# Patient Record
Sex: FEMALE | Race: WHITE | NOT HISPANIC OR LATINO | Employment: UNEMPLOYED | URBAN - METROPOLITAN AREA
[De-identification: names, ages, dates, MRNs, and addresses within clinical notes are randomized per-mention and may not be internally consistent; named-entity substitution may affect disease eponyms.]

---

## 2018-02-05 ENCOUNTER — CLINICAL SUPPORT (OUTPATIENT)
Dept: URGENT CARE | Facility: CLINIC | Age: 48
End: 2018-02-05
Payer: MEDICAID

## 2018-02-05 DIAGNOSIS — Z02.1 PRE-EMPLOYMENT DRUG SCREENING: Primary | ICD-10-CM

## 2018-02-05 LAB
CTP QC/QA: YES
POC 5 PANEL DRUG SCREEN: ABNORMAL

## 2018-02-05 PROCEDURE — 80305 DRUG TEST PRSMV DIR OPT OBS: CPT | Mod: QW,S$GLB,, | Performed by: PHYSICIAN ASSISTANT

## 2018-02-18 ENCOUNTER — HOSPITAL ENCOUNTER (EMERGENCY)
Facility: HOSPITAL | Age: 48
Discharge: HOME | End: 2018-02-18
Attending: EMERGENCY MEDICINE
Payer: MEDICAID

## 2018-02-18 VITALS
DIASTOLIC BLOOD PRESSURE: 68 MMHG | WEIGHT: 140 LBS | SYSTOLIC BLOOD PRESSURE: 101 MMHG | OXYGEN SATURATION: 99 % | TEMPERATURE: 98 F | RESPIRATION RATE: 18 BRPM | BODY MASS INDEX: 24.8 KG/M2 | HEART RATE: 103 BPM | HEIGHT: 63 IN

## 2018-02-18 DIAGNOSIS — L73.9 FOLLICULITIS: ICD-10-CM

## 2018-02-18 DIAGNOSIS — K04.7 DENTAL INFECTION: Primary | ICD-10-CM

## 2018-02-18 PROCEDURE — 99283 EMERGENCY DEPT VISIT LOW MDM: CPT

## 2018-02-18 PROCEDURE — 25000003 PHARM REV CODE 250: Performed by: PHYSICIAN ASSISTANT

## 2018-02-18 PROCEDURE — 99283 EMERGENCY DEPT VISIT LOW MDM: CPT | Mod: ,,, | Performed by: PHYSICIAN ASSISTANT

## 2018-02-18 RX ORDER — MUPIROCIN 20 MG/G
OINTMENT TOPICAL 3 TIMES DAILY
Qty: 30 G | Refills: 0 | Status: SHIPPED | OUTPATIENT
Start: 2018-02-18 | End: 2018-02-28

## 2018-02-18 RX ORDER — GLIMEPIRIDE 4 MG/1
4 TABLET ORAL
Qty: 30 TABLET | Refills: 0 | Status: ON HOLD | OUTPATIENT
Start: 2018-02-18 | End: 2018-09-03

## 2018-02-18 RX ORDER — MUPIROCIN 20 MG/G
1 OINTMENT TOPICAL
Status: COMPLETED | OUTPATIENT
Start: 2018-02-18 | End: 2018-02-18

## 2018-02-18 RX ORDER — AMOXICILLIN AND CLAVULANATE POTASSIUM 875; 125 MG/1; MG/1
1 TABLET, FILM COATED ORAL
Status: COMPLETED | OUTPATIENT
Start: 2018-02-18 | End: 2018-02-18

## 2018-02-18 RX ORDER — AMOXICILLIN AND CLAVULANATE POTASSIUM 875; 125 MG/1; MG/1
1 TABLET, FILM COATED ORAL 2 TIMES DAILY
Qty: 20 TABLET | Refills: 0 | Status: SHIPPED | OUTPATIENT
Start: 2018-02-18 | End: 2018-02-28

## 2018-02-18 RX ADMIN — AMOXICILLIN AND CLAVULANATE POTASSIUM 1 TABLET: 875; 125 TABLET, FILM COATED ORAL at 02:02

## 2018-02-18 RX ADMIN — MUPIROCIN 22 G: 20 OINTMENT TOPICAL at 02:02

## 2018-02-18 NOTE — ED NOTES
Patient identifiers for Mariah Foley 47 y.o. female checked and correct.  Chief Complaint   Patient presents with    Facial Swelling     left facial swelling and redness. dental work in progress     Past Medical History:   Diagnosis Date    Diabetes mellitus      Allergies reported: Review of patient's allergies indicates:  No Known Allergies      LOC: Patient is awake, alert, and aware of environment with an appropriate affect. Patient is oriented x 3 and speaking appropriately.  APPEARANCE: Patient resting comfortably and in no acute distress. Patient is clean and well groomed, patient's clothing is properly fastened.  HEENT: multiple lesions to chin area, redness, area firm, swelling noted, multiple broken teeth and cavities noted.  SKIN: The skin is warm and dry. Patient has normal skin turgor and moist mucus membranes. Skin is intact; no bruising or breakdown noted.  MUSKULOSKELETAL: Patient is moving all extremities well.  RESPIRATORY: Airway is open and patent. Respirations are spontaneous and non-labored with normal effort and rate.

## 2018-02-18 NOTE — ED TRIAGE NOTES
Lower facial swelling and redness noted, multiple healing lesions. Pt reports having sores develop after shaving on Wed.

## 2018-02-19 NOTE — ED PROVIDER NOTES
Encounter Date: 2018       History     Chief Complaint   Patient presents with    Facial Swelling     left facial swelling and redness. dental work in progress     Patient is a 47-year-old female with history of diabetes who presents to the emergency department with left-sided facial swelling.  She states earlier in the week, she attempted to shave her chin hairs.  She states 2 days later she had multiple scabs with pus to her chin.  She states she tried to pop them.  She states yesterday she woke up with significant swelling and pain in her mouth.  She states she is unsure if this is from the infected hair follicles on her chin or from her teeth.  She states she's been having dental work over the last several months.  She states she cannot afford to have all of her teeth pulled, but she was told that she needs to have her teeth pulled.  She denies fevers or chills.  She denies inability to control secretions.  She states she can still open and close her mouth.  She states that she recently moved here from [place].  She states she is not except care with a primary care doctor.  She states she has not had a prescription for her glimepiride in several weeks.      The history is provided by the patient.     Review of patient's allergies indicates:   Allergen Reactions    Bactrim [sulfamethoxazole-trimethoprim]      Past Medical History:   Diagnosis Date    Diabetes mellitus      Past Surgical History:   Procedure Laterality Date     SECTION, CLASSIC      TUBAL LIGATION       History reviewed. No pertinent family history.  Social History   Substance Use Topics    Smoking status: Current Every Day Smoker     Packs/day: 0.50     Years: 7.00     Types: Cigarettes    Smokeless tobacco: Not on file    Alcohol use No     Review of Systems   Constitutional: Negative for activity change, appetite change, chills, fatigue and fever.   HENT: Positive for dental problem and facial swelling. Negative for  congestion, ear discharge, ear pain, postnasal drip, rhinorrhea, sore throat and trouble swallowing.    Respiratory: Negative for cough and shortness of breath.    Cardiovascular: Negative for chest pain.   Gastrointestinal: Negative for abdominal pain, blood in stool, constipation, diarrhea, nausea and vomiting.   Genitourinary: Negative for dysuria, flank pain and hematuria.   Musculoskeletal: Negative for back pain, neck pain and neck stiffness.   Skin: Positive for rash and wound.   Neurological: Negative for dizziness, speech difficulty, weakness, light-headedness, numbness and headaches.       Physical Exam     Initial Vitals [02/18/18 1325]   BP Pulse Resp Temp SpO2   101/68 103 18 98 °F (36.7 °C) 99 %      MAP       79         Physical Exam    Nursing note and vitals reviewed.  Constitutional: She appears well-developed and well-nourished. She is not diaphoretic.  Non-toxic appearance. No distress.   HENT:   Head: Normocephalic.   Right Ear: Hearing and external ear normal.   Left Ear: Hearing and external ear normal.   Nose: Nose normal.   Mouth/Throat: Uvula is midline, oropharynx is clear and moist and mucous membranes are normal. No trismus in the jaw. Abnormal dentition (extensive dental decay with multiple cracked and broken teeth; all teeth are black; gingival erythema and edema most prominent at the mandibular central incisor.). Dental caries present. No uvula swelling. No oropharyngeal exudate.   Eyes: Conjunctivae are normal. Pupils are equal, round, and reactive to light.   Neck: Normal range of motion and full passive range of motion without pain. Neck supple. Normal range of motion present. No neck rigidity.   Cardiovascular: Normal rate and regular rhythm.   Pulmonary/Chest: Breath sounds normal.   Abdominal: Soft. Bowel sounds are normal. There is no tenderness.   Lymphadenopathy:     She has cervical adenopathy.   Neurological: She is alert and oriented to person, place, and time.   Skin:  Skin is warm and dry. Capillary refill takes less than 2 seconds.   Multiple scabs to chin with surrounding erythema   Psychiatric: She has a normal mood and affect.         ED Course   Procedures  Labs Reviewed - No data to display          Medical Decision Making:   Initial Assessment:   Urgent evaluation of a 47-year-old female with history of diabetes who presents to the emergency department with facial swelling.  Patient is afebrile, nontoxic appearing, and hemodynamically stable.  On exam, patient has folliculitis to chin.  She is extensive dental decay with obvious dental infection at the central incisor.  No drainable abscess.  I suspect that abscesses periapical.  No evidence of Ricardo angina.  Patient will be given Bactroban for the folliculitis.  She is given Augmentin for the dental infection.  She will be given a prescription for glimepiride.  She is advised to establish care with dentist and PCP.  She is advised to return to the emergency department with any worsening symptoms or concerns.  Case is discussed with supervising physician who agrees with plan.  Other:   I have discussed this case with another health care provider.       <> Summary of the Discussion: Dr. Valerio                      Clinical Impression:   The primary encounter diagnosis was Dental infection. Diagnoses of Folliculitis and Uncontrolled other specified diabetes mellitus without complication, unspecified long term insulin use status were also pertinent to this visit.                           Sariah Strong-MARYANN Purvis  02/18/18 5875

## 2018-03-16 ENCOUNTER — HOSPITAL ENCOUNTER (EMERGENCY)
Facility: HOSPITAL | Age: 48
Discharge: HOME | End: 2018-03-16
Attending: FAMILY MEDICINE
Payer: MEDICAID

## 2018-03-16 VITALS
WEIGHT: 130 LBS | TEMPERATURE: 98 F | SYSTOLIC BLOOD PRESSURE: 145 MMHG | HEIGHT: 63 IN | DIASTOLIC BLOOD PRESSURE: 77 MMHG | HEART RATE: 108 BPM | RESPIRATION RATE: 18 BRPM | OXYGEN SATURATION: 100 % | BODY MASS INDEX: 23.04 KG/M2

## 2018-03-16 DIAGNOSIS — J11.1 INFLUENZA: Primary | ICD-10-CM

## 2018-03-16 DIAGNOSIS — K04.7 DENTAL INFECTION: ICD-10-CM

## 2018-03-16 PROCEDURE — 63600175 PHARM REV CODE 636 W HCPCS: Performed by: FAMILY MEDICINE

## 2018-03-16 PROCEDURE — 99283 EMERGENCY DEPT VISIT LOW MDM: CPT | Mod: 25

## 2018-03-16 PROCEDURE — 99284 EMERGENCY DEPT VISIT MOD MDM: CPT | Mod: ,,, | Performed by: FAMILY MEDICINE

## 2018-03-16 PROCEDURE — 96372 THER/PROPH/DIAG INJ SC/IM: CPT

## 2018-03-16 RX ORDER — DEXAMETHASONE SODIUM PHOSPHATE 4 MG/ML
8 INJECTION, SOLUTION INTRA-ARTICULAR; INTRALESIONAL; INTRAMUSCULAR; INTRAVENOUS; SOFT TISSUE
Status: COMPLETED | OUTPATIENT
Start: 2018-03-16 | End: 2018-03-16

## 2018-03-16 RX ORDER — OSELTAMIVIR PHOSPHATE 75 MG/1
75 CAPSULE ORAL 2 TIMES DAILY
Qty: 10 CAPSULE | Refills: 0 | Status: SHIPPED | OUTPATIENT
Start: 2018-03-16 | End: 2018-03-21

## 2018-03-16 RX ORDER — PENICILLIN V POTASSIUM 500 MG/1
500 TABLET, FILM COATED ORAL 4 TIMES DAILY
Qty: 40 TABLET | Refills: 0 | Status: SHIPPED | OUTPATIENT
Start: 2018-03-16 | End: 2018-03-26

## 2018-03-16 RX ADMIN — DEXAMETHASONE SODIUM PHOSPHATE 8 MG: 4 INJECTION, SOLUTION INTRAMUSCULAR; INTRAVENOUS at 03:03

## 2018-03-16 NOTE — ED PROVIDER NOTES
Encounter Date: 3/16/2018    SCRIBE #1 NOTE: I, Heri Suarez, am scribing for, and in the presence of,  Dr. Liriano . I have scribed the following portions of the note - Other sections scribed: HPI, ROS,& PE.       History     Chief Complaint   Patient presents with    URI     hot cold, head pressure,      Time patient was seen by the provider: 3:29 PM    The patient is a 47 y.o. female with co-morbidities including: diabetes mellitus who presents to the ED with a complaint of fluid in her right ear. Pt also c/o chills, nausea, postnasal drip, and being around sick contacts. She denies a productive cough, vomiting, diarrhea, and fever. She also endorses dental pain. Of note: Pt did not get her flu shot this year.       The history is provided by the patient.     Review of patient's allergies indicates:   Allergen Reactions    Bactrim [sulfamethoxazole-trimethoprim]      Past Medical History:   Diagnosis Date    Diabetes mellitus      Past Surgical History:   Procedure Laterality Date     SECTION, CLASSIC      TUBAL LIGATION       No family history on file.  Social History   Substance Use Topics    Smoking status: Current Every Day Smoker     Packs/day: 0.50     Years: 7.00     Types: Cigarettes    Smokeless tobacco: Never Used    Alcohol use No     Review of Systems   Constitutional: Positive for chills. Negative for fever.   HENT: Positive for dental problem and postnasal drip. Negative for sore throat.         Right ear fluid.    Respiratory: Negative for cough and shortness of breath.    Cardiovascular: Negative for chest pain.   Gastrointestinal: Positive for nausea. Negative for diarrhea and vomiting.   Genitourinary: Negative for dysuria.   Musculoskeletal: Negative for back pain.   Skin: Negative for rash.   Neurological: Negative for weakness.   Hematological: Does not bruise/bleed easily.       Physical Exam     Initial Vitals [18 1426]   BP Pulse Resp Temp SpO2   (!) 145/77 108 18  97.9 °F (36.6 °C) 100 %      MAP       99.67         Physical Exam    Nursing note and vitals reviewed.  Constitutional: She appears well-developed and well-nourished. No distress.   HENT:   Head: Normocephalic and atraumatic.   Right TM bulging with clear fluid, but no infection. Throat-postnasal drip with no exudate. Pt's lower incisors are mildly erythematous. Below her teeth they are tender consistent with evolving dental infection.    Neck: Neck supple.   Swollen right anterior cervical nodes.    Musculoskeletal: Normal range of motion. She exhibits no edema.   Neurological: She is alert and oriented to person, place, and time.   Skin: Skin is warm and dry.         ED Course   Procedures  Labs Reviewed - No data to display          Medical Decision Making:   History:   Old Medical Records: I decided to obtain old medical records.  ED Management:  Patient's respiratory an infection symptoms are consistent with influenza.  We will treat with the flu ×5 days.  She also has poor dentition and symptoms consistent with a dental abscess.  We will also start Pen-Vee K 4 times a day ×10 days.  Patient is stable for discharge recommendations for her PCP follow-up, given            Scribe Attestation:   Scribe #1: I performed the above scribed service and the documentation accurately describes the services I performed. I attest to the accuracy of the note.               Clinical Impression:   The primary encounter diagnosis was Influenza. A diagnosis of Dental infection was also pertinent to this visit.                           Fausto Liriano MD  03/16/18 9560

## 2018-03-16 NOTE — ED NOTES
"Comes to the ED with c/o "my hair hurts"  And flu symptoms.  Also dental abscess.  LOC: The patient is awake, alert, and oriented to place, time, situation. Affect is appropriate.  Speech is appropriate and clear.     APPEARANCE: Patient resting comfortably in no acute distress.  Patient is clean and well groomed.    SKIN: The skin is warm and dry; color consistent with ethnicity.  Patient has normal skin turgor and moist mucus membranes.  Skin intact; no breakdown or bruising noted.     MUSCULOSKELETAL: Patient moving upper and lower extremities without difficulty.  Denies weakness.     RESPIRATORY: Airway is open and patent. Respirations spontaneous, even, easy, and non-labored.  Patient has a normal effort and rate.  No accessory muscle use noted. Denies cough. Sinus congestions and tinges in right ear.  CARDIAC:   No peripheral edema noted. No complaints of chest pain.      ABDOMEN: Soft and non tender to palpation.  No distention noted.     NEUROLOGIC: Eyes open spontaneously.  Behavior appropriate to situation.  Follows commands; facial expression symmetrical.  Purposeful motor response noted; normal sensation in all extremities.        "

## 2018-04-26 ENCOUNTER — HOSPITAL ENCOUNTER (EMERGENCY)
Facility: HOSPITAL | Age: 48
Discharge: HOME | End: 2018-04-26
Attending: FAMILY MEDICINE
Payer: MEDICAID

## 2018-04-26 VITALS
RESPIRATION RATE: 18 BRPM | OXYGEN SATURATION: 99 % | TEMPERATURE: 98 F | DIASTOLIC BLOOD PRESSURE: 63 MMHG | BODY MASS INDEX: 23.03 KG/M2 | WEIGHT: 130 LBS | SYSTOLIC BLOOD PRESSURE: 99 MMHG | HEART RATE: 102 BPM

## 2018-04-26 DIAGNOSIS — L02.91 ABSCESS: Primary | ICD-10-CM

## 2018-04-26 DIAGNOSIS — L03.90 CELLULITIS, UNSPECIFIED CELLULITIS SITE: ICD-10-CM

## 2018-04-26 LAB
B-HCG UR QL: NEGATIVE
BACTERIA #/AREA URNS AUTO: ABNORMAL /HPF
BILIRUB UR QL STRIP: NEGATIVE
CLARITY UR REFRACT.AUTO: ABNORMAL
COLOR UR AUTO: YELLOW
CTP QC/QA: YES
GLUCOSE UR QL STRIP: ABNORMAL
HGB UR QL STRIP: ABNORMAL
KETONES UR QL STRIP: NEGATIVE
LEUKOCYTE ESTERASE UR QL STRIP: ABNORMAL
MICROSCOPIC COMMENT: ABNORMAL
NITRITE UR QL STRIP: POSITIVE
PH UR STRIP: 5 [PH] (ref 5–8)
PROT UR QL STRIP: NEGATIVE
RBC #/AREA URNS AUTO: 6 /HPF (ref 0–4)
SP GR UR STRIP: 1.02 (ref 1–1.03)
SQUAMOUS #/AREA URNS AUTO: 27 /HPF
URN SPEC COLLECT METH UR: ABNORMAL
UROBILINOGEN UR STRIP-ACNC: NEGATIVE EU/DL
WBC #/AREA URNS AUTO: 44 /HPF (ref 0–5)
WBC CLUMPS UR QL AUTO: ABNORMAL
YEAST UR QL AUTO: ABNORMAL

## 2018-04-26 PROCEDURE — 87086 URINE CULTURE/COLONY COUNT: CPT

## 2018-04-26 PROCEDURE — 87077 CULTURE AEROBIC IDENTIFY: CPT

## 2018-04-26 PROCEDURE — 99284 EMERGENCY DEPT VISIT MOD MDM: CPT | Mod: 25

## 2018-04-26 PROCEDURE — 25000003 PHARM REV CODE 250: Performed by: FAMILY MEDICINE

## 2018-04-26 PROCEDURE — 81025 URINE PREGNANCY TEST: CPT | Performed by: FAMILY MEDICINE

## 2018-04-26 PROCEDURE — 87088 URINE BACTERIA CULTURE: CPT

## 2018-04-26 PROCEDURE — 10060 I&D ABSCESS SIMPLE/SINGLE: CPT | Mod: 51,,, | Performed by: NURSE PRACTITIONER

## 2018-04-26 PROCEDURE — 10061 I&D ABSCESS COMP/MULTIPLE: CPT

## 2018-04-26 PROCEDURE — 56405 I&D VULVA/PERINEAL ABSCESS: CPT

## 2018-04-26 PROCEDURE — 81001 URINALYSIS AUTO W/SCOPE: CPT

## 2018-04-26 PROCEDURE — 46050 I&D PERIANAL ABSCESS SUPFC: CPT | Mod: 59,,, | Performed by: NURSE PRACTITIONER

## 2018-04-26 PROCEDURE — 87186 SC STD MICRODIL/AGAR DIL: CPT

## 2018-04-26 PROCEDURE — 10060 I&D ABSCESS SIMPLE/SINGLE: CPT

## 2018-04-26 PROCEDURE — 99284 EMERGENCY DEPT VISIT MOD MDM: CPT | Mod: 25,,, | Performed by: NURSE PRACTITIONER

## 2018-04-26 RX ORDER — CEPHALEXIN 500 MG/1
500 CAPSULE ORAL 4 TIMES DAILY
Qty: 20 CAPSULE | Refills: 0 | Status: SHIPPED | OUTPATIENT
Start: 2018-04-26 | End: 2018-05-01

## 2018-04-26 RX ORDER — DOXYCYCLINE 100 MG/1
100 CAPSULE ORAL 2 TIMES DAILY
Qty: 20 CAPSULE | Refills: 0 | Status: SHIPPED | OUTPATIENT
Start: 2018-04-26 | End: 2018-05-06

## 2018-04-26 RX ORDER — OXYCODONE AND ACETAMINOPHEN 5; 325 MG/1; MG/1
2 TABLET ORAL
Status: COMPLETED | OUTPATIENT
Start: 2018-04-26 | End: 2018-04-26

## 2018-04-26 RX ORDER — LIDOCAINE HYDROCHLORIDE 10 MG/ML
20 INJECTION INFILTRATION; PERINEURAL ONCE
Status: COMPLETED | OUTPATIENT
Start: 2018-04-26 | End: 2018-04-26

## 2018-04-26 RX ORDER — OXYCODONE AND ACETAMINOPHEN 5; 325 MG/1; MG/1
1 TABLET ORAL EVERY 4 HOURS PRN
Qty: 18 TABLET | Refills: 0 | Status: SHIPPED | OUTPATIENT
Start: 2018-04-26 | End: 2018-09-02

## 2018-04-26 RX ADMIN — LIDOCAINE HYDROCHLORIDE 20 ML: 10 INJECTION, SOLUTION INFILTRATION; PERINEURAL at 01:04

## 2018-04-26 RX ADMIN — OXYCODONE HYDROCHLORIDE AND ACETAMINOPHEN 2 TABLET: 5; 325 TABLET ORAL at 02:04

## 2018-04-26 NOTE — ED PROVIDER NOTES
"Encounter Date: 2018       History     Chief Complaint   Patient presents with    Abscess     under left arm pit and to vagina.      Patient is a 47-year-old female with medical history of diabetes mellitus and chronic skin infections presenting to the ED for abscess to the left armpit and labia.  Patient states that she used a new disposable razor to shave with 4 days ago and the next day started with these abscesses. Pt states that "boil" in right armpit and in groin area is painful.  Pt states she is currently on Clindamycin for dental abscesses.  Patient denies fever, chills, nausea, vomiting, diarrhea, abdominal pain or shortness of breath.            Review of patient's allergies indicates:   Allergen Reactions    Bactrim [sulfamethoxazole-trimethoprim]      Past Medical History:   Diagnosis Date    Diabetes mellitus      Past Surgical History:   Procedure Laterality Date     SECTION, CLASSIC      TUBAL LIGATION       History reviewed. No pertinent family history.  Social History   Substance Use Topics    Smoking status: Current Every Day Smoker     Packs/day: 0.50     Years: 7.00     Types: Cigarettes    Smokeless tobacco: Never Used    Alcohol use No     Review of Systems   Constitutional: Negative for activity change, appetite change, chills, fatigue and fever.   HENT: Negative for congestion, sinus pain, sinus pressure, sore throat and trouble swallowing.    Eyes: Negative for photophobia, pain and discharge.   Respiratory: Negative for apnea, cough, choking, chest tightness, shortness of breath, wheezing and stridor.    Cardiovascular: Negative for chest pain, palpitations and leg swelling.   Gastrointestinal: Negative for abdominal distention, abdominal pain, constipation, diarrhea, nausea and vomiting.   Endocrine: Negative.    Genitourinary: Negative for difficulty urinating, dysuria, frequency and urgency.   Musculoskeletal: Negative for arthralgias, back pain, gait problem, joint " swelling, myalgias, neck pain and neck stiffness.   Skin: Positive for wound. Negative for pallor and rash.   Allergic/Immunologic: Negative.    Neurological: Negative for dizziness, tremors, syncope, weakness, numbness and headaches.   Hematological: Negative for adenopathy.   Psychiatric/Behavioral: Negative.        Physical Exam     Initial Vitals [04/26/18 1238]   BP Pulse Resp Temp SpO2   99/63 100 18 98.2 °F (36.8 °C) 100 %      MAP       75         Physical Exam    Nursing note and vitals reviewed.  Constitutional: Vital signs are normal. She appears well-developed and well-nourished. She is cooperative. She does not have a sickly appearance. She does not appear ill. No distress.   HENT:   Head: Normocephalic and atraumatic.   Mouth/Throat: Uvula is midline. Mucous membranes are pale. Abnormal dentition. Dental caries present. Oropharyngeal exudate present.   Eyes: Conjunctivae, EOM and lids are normal. Pupils are equal, round, and reactive to light.   Neck: Trachea normal, normal range of motion, full passive range of motion without pain and phonation normal. Neck supple. No JVD present.   Cardiovascular: Normal rate, regular rhythm, normal heart sounds and intact distal pulses.   Pulses:       Radial pulses are 2+ on the right side, and 2+ on the left side.        Dorsalis pedis pulses are 2+ on the right side, and 2+ on the left side.   Pulmonary/Chest: Effort normal and breath sounds normal.   Abdominal: Soft. Normal appearance and bowel sounds are normal. She exhibits no distension. There is no tenderness. There is no rigidity, no rebound and no guarding.   Genitourinary: Rectum normal and vagina normal.       Pelvic exam was performed with patient supine. There is tenderness on the right labia. There is no rash, lesion or injury on the right labia.   Musculoskeletal: Normal range of motion.   Neurological: She is alert and oriented to person, place, and time. She has normal strength.   Skin: Skin is  warm, dry and intact. Capillary refill takes less than 2 seconds. Abscess noted. No abrasion and no rash noted. No cyanosis. Nails show no clubbing.   2 abscesses noted to right armpit.    Upper abscess approximatly 0.5cm.   Lower abscess approximatly 2.4cm   Pubic area abscess approximatly 2.5cm      Psychiatric: Her behavior is normal. Judgment and thought content normal. Her mood appears anxious. Cognition and memory are normal. She exhibits a depressed mood.         ED Course   I & D - Incision and Drainage  Date/Time: 4/26/2018 3:44 PM  Location procedure was performed: [facility] EMERGENCY DEPARTMENT  Performed by: NASRA POOLE  Authorized by: KRIS SOLIS   Pre-operative diagnosis: abscess  Post-operative diagnosis: abscess  Consent Done: Emergent Situation  Type: abscess  Body area: anogenital  Location details: vulva  Anesthesia: local infiltration    Anesthesia:  Local Anesthetic: lidocaine 1% without epinephrine  Anesthetic total: 5 mL  Patient sedated: no  Scalpel size: 11  Incision type: single straight  Complexity: simple  Drainage: pus,  serosanguinous,  bloody and  purulent  Drainage amount: moderate  Wound treatment: incision,  wound left open,  drainage and  deloculation  Complications: No  Specimens: No  Implants: No  Patient tolerance: Patient tolerated the procedure well with no immediate complications    I & D - Incision and Drainage  Date/Time: 4/26/2018 3:46 PM  Location procedure was performed: [facility] EMERGENCY DEPARTMENT  Performed by: NASRA POOLE  Authorized by: KRIS SOLIS   Pre-operative diagnosis: abscess  Post-operative diagnosis: abscess  Consent Done: Emergent Situation  Type: abscess  Body area: upper extremity  Location details: left arm  Anesthesia: local infiltration    Anesthesia:  Local Anesthetic: lidocaine 1% without epinephrine  Patient sedated: no  Scalpel size: 11  Incision type: single straight  Complexity: simple  Drainage: pus,  serosanguinous,  bloody and   purulent  Drainage amount: scant  Wound treatment: incision,  wound left open,  drainage and  deloculation  Complications: No  Specimens: No  Implants: No  Patient tolerance: Patient tolerated the procedure well with no immediate complications    I & D - Incision and Drainage  Date/Time: 4/26/2018 3:47 PM  Location procedure was performed: [facility] EMERGENCY DEPARTMENT  Performed by: NASRA POOLE  Authorized by: KRIS SOLIS   Pre-operative diagnosis: abscess  Post-operative diagnosis: abscess  Consent Done: Emergent Situation  Type: abscess  Body area: anogenital (pelvic area)  Anesthesia: local infiltration    Anesthesia:  Local Anesthetic: lidocaine 1% without epinephrine  Patient sedated: no  Scalpel size: 11  Incision type: single straight  Complexity: simple  Drainage: pus,  serosanguinous,  bloody and  purulent  Drainage amount: scant  Wound treatment: incision,  drainage,  deloculation and  wound left open  Complications: No  Specimens: No  Implants: No  Patient tolerance: Patient tolerated the procedure well with no immediate complications        Labs Reviewed   URINALYSIS, REFLEX TO URINE CULTURE - Abnormal; Notable for the following:        Result Value    Appearance, UA Cloudy (*)     Glucose, UA 3+ (*)     Occult Blood UA 1+ (*)     Nitrite, UA Positive (*)     Leukocytes, UA 2+ (*)     All other components within normal limits    Narrative:     Preferred Collection Type->Urine, Clean Catch  YELLOW ONLY RECEIVED    URINALYSIS MICROSCOPIC - Abnormal; Notable for the following:     RBC, UA 6 (*)     WBC, UA 44 (*)     WBC Clumps, UA Few (*)     Bacteria, UA Many (*)     All other components within normal limits    Narrative:     Preferred Collection Type->Urine, Clean Catch  YELLOW ONLY RECEIVED    CULTURE, URINE   COMPREHENSIVE METABOLIC PANEL   CBC W/ AUTO DIFFERENTIAL   POCT URINE PREGNANCY                   APC / Resident Notes:   Emergent evaluation of a 46 yo female patient presenting to the ER  with chief complaint of scattered abscesses.  Pt states that 2 abscesses under her left armpit, 1 abscess on her right pelvic area, and 1 abscess on her labia.  Pt states that she used a disposable razor that she has never used before 2 days before the abscesses started.  On exam, pt multiple abscesses noted.  Breath sounds clear. Bowel sounds WNL.  I will medicate, I&D and reassess.  Differential diagnoses include but are not limited to abscess, sebaceous cyst, dermoid cyst, furuncle or others. I discussed the care of this patient with my Supervising Physician.      It is my impression based on the historical events and the physical exam that this is an abscess.  It was drained in the ER.  There is surrounding cellulitis.  I do not believe the patient has underlying necrotizing fasciitis.  Patient has risk factors for MRSA. Based on these factors we will treat with antibiotics. Pt is stable for discharge. Discharge instructions given. Prescription for Percocet, Keflex and Doxycycline given and explained. Return to ED precautions discussed. Follow up as directed. Pt verbalized understanding of this plan.                    Clinical Impression:   The primary encounter diagnosis was Abscess. A diagnosis of Cellulitis, unspecified cellulitis site was also pertinent to this visit.    Disposition:   Disposition: Discharged  Condition: Stable                        Cheri Polk NP  04/26/18 0861

## 2018-04-26 NOTE — ED TRIAGE NOTES
Pt c/o having a boil on her vaginal area. States feels it is on her right labia. Denies any drianage but c/o increasing pain. Pt  Also states has a boil under left armpit. Denies any drainage but c/o pain, redness, and states boil has come to a head.  Pt c/o first digit on left foot becoming infected about 4-5 days ago. States toe nail fell off about 4 days ago. Pt states was on antibiotics for another type of infection.

## 2018-04-26 NOTE — ED NOTES
LOC: The patient is awake, alert, and oriented to place, time, situation. Affect is appropriate.  Speech is appropriate and clear.     APPEARANCE: Patient resting comfortably in no acute distress.  Patient is clean and well groomed.    SKIN: Pt c/o having a boil on her vaginal area. States feels it is on her right labia. Denies any drianage but c/o increasing pain. Pt  Also states has a boil under left armpit. Denies any drainage; Area painful, red, and boil has come to a head. Pt c/o first digit on left foot becoming infected about 4-5 days ago. States toe nail fell off about 4 days ago. Pt states was on antibiotics for another type of infection. Pt toe nail area moist and yellow. No drainage or bleeding at this time.     MUSCULOSKELETAL: Patient moving upper and lower extremities without difficulty.  Denies weakness.     RESPIRATORY: Airway is open and patent. Respirations spontaneous, even, easy, and non-labored.  Patient has a normal effort and rate.  No accessory muscle use noted. Denies cough.     CARDIAC:  Normal rhythm and rate noted.  No peripheral edema noted. No complaints of chest pain.      ABDOMEN: Soft and non tender to palpation.  No distention noted.     NEUROLOGIC: Eyes open spontaneously.  Behavior appropriate to situation.  Follows commands; facial expression symmetrical.  Purposeful motor response noted; normal sensation in all extremities.

## 2018-04-28 LAB — BACTERIA UR CULT: NORMAL

## 2018-09-02 ENCOUNTER — HOSPITAL ENCOUNTER (INPATIENT)
Facility: HOSPITAL | Age: 48
LOS: 11 days | Discharge: HOME HEALTH | DRG: 853 | End: 2018-09-13
Attending: EMERGENCY MEDICINE | Admitting: INTERNAL MEDICINE
Payer: MEDICAID

## 2018-09-02 DIAGNOSIS — D72.829 LEUKOCYTOSIS, UNSPECIFIED TYPE: ICD-10-CM

## 2018-09-02 DIAGNOSIS — E83.42 HYPOMAGNESEMIA: ICD-10-CM

## 2018-09-02 DIAGNOSIS — E11.621 DIABETIC ULCER OF TOE OF RIGHT FOOT ASSOCIATED WITH TYPE 2 DIABETES MELLITUS, WITH FAT LAYER EXPOSED: ICD-10-CM

## 2018-09-02 DIAGNOSIS — R05.9 COUGH: ICD-10-CM

## 2018-09-02 DIAGNOSIS — Z79.4 TYPE 2 DIABETES MELLITUS WITH DIABETIC POLYNEUROPATHY, WITH LONG-TERM CURRENT USE OF INSULIN: ICD-10-CM

## 2018-09-02 DIAGNOSIS — R78.81 BACTEREMIA: ICD-10-CM

## 2018-09-02 DIAGNOSIS — N12 PYELONEPHRITIS: Primary | ICD-10-CM

## 2018-09-02 DIAGNOSIS — A41.9 SEPSIS: ICD-10-CM

## 2018-09-02 DIAGNOSIS — E13.621 FOOT ULCER DUE TO SECONDARY DM: ICD-10-CM

## 2018-09-02 DIAGNOSIS — L97.509 FOOT ULCER DUE TO SECONDARY DM: ICD-10-CM

## 2018-09-02 DIAGNOSIS — R73.9 HYPERGLYCEMIA: ICD-10-CM

## 2018-09-02 DIAGNOSIS — E11.42 TYPE 2 DIABETES MELLITUS WITH DIABETIC POLYNEUROPATHY, WITH LONG-TERM CURRENT USE OF INSULIN: ICD-10-CM

## 2018-09-02 DIAGNOSIS — L97.512 DIABETIC ULCER OF TOE OF RIGHT FOOT ASSOCIATED WITH TYPE 2 DIABETES MELLITUS, WITH FAT LAYER EXPOSED: ICD-10-CM

## 2018-09-02 DIAGNOSIS — M86.471 CHRONIC OSTEOMYELITIS OF RIGHT FOOT WITH DRAINING SINUS: ICD-10-CM

## 2018-09-02 PROBLEM — L97.519 DIABETIC ULCER OF TOE OF RIGHT FOOT: Status: ACTIVE | Noted: 2018-09-02

## 2018-09-02 PROBLEM — N39.0 UTI (URINARY TRACT INFECTION): Status: ACTIVE | Noted: 2018-09-02

## 2018-09-02 PROBLEM — Z72.0 TOBACCO ABUSE: Status: ACTIVE | Noted: 2018-09-02

## 2018-09-02 PROBLEM — E11.9 DM2 (DIABETES MELLITUS, TYPE 2): Status: ACTIVE | Noted: 2018-09-02

## 2018-09-02 PROBLEM — M79.7 FIBROMYALGIA: Status: ACTIVE | Noted: 2018-09-02

## 2018-09-02 LAB
ALBUMIN SERPL BCP-MCNC: 3.4 G/DL
ALP SERPL-CCNC: 145 U/L
ALT SERPL W/O P-5'-P-CCNC: 14 U/L
ANION GAP SERPL CALC-SCNC: 14 MMOL/L
AST SERPL-CCNC: 14 U/L
BACTERIA #/AREA URNS AUTO: ABNORMAL /HPF
BASOPHILS # BLD AUTO: 0.05 K/UL
BASOPHILS NFR BLD: 0.3 %
BILIRUB SERPL-MCNC: 0.6 MG/DL
BILIRUB UR QL STRIP: NEGATIVE
BUN SERPL-MCNC: 16 MG/DL
CALCIUM SERPL-MCNC: 9.6 MG/DL
CHLORIDE SERPL-SCNC: 94 MMOL/L
CLARITY UR REFRACT.AUTO: ABNORMAL
CO2 SERPL-SCNC: 23 MMOL/L
COLOR UR AUTO: YELLOW
CREAT SERPL-MCNC: 0.9 MG/DL
DIFFERENTIAL METHOD: ABNORMAL
EOSINOPHIL # BLD AUTO: 0 K/UL
EOSINOPHIL NFR BLD: 0.1 %
ERYTHROCYTE [DISTWIDTH] IN BLOOD BY AUTOMATED COUNT: 12.2 %
EST. GFR  (AFRICAN AMERICAN): >60 ML/MIN/1.73 M^2
EST. GFR  (NON AFRICAN AMERICAN): >60 ML/MIN/1.73 M^2
GLUCOSE SERPL-MCNC: 383 MG/DL
GLUCOSE UR QL STRIP: ABNORMAL
GRAM STN SPEC: NORMAL
HCT VFR BLD AUTO: 40.6 %
HETEROPH AB SERPL QL IA: NEGATIVE
HGB BLD-MCNC: 13.8 G/DL
HGB UR QL STRIP: ABNORMAL
IMM GRANULOCYTES # BLD AUTO: 0.1 K/UL
IMM GRANULOCYTES NFR BLD AUTO: 0.6 %
KETONES UR QL STRIP: ABNORMAL
LACTATE SERPL-SCNC: 1.6 MMOL/L
LEUKOCYTE ESTERASE UR QL STRIP: ABNORMAL
LYMPHOCYTES # BLD AUTO: 0.5 K/UL
LYMPHOCYTES NFR BLD: 2.9 %
MAGNESIUM SERPL-MCNC: 1.5 MG/DL
MCH RBC QN AUTO: 27.3 PG
MCHC RBC AUTO-ENTMCNC: 34 G/DL
MCV RBC AUTO: 80 FL
MICROSCOPIC COMMENT: ABNORMAL
MONOCYTES # BLD AUTO: 1.2 K/UL
MONOCYTES NFR BLD: 6.3 %
NEUTROPHILS # BLD AUTO: 16.3 K/UL
NEUTROPHILS NFR BLD: 89.8 %
NITRITE UR QL STRIP: NEGATIVE
NRBC BLD-RTO: 0 /100 WBC
PH UR STRIP: 5 [PH] (ref 5–8)
PLATELET # BLD AUTO: 180 K/UL
PMV BLD AUTO: 11.8 FL
POCT GLUCOSE: 286 MG/DL (ref 70–110)
POTASSIUM SERPL-SCNC: 3.6 MMOL/L
PROCALCITONIN SERPL IA-MCNC: 11.01 NG/ML
PROT SERPL-MCNC: 7.8 G/DL
PROT UR QL STRIP: NEGATIVE
RBC # BLD AUTO: 5.06 M/UL
RBC #/AREA URNS AUTO: 2 /HPF (ref 0–4)
SODIUM SERPL-SCNC: 131 MMOL/L
SP GR UR STRIP: 1.03 (ref 1–1.03)
SQUAMOUS #/AREA URNS AUTO: 4 /HPF
URN SPEC COLLECT METH UR: ABNORMAL
UROBILINOGEN UR STRIP-ACNC: NEGATIVE EU/DL
WBC # BLD AUTO: 18.15 K/UL
WBC #/AREA URNS AUTO: 45 /HPF (ref 0–5)
YEAST UR QL AUTO: ABNORMAL

## 2018-09-02 PROCEDURE — 83605 ASSAY OF LACTIC ACID: CPT

## 2018-09-02 PROCEDURE — 87205 SMEAR GRAM STAIN: CPT

## 2018-09-02 PROCEDURE — 87040 BLOOD CULTURE FOR BACTERIA: CPT

## 2018-09-02 PROCEDURE — 36415 COLL VENOUS BLD VENIPUNCTURE: CPT

## 2018-09-02 PROCEDURE — 80053 COMPREHEN METABOLIC PANEL: CPT

## 2018-09-02 PROCEDURE — 81001 URINALYSIS AUTO W/SCOPE: CPT

## 2018-09-02 PROCEDURE — 96361 HYDRATE IV INFUSION ADD-ON: CPT

## 2018-09-02 PROCEDURE — 96374 THER/PROPH/DIAG INJ IV PUSH: CPT

## 2018-09-02 PROCEDURE — 87077 CULTURE AEROBIC IDENTIFY: CPT | Mod: 59

## 2018-09-02 PROCEDURE — 87076 CULTURE ANAEROBE IDENT EACH: CPT | Mod: 59

## 2018-09-02 PROCEDURE — 11000001 HC ACUTE MED/SURG PRIVATE ROOM

## 2018-09-02 PROCEDURE — 99284 EMERGENCY DEPT VISIT MOD MDM: CPT | Mod: 25

## 2018-09-02 PROCEDURE — 84145 PROCALCITONIN (PCT): CPT

## 2018-09-02 PROCEDURE — 63600175 PHARM REV CODE 636 W HCPCS: Performed by: EMERGENCY MEDICINE

## 2018-09-02 PROCEDURE — 86703 HIV-1/HIV-2 1 RESULT ANTBDY: CPT

## 2018-09-02 PROCEDURE — 87880 STREP A ASSAY W/OPTIC: CPT

## 2018-09-02 PROCEDURE — 83735 ASSAY OF MAGNESIUM: CPT

## 2018-09-02 PROCEDURE — 96375 TX/PRO/DX INJ NEW DRUG ADDON: CPT

## 2018-09-02 PROCEDURE — 99223 1ST HOSP IP/OBS HIGH 75: CPT | Mod: ,,, | Performed by: STUDENT IN AN ORGANIZED HEALTH CARE EDUCATION/TRAINING PROGRAM

## 2018-09-02 PROCEDURE — 87186 SC STD MICRODIL/AGAR DIL: CPT

## 2018-09-02 PROCEDURE — 87088 URINE BACTERIA CULTURE: CPT

## 2018-09-02 PROCEDURE — 87400 INFLUENZA A/B EACH AG IA: CPT

## 2018-09-02 PROCEDURE — 87070 CULTURE OTHR SPECIMN AEROBIC: CPT

## 2018-09-02 PROCEDURE — 87081 CULTURE SCREEN ONLY: CPT

## 2018-09-02 PROCEDURE — 25000003 PHARM REV CODE 250: Performed by: EMERGENCY MEDICINE

## 2018-09-02 PROCEDURE — 85025 COMPLETE CBC W/AUTO DIFF WBC: CPT

## 2018-09-02 PROCEDURE — 87086 URINE CULTURE/COLONY COUNT: CPT

## 2018-09-02 PROCEDURE — 87147 CULTURE TYPE IMMUNOLOGIC: CPT

## 2018-09-02 PROCEDURE — 87075 CULTR BACTERIA EXCEPT BLOOD: CPT

## 2018-09-02 PROCEDURE — 86308 HETEROPHILE ANTIBODY SCREEN: CPT

## 2018-09-02 PROCEDURE — 99284 EMERGENCY DEPT VISIT MOD MDM: CPT | Mod: ,,, | Performed by: EMERGENCY MEDICINE

## 2018-09-02 PROCEDURE — 63600175 PHARM REV CODE 636 W HCPCS: Performed by: STUDENT IN AN ORGANIZED HEALTH CARE EDUCATION/TRAINING PROGRAM

## 2018-09-02 PROCEDURE — 25000003 PHARM REV CODE 250: Performed by: STUDENT IN AN ORGANIZED HEALTH CARE EDUCATION/TRAINING PROGRAM

## 2018-09-02 RX ORDER — GLUCAGON 1 MG
1 KIT INJECTION
Status: DISCONTINUED | OUTPATIENT
Start: 2018-09-02 | End: 2018-09-13 | Stop reason: HOSPADM

## 2018-09-02 RX ORDER — CEFTRIAXONE 1 G/1
1 INJECTION, POWDER, FOR SOLUTION INTRAMUSCULAR; INTRAVENOUS
Status: COMPLETED | OUTPATIENT
Start: 2018-09-02 | End: 2018-09-02

## 2018-09-02 RX ORDER — CEFTRIAXONE 1 G/1
1 INJECTION, POWDER, FOR SOLUTION INTRAMUSCULAR; INTRAVENOUS
Status: DISCONTINUED | OUTPATIENT
Start: 2018-09-02 | End: 2018-09-02

## 2018-09-02 RX ORDER — LANOLIN ALCOHOL/MO/W.PET/CERES
400 CREAM (GRAM) TOPICAL ONCE
Status: DISCONTINUED | OUTPATIENT
Start: 2018-09-02 | End: 2018-09-02

## 2018-09-02 RX ORDER — ACETAMINOPHEN 325 MG/1
650 TABLET ORAL EVERY 4 HOURS PRN
Status: DISCONTINUED | OUTPATIENT
Start: 2018-09-02 | End: 2018-09-13 | Stop reason: HOSPADM

## 2018-09-02 RX ORDER — LANOLIN ALCOHOL/MO/W.PET/CERES
400 CREAM (GRAM) TOPICAL
Status: COMPLETED | OUTPATIENT
Start: 2018-09-02 | End: 2018-09-02

## 2018-09-02 RX ORDER — SODIUM CHLORIDE 0.9 % (FLUSH) 0.9 %
3 SYRINGE (ML) INJECTION
Status: DISCONTINUED | OUTPATIENT
Start: 2018-09-02 | End: 2018-09-13 | Stop reason: HOSPADM

## 2018-09-02 RX ORDER — ONDANSETRON 2 MG/ML
4 INJECTION INTRAMUSCULAR; INTRAVENOUS
Status: COMPLETED | OUTPATIENT
Start: 2018-09-02 | End: 2018-09-02

## 2018-09-02 RX ORDER — POTASSIUM CHLORIDE 20 MEQ/15ML
40 SOLUTION ORAL ONCE
Status: COMPLETED | OUTPATIENT
Start: 2018-09-02 | End: 2018-09-02

## 2018-09-02 RX ORDER — VANCOMYCIN HCL IN 5 % DEXTROSE 1G/250ML
15 PLASTIC BAG, INJECTION (ML) INTRAVENOUS
Status: DISCONTINUED | OUTPATIENT
Start: 2018-09-02 | End: 2018-09-04

## 2018-09-02 RX ORDER — KETOROLAC TROMETHAMINE 30 MG/ML
15 INJECTION, SOLUTION INTRAMUSCULAR; INTRAVENOUS EVERY 6 HOURS PRN
Status: DISPENSED | OUTPATIENT
Start: 2018-09-02 | End: 2018-09-05

## 2018-09-02 RX ORDER — KETOROLAC TROMETHAMINE 30 MG/ML
15 INJECTION, SOLUTION INTRAMUSCULAR; INTRAVENOUS
Status: COMPLETED | OUTPATIENT
Start: 2018-09-02 | End: 2018-09-02

## 2018-09-02 RX ORDER — IBUPROFEN 200 MG
24 TABLET ORAL
Status: DISCONTINUED | OUTPATIENT
Start: 2018-09-02 | End: 2018-09-13 | Stop reason: HOSPADM

## 2018-09-02 RX ORDER — GABAPENTIN 300 MG/1
300 CAPSULE ORAL 3 TIMES DAILY
Status: DISCONTINUED | OUTPATIENT
Start: 2018-09-03 | End: 2018-09-13 | Stop reason: HOSPADM

## 2018-09-02 RX ORDER — IBUPROFEN 200 MG
16 TABLET ORAL
Status: DISCONTINUED | OUTPATIENT
Start: 2018-09-02 | End: 2018-09-13 | Stop reason: HOSPADM

## 2018-09-02 RX ORDER — INSULIN ASPART 100 [IU]/ML
0-5 INJECTION, SOLUTION INTRAVENOUS; SUBCUTANEOUS
Status: DISCONTINUED | OUTPATIENT
Start: 2018-09-02 | End: 2018-09-03

## 2018-09-02 RX ORDER — ONDANSETRON 8 MG/1
8 TABLET, ORALLY DISINTEGRATING ORAL EVERY 8 HOURS PRN
Status: DISCONTINUED | OUTPATIENT
Start: 2018-09-02 | End: 2018-09-13 | Stop reason: HOSPADM

## 2018-09-02 RX ADMIN — INSULIN ASPART 1 UNITS: 100 INJECTION, SOLUTION INTRAVENOUS; SUBCUTANEOUS at 11:09

## 2018-09-02 RX ADMIN — KETOROLAC TROMETHAMINE 15 MG: 30 INJECTION, SOLUTION INTRAMUSCULAR at 06:09

## 2018-09-02 RX ADMIN — KETOROLAC TROMETHAMINE 15 MG: 30 INJECTION, SOLUTION INTRAMUSCULAR at 11:09

## 2018-09-02 RX ADMIN — INSULIN DETEMIR 8 UNITS: 100 INJECTION, SOLUTION SUBCUTANEOUS at 11:09

## 2018-09-02 RX ADMIN — POTASSIUM CHLORIDE 40 MEQ: 20 SOLUTION ORAL at 11:09

## 2018-09-02 RX ADMIN — CEFTRIAXONE SODIUM 1 G: 1 INJECTION, POWDER, FOR SOLUTION INTRAMUSCULAR; INTRAVENOUS at 07:09

## 2018-09-02 RX ADMIN — ONDANSETRON 8 MG: 8 TABLET, ORALLY DISINTEGRATING ORAL at 11:09

## 2018-09-02 RX ADMIN — SODIUM CHLORIDE 1000 ML: 0.9 INJECTION, SOLUTION INTRAVENOUS at 06:09

## 2018-09-02 RX ADMIN — ONDANSETRON 4 MG: 2 INJECTION, SOLUTION INTRAMUSCULAR; INTRAVENOUS at 06:09

## 2018-09-02 RX ADMIN — SODIUM CHLORIDE 1000 ML: 0.9 INJECTION, SOLUTION INTRAVENOUS at 07:09

## 2018-09-02 RX ADMIN — Medication 400 MG: at 07:09

## 2018-09-02 RX ADMIN — VANCOMYCIN HYDROCHLORIDE 1000 MG: 1 INJECTION, POWDER, LYOPHILIZED, FOR SOLUTION INTRAVENOUS at 10:09

## 2018-09-02 RX ADMIN — ACETAMINOPHEN 650 MG: 325 TABLET, FILM COATED ORAL at 10:09

## 2018-09-02 NOTE — ED PROVIDER NOTES
Encounter Date: 2018       History     Chief Complaint   Patient presents with    Nasal Congestion     A 39-year-old female with history of diabetes presents with 5 days of generalized weakness.  It is associated with subjective fever, sweats and chills.  She has generalized body aches.  She has had several days of nausea and vomiting.  She has had decreased oral intake during this time.  She reports that she has had a cough productive of thick mucus.  No sick contacts.  Symptoms have been constant and progressively worsening without improvement.        The history is provided by the patient.     Review of patient's allergies indicates:   Allergen Reactions    Bactrim [sulfamethoxazole-trimethoprim]      Past Medical History:   Diagnosis Date    Diabetes mellitus      Past Surgical History:   Procedure Laterality Date     SECTION, CLASSIC      TUBAL LIGATION       History reviewed. No pertinent family history.  Social History     Tobacco Use    Smoking status: Current Every Day Smoker     Packs/day: 0.50     Years: 7.00     Pack years: 3.50     Types: Cigarettes    Smokeless tobacco: Never Used   Substance Use Topics    Alcohol use: No    Drug use: No     Review of Systems   Constitutional: Positive for activity change, appetite change, chills, diaphoresis and fever.   HENT: Negative for sore throat.    Respiratory: Positive for cough. Negative for shortness of breath.    Cardiovascular: Negative for chest pain.   Gastrointestinal: Positive for nausea and vomiting.   Genitourinary: Negative for dysuria.   Musculoskeletal: Negative for back pain.   Skin: Negative for rash.   Neurological: Negative for weakness.   Hematological: Does not bruise/bleed easily.   All other systems reviewed and are negative.      Physical Exam     Initial Vitals [18 1708]   BP Pulse Resp Temp SpO2   107/64 (!) 127 16 99 °F (37.2 °C) 100 %      MAP       --         Physical Exam    Vitals  reviewed.  Constitutional: Vital signs are normal. She appears well-developed and well-nourished. She is diaphoretic. She appears ill.   HENT:   Head: Normocephalic and atraumatic.   Mouth/Throat: Oropharynx is clear and moist.   Eyes: Conjunctivae and EOM are normal. Pupils are equal, round, and reactive to light.   Neck: Trachea normal and normal range of motion. Neck supple.   Cardiovascular: Regular rhythm, normal heart sounds and normal pulses. Tachycardia present.    Pulmonary/Chest: Breath sounds normal. No respiratory distress.   Abdominal: Soft. Normal appearance and bowel sounds are normal. There is no tenderness.   Musculoskeletal: Normal range of motion.   Neurological: She is alert and oriented to person, place, and time.   Skin: Skin is warm.         ED Course   Procedures  Labs Reviewed   CBC W/ AUTO DIFFERENTIAL - Abnormal; Notable for the following components:       Result Value    WBC 18.15 (*)     MCV 80 (*)     Immature Granulocytes 0.6 (*)     Gran # (ANC) 16.3 (*)     Immature Grans (Abs) 0.10 (*)     Lymph # 0.5 (*)     Mono # 1.2 (*)     Gran% 89.8 (*)     Lymph% 2.9 (*)     All other components within normal limits   COMPREHENSIVE METABOLIC PANEL - Abnormal; Notable for the following components:    Sodium 131 (*)     Chloride 94 (*)     Glucose 383 (*)     Albumin 3.4 (*)     Alkaline Phosphatase 145 (*)     All other components within normal limits   MAGNESIUM - Abnormal; Notable for the following components:    Magnesium 1.5 (*)     All other components within normal limits   URINALYSIS, REFLEX TO URINE CULTURE - Abnormal; Notable for the following components:    Appearance, UA Hazy (*)     Glucose, UA 3+ (*)     Ketones, UA 1+ (*)     Occult Blood UA 1+ (*)     Leukocytes, UA Trace (*)     All other components within normal limits    Narrative:     Preferred Collection Type->Urine, Clean Catch   URINALYSIS MICROSCOPIC - Abnormal; Notable for the following components:    WBC, UA 45 (*)      All other components within normal limits    Narrative:     Preferred Collection Type->Urine, Clean Catch   PROCALCITONIN - Abnormal; Notable for the following components:    Procalcitonin 11.01 (*)     All other components within normal limits    Narrative:     adding on procalcitonin per celestine rodriguez md 19:17  09/02/2018    GRAM STAIN   GRAM STAIN    Narrative:     add on test urine gram stain per dr celestine rodriguez order #[id number]   09/02/2018  20:21    CULTURE, URINE   CULTURE, BLOOD   CULTURE, BLOOD   PROCALCITONIN   LACTIC ACID, PLASMA          Imaging Results          X-Ray Chest PA And Lateral (Final result)  Result time 09/02/18 19:56:07    Final result by Corky Abdi MD (09/02/18 19:56:07)                 Impression:      No acute radiographic abnormality to explain the patient's cough.    Electronically signed by resident: Malachi Sands  Date:    09/02/2018  Time:    19:50    Electronically signed by: Corky Abdi MD  Date:    09/02/2018  Time:    19:56             Narrative:    EXAMINATION:  XR CHEST PA AND LATERAL    CLINICAL HISTORY:  Cough    TECHNIQUE:  PA and lateral views of the chest were performed.    COMPARISON:  No relevant, available prior.    FINDINGS:  Cardiomediastinal silhouette is unremarkable.  Trachea is midline.  Lungs are equally well expanded.  No large consolidative opacities.  No large pleural effusion.  No pneumothorax.  Pulmonary vascular pattern is unremarkable.  Upper abdomen shows no significant abnormality.  Osseous structures are intact.                              X-Rays:   Independently Interpreted Readings:   Chest X-Ray: Normal heart size.  No infiltrates.  No acute abnormalities.     Medical Decision Making:   History:   Old Medical Records: I decided to obtain old medical records.  Initial Assessment:   Emergent evaluation of sick symptoms.  Initial concern to include viral syndrome, pneumonia, pyelonephritis, sepsis.  Patient is afebrile here, though she is  diaphoretic and may have just broke her fever prior to arrival.  We will give IV fluids, check labs.  We will get imaging to evaluate for pneumonia.  Independently Interpreted Test(s):   I have ordered and independently interpreted X-rays - see prior notes.  Clinical Tests:   Lab Tests: Ordered and Reviewed  Radiological Study: Ordered and Reviewed              Attending Attestation:             Attending ED Notes:   Labs were concerning for elevated white blood cell count and procalcitonin.  Lactic acid is normal.  Patient is hyperglycemic.  There are signs of infection on urinalysis.  Culture and Gram stain have been sent.  Antibiotics started with Rocephin.  Has received 30 per kg IV fluid resuscitation.  She is hemodynamically stable for admission to the floor.             Clinical Impression:   The primary encounter diagnosis was Pyelonephritis. Diagnoses of Cough, Leukocytosis, unspecified type, Hyperglycemia, and Hypomagnesemia were also pertinent to this visit.      Disposition:   Disposition: Admitted  Condition: Shira Umana MD  09/02/18 7001

## 2018-09-02 NOTE — ED TRIAGE NOTES
Presents to ER with complaint of generalized body aches and head congestion for 6 days.  Patient's name and date of birth checked and is correct.  LOC: The patient is awake, alert and aware of environment with an appropriate affect, the patient is oriented x 3 and speaking appropriately.  APPEARANCE: Patient resting comfortably and in no acute distress, patient is clean and well groomed, patient's clothing is properly fastened.  CARDIOVASCULAR:  Heart rate regular and even with no peripheral edema noted.  SKIN: The skin is warm and dry, patient has normal skin turgor and moist mucus membranes, skin intact, no breakdown or brusing noted. MUSKULOSKELETAL: Patient moving all extremities well, no obvious swelling or deformities noted.  RESPIRATORY: Airway is open and patent, respirations are spontaneous, patient has a normal effort and rate.

## 2018-09-02 NOTE — ED NOTES
Patient also has a deep congested cough that is productive but she is not able to state the color of the sputum.

## 2018-09-03 PROBLEM — L08.9 INFECTED SKIN ULCER WITH FAT LAYER EXPOSED: Status: ACTIVE | Noted: 2018-09-03

## 2018-09-03 PROBLEM — L97.509 FOOT ULCER DUE TO SECONDARY DM: Status: ACTIVE | Noted: 2018-09-03

## 2018-09-03 PROBLEM — L98.492 INFECTED SKIN ULCER WITH FAT LAYER EXPOSED: Status: ACTIVE | Noted: 2018-09-03

## 2018-09-03 PROBLEM — M86.471 CHRONIC OSTEOMYELITIS OF RIGHT FOOT WITH DRAINING SINUS: Status: ACTIVE | Noted: 2018-09-03

## 2018-09-03 PROBLEM — E13.621 FOOT ULCER DUE TO SECONDARY DM: Status: ACTIVE | Noted: 2018-09-03

## 2018-09-03 PROBLEM — R53.81 DEBILITY: Status: ACTIVE | Noted: 2018-09-03

## 2018-09-03 LAB
ALBUMIN SERPL BCP-MCNC: 2.7 G/DL
ALP SERPL-CCNC: 103 U/L
ALT SERPL W/O P-5'-P-CCNC: 15 U/L
ANION GAP SERPL CALC-SCNC: 7 MMOL/L
AST SERPL-CCNC: 17 U/L
B-OH-BUTYR BLD STRIP-SCNC: 0.4 MMOL/L
BASOPHILS # BLD AUTO: 0.02 K/UL
BASOPHILS NFR BLD: 0.2 %
BILIRUB SERPL-MCNC: 0.5 MG/DL
BUN SERPL-MCNC: 15 MG/DL
CALCIUM SERPL-MCNC: 8.5 MG/DL
CHLORIDE SERPL-SCNC: 98 MMOL/L
CO2 SERPL-SCNC: 25 MMOL/L
CREAT SERPL-MCNC: 0.9 MG/DL
CRP SERPL-MCNC: 178.9 MG/L
DEPRECATED S PYO AG THROAT QL EIA: NEGATIVE
DIFFERENTIAL METHOD: ABNORMAL
EOSINOPHIL # BLD AUTO: 0 K/UL
EOSINOPHIL NFR BLD: 0 %
ERYTHROCYTE [DISTWIDTH] IN BLOOD BY AUTOMATED COUNT: 12.5 %
ERYTHROCYTE [SEDIMENTATION RATE] IN BLOOD BY WESTERGREN METHOD: 65 MM/HR
EST. GFR  (AFRICAN AMERICAN): >60 ML/MIN/1.73 M^2
EST. GFR  (NON AFRICAN AMERICAN): >60 ML/MIN/1.73 M^2
ESTIMATED AVG GLUCOSE: 306 MG/DL
FLUAV AG SPEC QL IA: NEGATIVE
FLUBV AG SPEC QL IA: NEGATIVE
GLUCOSE SERPL-MCNC: 344 MG/DL
HBA1C MFR BLD HPLC: 12.3 %
HCT VFR BLD AUTO: 34.2 %
HGB BLD-MCNC: 11.5 G/DL
HIV 1+2 AB+HIV1 P24 AG SERPL QL IA: NEGATIVE
IMM GRANULOCYTES # BLD AUTO: 0.06 K/UL
IMM GRANULOCYTES NFR BLD AUTO: 0.5 %
LYMPHOCYTES # BLD AUTO: 0.6 K/UL
LYMPHOCYTES NFR BLD: 5 %
MAGNESIUM SERPL-MCNC: 2.5 MG/DL
MCH RBC QN AUTO: 27.2 PG
MCHC RBC AUTO-ENTMCNC: 33.6 G/DL
MCV RBC AUTO: 81 FL
MONOCYTES # BLD AUTO: 0.9 K/UL
MONOCYTES NFR BLD: 7.9 %
NEUTROPHILS # BLD AUTO: 9.4 K/UL
NEUTROPHILS NFR BLD: 86.4 %
NRBC BLD-RTO: 0 /100 WBC
PHOSPHATE SERPL-MCNC: 3.6 MG/DL
PLATELET # BLD AUTO: 143 K/UL
PMV BLD AUTO: 11.6 FL
POCT GLUCOSE: 270 MG/DL (ref 70–110)
POCT GLUCOSE: 293 MG/DL (ref 70–110)
POCT GLUCOSE: 321 MG/DL (ref 70–110)
POTASSIUM SERPL-SCNC: 4 MMOL/L
PROT SERPL-MCNC: 6.2 G/DL
RBC # BLD AUTO: 4.23 M/UL
SODIUM SERPL-SCNC: 130 MMOL/L
SPECIMEN SOURCE: NORMAL
WBC # BLD AUTO: 10.93 K/UL

## 2018-09-03 PROCEDURE — 80053 COMPREHEN METABOLIC PANEL: CPT

## 2018-09-03 PROCEDURE — 36415 COLL VENOUS BLD VENIPUNCTURE: CPT

## 2018-09-03 PROCEDURE — 85025 COMPLETE CBC W/AUTO DIFF WBC: CPT

## 2018-09-03 PROCEDURE — 83036 HEMOGLOBIN GLYCOSYLATED A1C: CPT

## 2018-09-03 PROCEDURE — 99233 SBSQ HOSP IP/OBS HIGH 50: CPT | Mod: ,,, | Performed by: STUDENT IN AN ORGANIZED HEALTH CARE EDUCATION/TRAINING PROGRAM

## 2018-09-03 PROCEDURE — 85652 RBC SED RATE AUTOMATED: CPT

## 2018-09-03 PROCEDURE — 63600175 PHARM REV CODE 636 W HCPCS: Performed by: STUDENT IN AN ORGANIZED HEALTH CARE EDUCATION/TRAINING PROGRAM

## 2018-09-03 PROCEDURE — 25000003 PHARM REV CODE 250: Performed by: STUDENT IN AN ORGANIZED HEALTH CARE EDUCATION/TRAINING PROGRAM

## 2018-09-03 PROCEDURE — 86140 C-REACTIVE PROTEIN: CPT

## 2018-09-03 PROCEDURE — 83735 ASSAY OF MAGNESIUM: CPT

## 2018-09-03 PROCEDURE — 99233 SBSQ HOSP IP/OBS HIGH 50: CPT | Mod: ,,, | Performed by: PODIATRIST

## 2018-09-03 PROCEDURE — A9585 GADOBUTROL INJECTION: HCPCS | Performed by: STUDENT IN AN ORGANIZED HEALTH CARE EDUCATION/TRAINING PROGRAM

## 2018-09-03 PROCEDURE — 11000001 HC ACUTE MED/SURG PRIVATE ROOM

## 2018-09-03 PROCEDURE — S4991 NICOTINE PATCH NONLEGEND: HCPCS | Performed by: STUDENT IN AN ORGANIZED HEALTH CARE EDUCATION/TRAINING PROGRAM

## 2018-09-03 PROCEDURE — 84100 ASSAY OF PHOSPHORUS: CPT

## 2018-09-03 PROCEDURE — 82010 KETONE BODYS QUAN: CPT

## 2018-09-03 PROCEDURE — 25500020 PHARM REV CODE 255: Performed by: STUDENT IN AN ORGANIZED HEALTH CARE EDUCATION/TRAINING PROGRAM

## 2018-09-03 RX ORDER — DULOXETIN HYDROCHLORIDE 30 MG/1
30 CAPSULE, DELAYED RELEASE ORAL DAILY
Status: DISCONTINUED | OUTPATIENT
Start: 2018-09-03 | End: 2018-09-08

## 2018-09-03 RX ORDER — GADOBUTROL 604.72 MG/ML
6 INJECTION INTRAVENOUS
Status: COMPLETED | OUTPATIENT
Start: 2018-09-03 | End: 2018-09-03

## 2018-09-03 RX ORDER — INSULIN ASPART 100 [IU]/ML
1-10 INJECTION, SOLUTION INTRAVENOUS; SUBCUTANEOUS
Status: DISCONTINUED | OUTPATIENT
Start: 2018-09-03 | End: 2018-09-04

## 2018-09-03 RX ORDER — GABAPENTIN 800 MG/1
800 TABLET ORAL 3 TIMES DAILY
Status: ON HOLD | COMMUNITY
End: 2018-09-12 | Stop reason: HOSPADM

## 2018-09-03 RX ORDER — ESCITALOPRAM OXALATE 10 MG/1
10 TABLET ORAL DAILY
COMMUNITY

## 2018-09-03 RX ORDER — CALCIUM CARBONATE 200(500)MG
1 TABLET,CHEWABLE ORAL DAILY PRN
COMMUNITY

## 2018-09-03 RX ORDER — ERGOCALCIFEROL 1.25 MG/1
50000 CAPSULE ORAL
COMMUNITY

## 2018-09-03 RX ORDER — HYDROCODONE BITARTRATE AND ACETAMINOPHEN 7.5; 325 MG/1; MG/1
1 TABLET ORAL EVERY 6 HOURS PRN
Status: DISCONTINUED | OUTPATIENT
Start: 2018-09-03 | End: 2018-09-13 | Stop reason: HOSPADM

## 2018-09-03 RX ORDER — ENOXAPARIN SODIUM 100 MG/ML
40 INJECTION SUBCUTANEOUS EVERY 24 HOURS
Status: DISCONTINUED | OUTPATIENT
Start: 2018-09-03 | End: 2018-09-13 | Stop reason: HOSPADM

## 2018-09-03 RX ORDER — IBUPROFEN 200 MG
1 TABLET ORAL DAILY
Status: DISCONTINUED | OUTPATIENT
Start: 2018-09-03 | End: 2018-09-13 | Stop reason: HOSPADM

## 2018-09-03 RX ORDER — MUPIROCIN 20 MG/G
OINTMENT TOPICAL
COMMUNITY

## 2018-09-03 RX ORDER — CYCLOBENZAPRINE HCL 10 MG
10 TABLET ORAL 3 TIMES DAILY
COMMUNITY

## 2018-09-03 RX ORDER — INSULIN GLARGINE 100 [IU]/ML
10 INJECTION, SOLUTION SUBCUTANEOUS NIGHTLY
Status: ON HOLD | COMMUNITY
End: 2018-09-12 | Stop reason: HOSPADM

## 2018-09-03 RX ORDER — VANCOMYCIN HCL 900 MCG/MG
1 POWDER (GRAM) MISCELLANEOUS DAILY PRN
COMMUNITY

## 2018-09-03 RX ADMIN — ONDANSETRON 8 MG: 8 TABLET, ORALLY DISINTEGRATING ORAL at 11:09

## 2018-09-03 RX ADMIN — INSULIN ASPART 8 UNITS: 100 INJECTION, SOLUTION INTRAVENOUS; SUBCUTANEOUS at 12:09

## 2018-09-03 RX ADMIN — DULOXETINE HYDROCHLORIDE 30 MG: 30 CAPSULE, DELAYED RELEASE ORAL at 11:09

## 2018-09-03 RX ADMIN — GABAPENTIN 300 MG: 300 CAPSULE ORAL at 09:09

## 2018-09-03 RX ADMIN — HYDROCODONE BITARTRATE AND ACETAMINOPHEN 1 TABLET: 7.5; 325 TABLET ORAL at 08:09

## 2018-09-03 RX ADMIN — MAGNESIUM SULFATE HEPTAHYDRATE 3 G: 500 INJECTION, SOLUTION INTRAMUSCULAR; INTRAVENOUS at 02:09

## 2018-09-03 RX ADMIN — GABAPENTIN 300 MG: 300 CAPSULE ORAL at 08:09

## 2018-09-03 RX ADMIN — INSULIN DETEMIR 10 UNITS: 100 INJECTION, SOLUTION SUBCUTANEOUS at 10:09

## 2018-09-03 RX ADMIN — PIPERACILLIN AND TAZOBACTAM 4.5 G: 4; .5 INJECTION, POWDER, LYOPHILIZED, FOR SOLUTION INTRAVENOUS; PARENTERAL at 05:09

## 2018-09-03 RX ADMIN — INSULIN ASPART 6 UNITS: 100 INJECTION, SOLUTION INTRAVENOUS; SUBCUTANEOUS at 10:09

## 2018-09-03 RX ADMIN — HYDROCODONE BITARTRATE AND ACETAMINOPHEN 1 TABLET: 7.5; 325 TABLET ORAL at 01:09

## 2018-09-03 RX ADMIN — HYDROCODONE BITARTRATE AND ACETAMINOPHEN 1 TABLET: 7.5; 325 TABLET ORAL at 05:09

## 2018-09-03 RX ADMIN — PIPERACILLIN AND TAZOBACTAM 4.5 G: 4; .5 INJECTION, POWDER, LYOPHILIZED, FOR SOLUTION INTRAVENOUS; PARENTERAL at 09:09

## 2018-09-03 RX ADMIN — GABAPENTIN 300 MG: 300 CAPSULE ORAL at 03:09

## 2018-09-03 RX ADMIN — INSULIN ASPART 6 UNITS: 100 INJECTION, SOLUTION INTRAVENOUS; SUBCUTANEOUS at 09:09

## 2018-09-03 RX ADMIN — ENOXAPARIN SODIUM 40 MG: 100 INJECTION SUBCUTANEOUS at 05:09

## 2018-09-03 RX ADMIN — PIPERACILLIN AND TAZOBACTAM 4.5 G: 4; .5 INJECTION, POWDER, LYOPHILIZED, FOR SOLUTION INTRAVENOUS; PARENTERAL at 01:09

## 2018-09-03 RX ADMIN — KETOROLAC TROMETHAMINE 15 MG: 30 INJECTION, SOLUTION INTRAMUSCULAR at 12:09

## 2018-09-03 RX ADMIN — INSULIN ASPART 2 UNITS: 100 INJECTION, SOLUTION INTRAVENOUS; SUBCUTANEOUS at 05:09

## 2018-09-03 RX ADMIN — INSULIN DETEMIR 10 UNITS: 100 INJECTION, SOLUTION SUBCUTANEOUS at 09:09

## 2018-09-03 RX ADMIN — GADOBUTROL 6 ML: 604.72 INJECTION INTRAVENOUS at 09:09

## 2018-09-03 NOTE — NURSING
Report received ,care assumed, patient arrived via stretcher AAO x4. Pt lying supine in bed, Pt denies pain or any other concerns at this time. See assessment. Patient oriented to room. Bed in lowest position, side rails up x 2, bed wheela locked and call light within reach, NADK, will continue to monitor.

## 2018-09-03 NOTE — H&P
[facility] Medicine  History & Physical    Patient Name: Mariah Floey  MRN: [id number]  Admission Date: 2018  Attending Physician: Joe Helton MD   Primary Care Provider: Lukas Stein MD    [facility] Medicine Team: [facility] MED 5 Jewel Nguyen MD     Patient information was obtained from patient, past medical records and ER records.     Subjective:     Principal Problem:sepsis    Chief Complaint:   Chief Complaint   Patient presents with    Nasal Congestion        HPI: 46 yo F  with pmhx of fibromyalgia and DM2, in her usual state of health until 5 days prior to admission when she developed a sore throat with productive cough, which resolved after 2 days. Patient then developed myalgias, mainly around her left scapula and left CVA which she describes as a muscle stretching pain, 7 of 10 intensity, constant, associated with subjective fever, chills, generalized body aches, headache, sharp right ear paip, nausea, vomiting (once daily for past 4 days in the morning, nonbloody). No alleviating or aggravating factors. Patient tried ibuprofen for the pain at home which did not help. No chest or abdomian pain, diarrhea, constipation, changes in urinary habits, dysuria, hematuria, vaginal discharge or odors. Not sexually active. Has a ulcer on plantar aspect of right great toe which she says has been there for 2 months and has not noticed any pain, warmth, or discharge from it, although on examination there was obvious purulent discharge once dressing was removed. 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 Denies any recent trauma, sick contacts, travel out of the country.     Past Medical History:   Diagnosis Date    Diabetes mellitus        Past Surgical History:   Procedure Laterality Date     SECTION, CLASSIC      TUBAL LIGATION         Review of patient's allergies indicates:   Allergen Reactions    Bactrim  [sulfamethoxazole-trimethoprim]        No current facility-administered medications on file prior to encounter.      Current Outpatient Medications on File Prior to Encounter   Medication Sig    gabapentin (GRALISE) 600 mg Tb24 Take 1,800 mg by mouth daily with dinner or evening meal. Take 3 tablets with evening meals     glimepiride (AMARYL) 4 MG tablet Take 1 tablet (4 mg total) by mouth before breakfast.    [DISCONTINUED] oxyCODONE-acetaminophen (PERCOCET) 5-325 mg per tablet Take 1 tablet by mouth every 4 (four) hours as needed for Pain.     Family History     None        Tobacco Use    Smoking status: Current Every Day Smoker     Packs/day: 0.50     Years: 7.00     Pack years: 3.50     Types: Cigarettes    Smokeless tobacco: Never Used   Substance and Sexual Activity    Alcohol use: No    Drug use: No    Sexual activity: Not Currently     Review of Systems   Constitutional: Positive for appetite change, chills, diaphoresis, fatigue and fever.   HENT: Positive for congestion, ear pain, rhinorrhea and sore throat. Negative for mouth sores, nosebleeds, postnasal drip, sneezing, tinnitus and trouble swallowing.    Eyes: Negative for photophobia, pain, discharge and visual disturbance.   Respiratory: Positive for cough. Negative for choking, chest tightness, shortness of breath, wheezing and stridor.    Cardiovascular: Negative for chest pain, palpitations and leg swelling.   Gastrointestinal: Positive for nausea and vomiting. Negative for abdominal distention, abdominal pain, anal bleeding, blood in stool, constipation and diarrhea.   Endocrine: Negative for polydipsia, polyphagia and polyuria.   Genitourinary: Negative for difficulty urinating, flank pain, hematuria, pelvic pain, vaginal bleeding, vaginal discharge and vaginal pain.   Musculoskeletal: Positive for back pain and myalgias. Negative for arthralgias, joint swelling, neck pain and neck stiffness.   Skin: Positive for wound. Negative for rash.    Allergic/Immunologic: Negative for immunocompromised state.   Neurological: Positive for headaches. Negative for dizziness, tremors, facial asymmetry, weakness, light-headedness and numbness.   Hematological: Negative for adenopathy. Does not bruise/bleed easily.     Objective:     Vital Signs (Most Recent):  Temp: (!) 101.5 °F (38.6 °C) (09/02/18 2134)  Pulse: (!) 140 (09/02/18 2134)  Resp: 20 (09/02/18 2134)  BP: (!) 157/64 (09/02/18 2134)  SpO2: 97 % (09/02/18 2134) Vital Signs (24h Range):  Temp:  [98 °F (36.7 °C)-101.6 °F (38.7 °C)] 101.5 °F (38.6 °C)  Pulse:  [127-140] 140  Resp:  [16-20] 20  SpO2:  [95 %-100 %] 97 %  BP: (107-157)/(64-92) 157/64     Weight: 61.2 kg (135 lb)  Body mass index is 23.91 kg/m².    Physical Exam   Constitutional: She is oriented to person, place, and time. She appears well-developed and well-nourished.   Moderate distress, laying on left side,has chills    HENT:   Head: Normocephalic and atraumatic.   Right Ear: External ear normal.   Left Ear: External ear normal.   Mouth/Throat: Oropharynx is clear and moist. No oropharyngeal exudate.   Eyes: Conjunctivae and EOM are normal. Pupils are equal, round, and reactive to light. Right eye exhibits no discharge. Left eye exhibits no discharge. No scleral icterus.   Neck: Normal range of motion. Neck supple. No thyromegaly present.   Cardiovascular: Normal rate, regular rhythm, normal heart sounds and intact distal pulses. Exam reveals no gallop and no friction rub.   No murmur heard.  Pulmonary/Chest: Effort normal and breath sounds normal. No stridor. No respiratory distress. She has no wheezes. She has no rales. She exhibits no tenderness.   Abdominal: Soft. Bowel sounds are normal. She exhibits no distension and no mass. There is no tenderness. There is no guarding.   Musculoskeletal: Normal range of motion. She exhibits tenderness. She exhibits no edema or deformity.   1 cm ulcer on plantar aspect of right great toe, dark purulent  discharge, mild edema, no erythema.   Generalized mild tenderness to palpation of lower back around lumbar region, L > R  No acute CVA tenderness   Lymphadenopathy:     She has no cervical adenopathy.   Neurological: She is alert and oriented to person, place, and time.   Skin: Skin is warm and dry. Capillary refill takes less than 2 seconds. No rash noted. No erythema.         CRANIAL NERVES     CN III, IV, VI   Pupils are equal, round, and reactive to light.  Extraocular motions are normal.        Significant Labs:   Recent Lab Results       09/02/18  1920 09/02/18  1811 09/02/18  1810      Immature Granulocytes   0.6     Immature Grans (Abs)   0.10  Comment:  Mild elevation in immature granulocytes is non specific and   can be seen in a variety of conditions including stress response,   acute inflammation, trauma and pregnancy. Correlation with other   laboratory and clinical findings is essential.       Procalcitonin   11.01  Comment:  Please re-baseline procalcitonin if a patient is transferred from  other facilities to [facility].  A concentration < 0.25 ng/mL represents a low risk bacterial   infection.  Procalcitonin may not be accurate among patients with localized   infection, recent trauma or major surgery, immunosuppressed state,   invasive fungal infection, renal dysfunction. Decisions regarding   initiation or continuation of antibiotic therapy should not be based   solely on procalcitonin levels.       Albumin   3.4     Alkaline Phosphatase   145     ALT   14     Anion Gap   14     Appearance, UA  Hazy      AST   14     Bacteria, UA  Rare      Baso #   0.05     Basophil%   0.3     Bilirubin (UA)  Negative      Total Bilirubin   0.6  Comment:  For infants and newborns, interpretation of results should be based  on gestational age, weight and in agreement with clinical  observations.  Premature Infant recommended reference ranges:  Up to 24 hours.............<8.0 mg/dL  Up to 48  hours............<12.0 mg/dL  3-5 days..................<15.0 mg/dL  6-29 days.................<15.0 mg/dL       BUN, Bld   16     Calcium   9.6     Chloride   94     CO2   23     Color, UA  Yellow      Creatinine   0.9     Differential Method   Automated     eGFR if    >60.0     eGFR if non    >60.0  Comment:  Calculation used to obtain the estimated glomerular filtration  rate (eGFR) is the CKD-EPI equation.        Eos #   0.0     Eosinophil%   0.1     Glucose   383     Glucose, UA  3+      Gram Stain Result  Few WBC's        Few Gram positive cocci        Few Gram negative rods      Gran # (ANC)   16.3     Gran%   89.8     Hematocrit   40.6     Hemoglobin   13.8     Ketones, UA  1+      Lactate, Osiel 1.6  Comment:  Falsely low lactic acid results can be found in samples   containing >=13.0 mg/dL total bilirubin and/or >=3.5 mg/dL   direct bilirubin.         Leukocytes, UA  Trace      Lymph #   0.5     Lymph%   2.9     Magnesium   1.5     MCH   27.3     MCHC   34.0     MCV   80     Microscopic Comment  SEE COMMENT  Comment:  Other formed elements not mentioned in the report are not   present in the microscopic examination.         Mono #   1.2     Mono%   6.3     MPV   11.8     Nitrite, UA  Negative      nRBC   0     Occult Blood UA  1+      pH, UA  5.0      Platelets   180     Potassium   3.6     Total Protein   7.8     Protein, UA  Negative  Comment:  Recommend a 24 hour urine protein or a urine   protein/creatinine ratio if globulin induced proteinuria is  clinically suspected.        RBC   5.06     RBC, UA  2      RDW   12.2     Sodium   131     Specific Gravity, UA  1.030      Specimen UA  Urine, Clean Catch      Squam Epithel, UA  4      Urobilinogen, UA  Negative      WBC, UA  45      WBC   18.15     Yeast, UA  None          All pertinent labs within the past 24 hours have been reviewed.    Significant Imaging: I have reviewed and interpreted all pertinent imaging  results/findings within the past 24 hours.    Assessment/Plan:     Cough    -CXR unremarkable  -sputum culture if productive cough continues   -will consider coverage for atypical if pt not clinically improving with vanc/zosyn           Fibromyalgia    -resume gabapentin            DM2 (diabetes mellitus, type 2)    -takes insulin at home, patient think it's 10 units at night. Also on glimepiride BID   -low correctional scale, detemir 8U qHS, accuchecks qAC qHS   -goal CBG < 180         Tobacco abuse    - for tobacco cessation           UTI (urinary tract infection)    - rocephin given in ED for possible UTI           Leukocytosis      -see plan for sepsis         Sepsis    - 3/4 SIRS  - suspect source is infected right great toe ulcer  - purulent discharge from ulcer swabbed and sent for culture  -podiatry consulted  -MRI w/contrast ordered to r/o osteomyelitis   -started on vanc and zosyn IV   -received rocephin in ED   -given hx of sore throat, cough, myalgia - rapid strep, influenza, HIV, and monospot testing sent.   -f/u blood cultures   - 2L NS bolus in ED (~30cc/kg)   - tylenol PRN for mild pain (1-3) and fever, toradol PRN for moderate (4-7)             Diabetic ulcer of toe of right foot    -podiatry consulted  -mri w/contrast ordered          VTE Risk Mitigation (From admission, onward)        Ordered     IP VTE LOW RISK PATIENT  Once      09/02/18 2155     Place sequential compression device  Until discontinued      09/02/18 2155     IP VTE LOW RISK PATIENT  Once      09/02/18 2155         Plan discussed with attending Dr. Esthela MD, further recommendations as per attending addendum. Please feel free to call with any questions or concerns.    Jewel Nguyen MD  Resident Physician, PGY1    Jewel Nguyen MD  Department of Hospital Medicine   [facility]

## 2018-09-03 NOTE — HPI
Mariah Foley is a 47 y.o. female who  has a past medical history of Diabetes mellitus.    Patient Admited for pyelonephritis.  Consulted to Podiatry for right great toe wound.  Patient reports having the wound for several months now. She has been on disability since the wound because it is painful. Patient has close follow up with Dr. Vincent Lazo DPM for the wound.

## 2018-09-03 NOTE — SUBJECTIVE & OBJECTIVE
Past Medical History:   Diagnosis Date    Diabetes mellitus        Past Surgical History:   Procedure Laterality Date     SECTION, CLASSIC      TUBAL LIGATION         Review of patient's allergies indicates:   Allergen Reactions    Bactrim [sulfamethoxazole-trimethoprim]        No current facility-administered medications on file prior to encounter.      Current Outpatient Medications on File Prior to Encounter   Medication Sig    gabapentin (GRALISE) 600 mg Tb24 Take 1,800 mg by mouth daily with dinner or evening meal. Take 3 tablets with evening meals     glimepiride (AMARYL) 4 MG tablet Take 1 tablet (4 mg total) by mouth before breakfast.    [DISCONTINUED] oxyCODONE-acetaminophen (PERCOCET) 5-325 mg per tablet Take 1 tablet by mouth every 4 (four) hours as needed for Pain.     Family History     None        Tobacco Use    Smoking status: Current Every Day Smoker     Packs/day: 0.50     Years: 7.00     Pack years: 3.50     Types: Cigarettes    Smokeless tobacco: Never Used   Substance and Sexual Activity    Alcohol use: No    Drug use: No    Sexual activity: Not Currently     Review of Systems   Constitutional: Positive for appetite change, chills, diaphoresis, fatigue and fever.   HENT: Positive for congestion, ear pain, rhinorrhea and sore throat. Negative for mouth sores, nosebleeds, postnasal drip, sneezing, tinnitus and trouble swallowing.    Eyes: Negative for photophobia, pain, discharge and visual disturbance.   Respiratory: Positive for cough. Negative for choking, chest tightness, shortness of breath, wheezing and stridor.    Cardiovascular: Negative for chest pain, palpitations and leg swelling.   Gastrointestinal: Positive for nausea and vomiting. Negative for abdominal distention, abdominal pain, anal bleeding, blood in stool, constipation and diarrhea.   Endocrine: Negative for polydipsia, polyphagia and polyuria.   Genitourinary: Negative for difficulty urinating, flank pain,  hematuria, pelvic pain, vaginal bleeding, vaginal discharge and vaginal pain.   Musculoskeletal: Positive for back pain and myalgias. Negative for arthralgias, joint swelling, neck pain and neck stiffness.   Skin: Positive for wound. Negative for rash.   Allergic/Immunologic: Negative for immunocompromised state.   Neurological: Positive for headaches. Negative for dizziness, tremors, facial asymmetry, weakness, light-headedness and numbness.   Hematological: Negative for adenopathy. Does not bruise/bleed easily.     Objective:     Vital Signs (Most Recent):  Temp: (!) 101.5 °F (38.6 °C) (09/02/18 2134)  Pulse: (!) 140 (09/02/18 2134)  Resp: 20 (09/02/18 2134)  BP: (!) 157/64 (09/02/18 2134)  SpO2: 97 % (09/02/18 2134) Vital Signs (24h Range):  Temp:  [98 °F (36.7 °C)-101.6 °F (38.7 °C)] 101.5 °F (38.6 °C)  Pulse:  [127-140] 140  Resp:  [16-20] 20  SpO2:  [95 %-100 %] 97 %  BP: (107-157)/(64-92) 157/64     Weight: 61.2 kg (135 lb)  Body mass index is 23.91 kg/m².    Physical Exam   Constitutional: She is oriented to person, place, and time. She appears well-developed and well-nourished.   Moderate distress, laying on left side,has chills    HENT:   Head: Normocephalic and atraumatic.   Right Ear: External ear normal.   Left Ear: External ear normal.   Mouth/Throat: Oropharynx is clear and moist. No oropharyngeal exudate.   Eyes: Conjunctivae and EOM are normal. Pupils are equal, round, and reactive to light. Right eye exhibits no discharge. Left eye exhibits no discharge. No scleral icterus.   Neck: Normal range of motion. Neck supple. No thyromegaly present.   Cardiovascular: Normal rate, regular rhythm, normal heart sounds and intact distal pulses. Exam reveals no gallop and no friction rub.   No murmur heard.  Pulmonary/Chest: Effort normal and breath sounds normal. No stridor. No respiratory distress. She has no wheezes. She has no rales. She exhibits no tenderness.   Abdominal: Soft. Bowel sounds are normal.  She exhibits no distension and no mass. There is no tenderness. There is no guarding.   Musculoskeletal: Normal range of motion. She exhibits tenderness. She exhibits no edema or deformity.   1 cm ulcer on plantar aspect of right great toe, dark purulent discharge, mild edema, no erythema.   Generalized mild tenderness to palpation of lower back around lumbar region, L > R  No acute CVA tenderness   Lymphadenopathy:     She has no cervical adenopathy.   Neurological: She is alert and oriented to person, place, and time.   Skin: Skin is warm and dry. Capillary refill takes less than 2 seconds. No rash noted. No erythema.         CRANIAL NERVES     CN III, IV, VI   Pupils are equal, round, and reactive to light.  Extraocular motions are normal.        Significant Labs:   Recent Lab Results       09/02/18  1920 09/02/18  1811 09/02/18  1810      Immature Granulocytes   0.6     Immature Grans (Abs)   0.10  Comment:  Mild elevation in immature granulocytes is non specific and   can be seen in a variety of conditions including stress response,   acute inflammation, trauma and pregnancy. Correlation with other   laboratory and clinical findings is essential.       Procalcitonin   11.01  Comment:  Please re-baseline procalcitonin if a patient is transferred from  other facilities to [facility].  A concentration < 0.25 ng/mL represents a low risk bacterial   infection.  Procalcitonin may not be accurate among patients with localized   infection, recent trauma or major surgery, immunosuppressed state,   invasive fungal infection, renal dysfunction. Decisions regarding   initiation or continuation of antibiotic therapy should not be based   solely on procalcitonin levels.       Albumin   3.4     Alkaline Phosphatase   145     ALT   14     Anion Gap   14     Appearance, UA  Hazy      AST   14     Bacteria, UA  Rare      Baso #   0.05     Basophil%   0.3     Bilirubin (UA)  Negative      Total Bilirubin   0.6  Comment:  For  infants and newborns, interpretation of results should be based  on gestational age, weight and in agreement with clinical  observations.  Premature Infant recommended reference ranges:  Up to 24 hours.............<8.0 mg/dL  Up to 48 hours............<12.0 mg/dL  3-5 days..................<15.0 mg/dL  6-29 days.................<15.0 mg/dL       BUN, Bld   16     Calcium   9.6     Chloride   94     CO2   23     Color, UA  Yellow      Creatinine   0.9     Differential Method   Automated     eGFR if    >60.0     eGFR if non    >60.0  Comment:  Calculation used to obtain the estimated glomerular filtration  rate (eGFR) is the CKD-EPI equation.        Eos #   0.0     Eosinophil%   0.1     Glucose   383     Glucose, UA  3+      Gram Stain Result  Few WBC's        Few Gram positive cocci        Few Gram negative rods      Gran # (ANC)   16.3     Gran%   89.8     Hematocrit   40.6     Hemoglobin   13.8     Ketones, UA  1+      Lactate, Osiel 1.6  Comment:  Falsely low lactic acid results can be found in samples   containing >=13.0 mg/dL total bilirubin and/or >=3.5 mg/dL   direct bilirubin.         Leukocytes, UA  Trace      Lymph #   0.5     Lymph%   2.9     Magnesium   1.5     MCH   27.3     MCHC   34.0     MCV   80     Microscopic Comment  SEE COMMENT  Comment:  Other formed elements not mentioned in the report are not   present in the microscopic examination.         Mono #   1.2     Mono%   6.3     MPV   11.8     Nitrite, UA  Negative      nRBC   0     Occult Blood UA  1+      pH, UA  5.0      Platelets   180     Potassium   3.6     Total Protein   7.8     Protein, UA  Negative  Comment:  Recommend a 24 hour urine protein or a urine   protein/creatinine ratio if globulin induced proteinuria is  clinically suspected.        RBC   5.06     RBC, UA  2      RDW   12.2     Sodium   131     Specific Gravity, UA  1.030      Specimen UA  Urine, Clean Catch      Squam Epithel, UA  4       Urobilinogen, UA  Negative      WBC, UA  45      WBC   18.15     Yeast, UA  None          All pertinent labs within the past 24 hours have been reviewed.    Significant Imaging: I have reviewed and interpreted all pertinent imaging results/findings within the past 24 hours.

## 2018-09-03 NOTE — PROGRESS NOTES
[facility] Hospital Medicine  Progress Note    Patient Name: Mariah Foley  MRN: [id number]  Patient Class: IP- Inpatient   Admission Date: 2018  Length of Stay: 1 days  Attending Physician: Joe Helton MD  Primary Care Provider: Lukas Stein MD    [facility] Medicine Team: [facility] [place], [place] HOSP MED 5 Chiquita Mak MD    Subjective:     Principal Problem:Chronic osteomyelitis of right foot with draining sinus    HPI:  46 yo F  with pmhx of fibromyalgia and DM2, in her usual state of health until 5 days prior to admission when she developed a sore throat with productive cough, which resolved after 2 days. Patient then developed myalgias, mainly around her left scapula and left CVA which she describes as a muscle stretching pain, 7 of 10 intensity, constant, associated with subjective fever, chills, generalized body aches, headache, sharp right ear paip, nausea, vomiting (once daily for past 4 days in the morning, nonbloody). No alleviating or aggravating factors. Patient tried ibuprofen for the pain at home which did not help. No chest or abdomian pain, diarrhea, constipation, changes in urinary habits, dysuria, hematuria, vaginal discharge or odors. Not sexually active. Has a ulcer on plantar aspect of right great toe which she says has been there for 2 months and has not noticed any pain, warmth, or discharge from it, although on examination there was obvious purulent discharge once dressing was removed. 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 Denies any recent trauma, sick contacts, travel out of the country.     Hospital Course:  9/3- Patient seen today and she is vitally stable except of low BP 97/54. Waiting for the MRI result of the R great toe. Continue on Vanc and Zosyn and f/u with Vanc trough tomorrow AM    Past Medical History:   Diagnosis Date    Diabetes mellitus        Past Surgical History:   Procedure Laterality Date     SECTION,  CLASSIC      TUBAL LIGATION         Review of patient's allergies indicates:   Allergen Reactions    Bactrim [sulfamethoxazole-trimethoprim]        No current facility-administered medications on file prior to encounter.      Current Outpatient Medications on File Prior to Encounter   Medication Sig    calcium carbonate (TUMS) 200 mg calcium (500 mg) chewable tablet Take 1 tablet by mouth daily as needed for Heartburn.    cyclobenzaprine (FLEXERIL) 10 MG tablet Take 10 mg by mouth 3 (three) times daily.    ergocalciferol (VITAMIN D2) 50,000 unit Cap Take 50,000 Units by mouth every 7 days.    gabapentin (NEURONTIN) 800 MG tablet Take 800 mg by mouth 3 (three) times daily.    insulin glargine (BASAGLAR KWIKPEN U-100 INSULIN) 100 unit/mL (3 mL) InPn pen Inject 10 Units into the skin every evening.    mupirocin (BACTROBAN) 2 % ointment Apply topically daily as needed    vancomycin HCl (VANCOMYCIN, BULK,) 900 mcg/mg (not less than) Powd 10 mLs by Misc.(Non-Drug; Combo Route) route daily as needed (right foot).    [DISCONTINUED] gabapentin (GRALISE) 600 mg Tb24 Take 1,800 mg by mouth daily with dinner or evening meal. Take 3 tablets with evening meals     [DISCONTINUED] glimepiride (AMARYL) 4 MG tablet Take 1 tablet (4 mg total) by mouth before breakfast.     Family History     None        Tobacco Use    Smoking status: Current Every Day Smoker     Packs/day: 0.50     Years: 7.00     Pack years: 3.50     Types: Cigarettes    Smokeless tobacco: Never Used   Substance and Sexual Activity    Alcohol use: No    Drug use: No    Sexual activity: Not Currently     Review of Systems   Constitutional: Positive for appetite change, chills, diaphoresis, fatigue and fever.   HENT: Positive for congestion, ear pain, rhinorrhea and sore throat. Negative for mouth sores, nosebleeds, postnasal drip, sneezing, tinnitus and trouble swallowing.    Eyes: Negative for photophobia, pain, discharge and visual disturbance.    Respiratory: Positive for cough. Negative for choking, chest tightness, shortness of breath, wheezing and stridor.    Cardiovascular: Negative for chest pain, palpitations and leg swelling.   Gastrointestinal: Positive for nausea and vomiting. Negative for abdominal distention, abdominal pain, anal bleeding, blood in stool, constipation and diarrhea.   Endocrine: Negative for polydipsia, polyphagia and polyuria.   Genitourinary: Negative for difficulty urinating, flank pain, hematuria, pelvic pain, vaginal bleeding, vaginal discharge and vaginal pain.   Musculoskeletal: Positive for back pain and myalgias. Negative for arthralgias, joint swelling, neck pain and neck stiffness.   Skin: Positive for wound. Negative for rash.   Allergic/Immunologic: Negative for immunocompromised state.   Neurological: Positive for headaches. Negative for dizziness, tremors, facial asymmetry, weakness, light-headedness and numbness.   Hematological: Negative for adenopathy. Does not bruise/bleed easily.     Objective:     Vital Signs (Most Recent):  Temp: 98.3 °F (36.8 °C) (09/03/18 1218)  Pulse: 94 (09/03/18 1218)  Resp: 18 (09/03/18 1218)  BP: (!) 97/54 (09/03/18 1218)  SpO2: 99 % (09/03/18 1218) Vital Signs (24h Range):  Temp:  [96.9 °F (36.1 °C)-101.6 °F (38.7 °C)] 98.3 °F (36.8 °C)  Pulse:  [] 94  Resp:  [16-20] 18  SpO2:  [95 %-100 %] 99 %  BP: ()/(51-92) 97/54     Weight: 62.6 kg (138 lb)  Body mass index is 24.45 kg/m².    Physical Exam   Constitutional: She is oriented to person, place, and time. She appears well-developed and well-nourished.   Moderate distress, laying on left side,has chills    HENT:   Head: Normocephalic and atraumatic.   Right Ear: External ear normal.   Left Ear: External ear normal.   Mouth/Throat: Oropharynx is clear and moist. No oropharyngeal exudate.   Eyes: Conjunctivae and EOM are normal. Pupils are equal, round, and reactive to light. Right eye exhibits no discharge. Left eye  exhibits no discharge. No scleral icterus.   Neck: Normal range of motion. Neck supple. No thyromegaly present.   Cardiovascular: Normal rate, regular rhythm, normal heart sounds and intact distal pulses. Exam reveals no gallop and no friction rub.   No murmur heard.  Pulmonary/Chest: Effort normal and breath sounds normal. No stridor. No respiratory distress. She has no wheezes. She has no rales. She exhibits no tenderness.   Abdominal: Soft. Bowel sounds are normal. She exhibits no distension and no mass. There is no tenderness. There is no guarding.   Musculoskeletal: Normal range of motion. She exhibits tenderness. She exhibits no edema or deformity.   1 cm ulcer on plantar aspect of right great toe, dark purulent discharge, mild edema, no erythema.   Generalized mild tenderness to palpation of lower back around lumbar region, L > R  No acute CVA tenderness   Lymphadenopathy:     She has no cervical adenopathy.   Neurological: She is alert and oriented to person, place, and time.   Skin: Skin is warm and dry. Capillary refill takes less than 2 seconds. No rash noted. No erythema.         CRANIAL NERVES     CN III, IV, VI   Pupils are equal, round, and reactive to light.  Extraocular motions are normal.        Significant Labs:   Recent Lab Results       09/03/18  1224 09/03/18  1013 09/03/18  1007 09/03/18  0503 09/02/18  2343      Immature Granulocytes    0.5      Immature Grans (Abs)    0.06  Comment:  Mild elevation in immature granulocytes is non specific and   can be seen in a variety of conditions including stress response,   acute inflammation, trauma and pregnancy. Correlation with other   laboratory and clinical findings is essential.        Procalcitonin          Albumin    2.7      Alkaline Phosphatase    103      ALT    15      Anion Gap    7      Appearance, UA          AST    17      Bacteria, UA          Baso #    0.02      Basophil%    0.2      Beta-Hydroxybutyrate  0.4        Bilirubin (UA)           Total Bilirubin    0.5  Comment:  For infants and newborns, interpretation of results should be based  on gestational age, weight and in agreement with clinical  observations.  Premature Infant recommended reference ranges:  Up to 24 hours.............<8.0 mg/dL  Up to 48 hours............<12.0 mg/dL  3-5 days..................<15.0 mg/dL  6-29 days.................<15.0 mg/dL        Blood Culture, Routine          BUN, Bld    15      Calcium    8.5      Chloride    98      CO2    25      Color, UA          Creatinine    0.9      CRP    178.9      Differential Method    Automated      eGFR if     >60.0      eGFR if non     >60.0  Comment:  Calculation used to obtain the estimated glomerular filtration  rate (eGFR) is the CKD-EPI equation.         Eos #    0.0      Eosinophil%    0.0      Estimated Avg Glucose    306      Flu A & B Source     Nasopharyngeal Swab     Glucose    344      Glucose, UA          Gram Stain Result          Gran # (ANC)    9.4      Gran%    86.4      Hematocrit    34.2      Hemoglobin    11.5      Hemoglobin A1C    12.3  Comment:  ADA Screening Guidelines:  5.7-6.4%  Consistent with prediabetes  >or=6.5%  Consistent with diabetes  High levels of fetal hemoglobin interfere with the HbA1C  assay. Heterozygous hemoglobin variants (HbS, HgC, etc)do  not significantly interfere with this assay.   However, presence of multiple variants may affect accuracy.        HIV 1/2 Ag/Ab          Influenza A Ag, EIA     Negative     Influenza B Ag, EIA     Negative     Ketones, UA          Lactate, Osiel          Leukocytes, UA          Lymph #    0.6      Lymph%    5.0      Magnesium    2.5      MCH    27.2      MCHC    33.6      MCV    81      Microscopic Comment          Mono #    0.9      Mono%    7.9      Monospot          MPV    11.6      Nitrite, UA          nRBC    0      Occult Blood UA          pH, UA          Phosphorus    3.6      Platelets    143      POCT  Glucose 321  293       Potassium    4.0      Total Protein    6.2      Protein, UA          Rapid Strep A Screen     Negative  Comment:  See Micro for reflexed Strep culture.     RBC    4.23      RBC, UA          RDW    12.5      Sed Rate    65  Comment:  Manual ESR performed at [facility] [place]:  Normal range:  Male   0-10 mm/Hr  Female 0-20 mm/Hr  [C]      Sodium    130      Specific [place], UA          Specimen UA          Squam Epithel, UA          Urobilinogen, UA          WBC, UA          WBC    10.93      Yeast, UA                      09/02/18  2339 09/02/18  2231 09/02/18  1921 09/02/18  1920 09/02/18  1811      Immature Granulocytes          Immature Grans (Abs)          Procalcitonin          Albumin          Alkaline Phosphatase          ALT          Anion Gap          Appearance, UA     Hazy     AST          Bacteria, UA     Rare     Baso #          Basophil%          Beta-Hydroxybutyrate          Bilirubin (UA)     Negative     Total Bilirubin          Blood Culture, Routine   No Growth to date[P]          No Growth to date[P]       BUN, Bld          Calcium          Chloride          CO2          Color, UA     Yellow     Creatinine          CRP          Differential Method          eGFR if           eGFR if non           Eos #          Eosinophil%          Estimated Avg Glucose          Flu A & B Source          Glucose          Glucose, UA     3+     Gram Stain Result     Few WBC's          Few Gram positive cocci          Few Gram negative rods     Gran # (ANC)          Gran%          Hematocrit          Hemoglobin          Hemoglobin A1C          HIV 1/2 Ag/Ab  Negative        Influenza A Ag, EIA          Influenza B Ag, EIA          Ketones, UA     1+     Lactate, Osiel    1.6  Comment:  Falsely low lactic acid results can be found in samples   containing >=13.0 mg/dL total bilirubin and/or >=3.5 mg/dL   direct bilirubin.        Leukocytes, UA     Trace     Lymph #           Lymph%          Magnesium          MCH          MCHC          MCV          Microscopic Comment     SEE COMMENT  Comment:  Other formed elements not mentioned in the report are not   present in the microscopic examination.        Mono #          Mono%          Monospot  Negative        MPV          Nitrite, UA     Negative     nRBC          Occult Blood UA     1+     pH, UA     5.0     Phosphorus          Platelets          POCT Glucose 286         Potassium          Total Protein          Protein, UA     Negative  Comment:  Recommend a 24 hour urine protein or a urine   protein/creatinine ratio if globulin induced proteinuria is  clinically suspected.       Rapid Strep A Screen          RBC          RBC, UA     2     RDW          Sed Rate          Sodium          Specific [place], UA     1.030     Specimen UA     Urine, Clean Catch     Squam Epithel, UA     4     Urobilinogen, UA     Negative     WBC, UA     45     WBC          Yeast, UA     None                 09/02/18  1810      Immature Granulocytes 0.6     Immature Grans (Abs) 0.10  Comment:  Mild elevation in immature granulocytes is non specific and   can be seen in a variety of conditions including stress response,   acute inflammation, trauma and pregnancy. Correlation with other   laboratory and clinical findings is essential.       Procalcitonin 11.01  Comment:  Please re-baseline procalcitonin if a patient is transferred from  other facilities to [facility].  A concentration < 0.25 ng/mL represents a low risk bacterial   infection.  Procalcitonin may not be accurate among patients with localized   infection, recent trauma or major surgery, immunosuppressed state,   invasive fungal infection, renal dysfunction. Decisions regarding   initiation or continuation of antibiotic therapy should not be based   solely on procalcitonin levels.       Albumin 3.4     Alkaline Phosphatase 145     ALT 14     Anion Gap 14     Appearance, UA      AST 14      Bacteria, UA      Baso # 0.05     Basophil% 0.3     Beta-Hydroxybutyrate      Bilirubin (UA)      Total Bilirubin 0.6  Comment:  For infants and newborns, interpretation of results should be based  on gestational age, weight and in agreement with clinical  observations.  Premature Infant recommended reference ranges:  Up to 24 hours.............<8.0 mg/dL  Up to 48 hours............<12.0 mg/dL  3-5 days..................<15.0 mg/dL  6-29 days.................<15.0 mg/dL       Blood Culture, Routine      BUN, Bld 16     Calcium 9.6     Chloride 94     CO2 23     Color, UA      Creatinine 0.9     CRP      Differential Method Automated     eGFR if African American >60.0     eGFR if non  >60.0  Comment:  Calculation used to obtain the estimated glomerular filtration  rate (eGFR) is the CKD-EPI equation.        Eos # 0.0     Eosinophil% 0.1     Estimated Avg Glucose      Flu A & B Source      Glucose 383     Glucose, UA      Gram Stain Result      Gran # (ANC) 16.3     Gran% 89.8     Hematocrit 40.6     Hemoglobin 13.8     Hemoglobin A1C      HIV 1/2 Ag/Ab      Influenza A Ag, EIA      Influenza B Ag, EIA      Ketones, UA      Lactate, Osiel      Leukocytes, UA      Lymph # 0.5     Lymph% 2.9     Magnesium 1.5     MCH 27.3     MCHC 34.0     MCV 80     Microscopic Comment      Mono # 1.2     Mono% 6.3     Monospot      MPV 11.8     Nitrite, UA      nRBC 0     Occult Blood UA      pH, UA      Phosphorus      Platelets 180     POCT Glucose      Potassium 3.6     Total Protein 7.8     Protein, UA      Rapid Strep A Screen      RBC 5.06     RBC, UA      RDW 12.2     Sed Rate      Sodium 131     Specific [place], UA      Specimen UA      Squam Epithel, UA      Urobilinogen, UA      WBC, UA      WBC 18.15     Yeast, UA          All pertinent labs within the past 24 hours have been reviewed.    Significant Imaging: I have reviewed and interpreted all pertinent imaging results/findings within the past 24  hours.    Assessment/Plan:      Foot ulcer due to secondary DM              Debility    -PT and OT was ordered to treat and evaluate.  -f/u their recs.        Cough    -CXR unremarkable  -sputum culture if productive cough continues   -will consider coverage for atypical if pt not clinically improving with vanc/zosyn           Fibromyalgia    -currently on Duloxetine 30 mg once daily.  -resume gabapentin            DM2 (diabetes mellitus, type 2)    -takes insulin at home, patient think it's 10 units at night. Also on glimepiride BID   -currently she is on detemir 10U BID and insulin sliding scale (aspart 8U, QID (AC+HS)  -continue f/u blood glucose.        Tobacco abuse    - for tobacco cessation           UTI (urinary tract infection)    - rocephin given in ED for possible UTI           Leukocytosis      -see plan for sepsis         Sepsis    - 3/4 SIRS  - suspect source is infected right great toe ulcer  - purulent discharge from ulcer swabbed and sent for culture  -podiatry consulted  -MRI w/contrast ordered to r/o osteomyelitis   -started on vanc and zosyn IV   -received one dose of rocephin in ED   -given hx of sore throat, cough, myalgia - rapid strep, influenza, HIV, and monospot testing sent.   -f/u blood cultures   - 2L NS bolus in ED (~30cc/kg)   - tylenol PRN for mild pain (1-3) and fever, tramadol PRN for moderate (4-7)             Diabetic ulcer of toe of right foot    -podiatry consulted  -f/u with MRI result of the R great toe.          VTE Risk Mitigation (From admission, onward)        Ordered     enoxaparin injection 40 mg  Daily      09/03/18 0722     IP VTE LOW RISK PATIENT  Once      09/02/18 2155     Place sequential compression device  Until discontinued      09/02/18 2155     IP VTE LOW RISK PATIENT  Once      09/02/18 2155              Chiquita Mak MD  Department of Hospital Medicine   [facility]

## 2018-09-03 NOTE — SUBJECTIVE & OBJECTIVE
Scheduled Meds:   DULoxetine  30 mg Oral Daily    enoxaparin  40 mg Subcutaneous Daily    gabapentin  300 mg Oral TID    insulin detemir U-100  10 Units Subcutaneous BID    nicotine  1 patch Transdermal Daily    piperacillin-tazobactam (ZOSYN) IVPB  4.5 g Intravenous Q8H    vancomycin (VANCOCIN) IVPB  15 mg/kg Intravenous Q24H     Continuous Infusions:  PRN Meds:acetaminophen, dextrose 50%, dextrose 50%, glucagon (human recombinant), glucose, glucose, HYDROcodone-acetaminophen, insulin aspart U-100, ketorolac, ondansetron, sodium chloride 0.9%    Review of patient's allergies indicates:   Allergen Reactions    Bactrim [sulfamethoxazole-trimethoprim]         Past Medical History:   Diagnosis Date    Diabetes mellitus      Past Surgical History:   Procedure Laterality Date     SECTION, CLASSIC      TUBAL LIGATION         Family History     None        Tobacco Use    Smoking status: Current Every Day Smoker     Packs/day: 0.50     Years: 7.00     Pack years: 3.50     Types: Cigarettes    Smokeless tobacco: Never Used   Substance and Sexual Activity    Alcohol use: No    Drug use: No    Sexual activity: Not Currently     Review of Systems   Constitutional: Negative for chills and fever.   Cardiovascular: Negative for leg swelling.   Gastrointestinal: Negative for nausea and vomiting.   Skin: Positive for wound.     Objective:     Vital Signs (Most Recent):  Temp: 98.3 °F (36.8 °C) (18 1218)  Pulse: 94 (18 1218)  Resp: 18 (18 1218)  BP: (!) 97/54 (18 1218)  SpO2: 99 % (18 1218) Vital Signs (24h Range):  Temp:  [96.9 °F (36.1 °C)-101.6 °F (38.7 °C)] 98.3 °F (36.8 °C)  Pulse:  [] 94  Resp:  [16-20] 18  SpO2:  [95 %-100 %] 99 %  BP: ()/(51-92) 97/54     Weight: 62.6 kg (138 lb)  Body mass index is 24.45 kg/m².    Foot Exam    General  General Appearance: appears stated age and healthy   Orientation: alert and oriented to person, place, and time       Right  Foot/Ankle     Inspection and Palpation  Skin Exam: drainage and ulcer; skin not intact and no erythema     Neurovascular  Dorsalis pedis: 2+  Posterior tibial: 2+  Saphenous nerve sensation: normal  Tibial nerve sensation: normal  Superficial peroneal nerve sensation: normal  Deep peroneal nerve sensation: normal  Sural nerve sensation: normal    Comments  Planter wound to the R great toe.  - no edema, erythema, drainage or SOI to the wound.  - Wound base is fibrogranular and skin margins are hyperkeratotic.  - Bone probes deep but does not appear to probe to bone.  - No tracking noted.    Left Foot/Ankle      Inspection and Palpation  Skin Exam: skin intact;     Neurovascular  Dorsalis pedis: 2+  Posterior tibial: 2+  Saphenous nerve sensation: normal  Tibial nerve sensation: normal  Superficial peroneal nerve sensation: normal  Deep peroneal nerve sensation: normal  Sural nerve sensation: normal            Laboratory:  BMP:   Recent Labs   Lab  09/03/18   0503   GLU  344*   NA  130*   K  4.0   CL  98   CO2  25   BUN  15   CREATININE  0.9   CALCIUM  8.5*   MG  2.5     CBC:   Recent Labs   Lab  09/03/18   0503   WBC  10.93   RBC  4.23   HGB  11.5*   HCT  34.2*   PLT  143*   MCV  81*   MCH  27.2   MCHC  33.6     Wound Cultures: No results for input(s): LABAERO in the last 4320 hours.  Microbiology Results (last 7 days)     Procedure Component Value Units Date/Time    Aerobic culture [[id number]]     Order Status:  Canceled Specimen:  Wound from Toe, Right Foot     Culture, Anaerobe [[id number]]     Order Status:  Canceled Specimen:  Wound from Toe, Right Foot     Blood culture #1 **CANNOT BE ORDERED STAT** [[id number]] Collected:  09/02/18 1921    Order Status:  Completed Specimen:  Blood from Peripheral, Antecubital, Left Updated:  09/03/18 0315     Blood Culture, Routine No Growth to date    Blood culture #2 **CANNOT BE ORDERED STAT** [[id number]] Collected:  09/02/18 1921    Order Status:  Completed Specimen:  Blood  from Peripheral, Antecubital, Left Updated:  09/03/18 0315     Blood Culture, Routine No Growth to date    Culture, Anaerobic [[id number]] Collected:  09/02/18 2344    Order Status:  No result Specimen:  Wound Updated:  09/03/18 0100    Culture, Anaerobe [[id number]]     Order Status:  Completed Specimen:  Wound from Toe, Right Foot     Strep A culture, throat [[id number]] Collected:  09/02/18 2343    Order Status:  No result Specimen:  Throat Updated:  09/03/18 0015    Throat Screen, Rapid [[id number]] Collected:  09/02/18 2343    Order Status:  Completed Specimen:  Throat Updated:  09/03/18 0015     Rapid Strep A Screen Negative     Comment: See Micro for reflexed Strep culture.       Aerobic culture [[id number]] Collected:  09/02/18 2344    Order Status:  Sent Specimen:  Wound from Toe, Right Foot Updated:  09/02/18 2348    Culture, Respiratory with Gram Stain [[id number]]     Order Status:  No result Specimen:  Respiratory     Culture, Body Fluid - Bactec [[id number]]     Order Status:  Canceled Specimen:  Body Fluid from Toe, Right Foot     Gram stain [[id number]] Collected:  09/02/18 1811    Order Status:  Completed Specimen:  Urine from Clean Catch Updated:  09/02/18 2049     Gram Stain Result Few WBC's      Few Gram positive cocci      Few Gram negative rods    Narrative:       add on test urine gram stain per dr celestine rodriguez order #[id number]   09/02/2018  20:21     Gram stain [[id number]]     Order Status:  Completed Specimen:  Urine, Clean Catch     Urine culture [[id number]] Collected:  09/02/18 1811    Order Status:  No result Specimen:  Urine Updated:  09/02/18 1852          Diagnostic Results:  MRI: I have reviewed all pertinent results/findings within the past 24 hours.  No signs of acute osteomyelitis or abscess    Clinical Findings:

## 2018-09-03 NOTE — ASSESSMENT & PLAN NOTE
-Right plantar 1st toe wound is stabled. No erythema, edema, drainage, SOI.  -MRI reviewed: No signs of acute osteomyelitis or abscess  -Wound would benefit from a bedside debridement of the hyperkeratotic tissue. Will plan for tomorrow.  -Wound was painted with betadine and DSD today.  -Patient to WBAT in a darco shoe.  -Podiatry will follow.

## 2018-09-03 NOTE — SUBJECTIVE & OBJECTIVE
Past Medical History:   Diagnosis Date    Diabetes mellitus        Past Surgical History:   Procedure Laterality Date     SECTION, CLASSIC      TUBAL LIGATION         Review of patient's allergies indicates:   Allergen Reactions    Bactrim [sulfamethoxazole-trimethoprim]        No current facility-administered medications on file prior to encounter.      Current Outpatient Medications on File Prior to Encounter   Medication Sig    calcium carbonate (TUMS) 200 mg calcium (500 mg) chewable tablet Take 1 tablet by mouth daily as needed for Heartburn.    cyclobenzaprine (FLEXERIL) 10 MG tablet Take 10 mg by mouth 3 (three) times daily.    ergocalciferol (VITAMIN D2) 50,000 unit Cap Take 50,000 Units by mouth every 7 days.    gabapentin (NEURONTIN) 800 MG tablet Take 800 mg by mouth 3 (three) times daily.    insulin glargine (BASAGLAR KWIKPEN U-100 INSULIN) 100 unit/mL (3 mL) InPn pen Inject 10 Units into the skin every evening.    mupirocin (BACTROBAN) 2 % ointment Apply topically daily as needed    vancomycin HCl (VANCOMYCIN, BULK,) 900 mcg/mg (not less than) Powd 10 mLs by Misc.(Non-Drug; Combo Route) route daily as needed (right foot).    [DISCONTINUED] gabapentin (GRALISE) 600 mg Tb24 Take 1,800 mg by mouth daily with dinner or evening meal. Take 3 tablets with evening meals     [DISCONTINUED] glimepiride (AMARYL) 4 MG tablet Take 1 tablet (4 mg total) by mouth before breakfast.     Family History     None        Tobacco Use    Smoking status: Current Every Day Smoker     Packs/day: 0.50     Years: 7.00     Pack years: 3.50     Types: Cigarettes    Smokeless tobacco: Never Used   Substance and Sexual Activity    Alcohol use: No    Drug use: No    Sexual activity: Not Currently     Review of Systems   Constitutional: Positive for appetite change, chills, diaphoresis, fatigue and fever.   HENT: Positive for congestion, ear pain, rhinorrhea and sore throat. Negative for mouth sores,  nosebleeds, postnasal drip, sneezing, tinnitus and trouble swallowing.    Eyes: Negative for photophobia, pain, discharge and visual disturbance.   Respiratory: Positive for cough. Negative for choking, chest tightness, shortness of breath, wheezing and stridor.    Cardiovascular: Negative for chest pain, palpitations and leg swelling.   Gastrointestinal: Positive for nausea and vomiting. Negative for abdominal distention, abdominal pain, anal bleeding, blood in stool, constipation and diarrhea.   Endocrine: Negative for polydipsia, polyphagia and polyuria.   Genitourinary: Negative for difficulty urinating, flank pain, hematuria, pelvic pain, vaginal bleeding, vaginal discharge and vaginal pain.   Musculoskeletal: Positive for back pain and myalgias. Negative for arthralgias, joint swelling, neck pain and neck stiffness.   Skin: Positive for wound. Negative for rash.   Allergic/Immunologic: Negative for immunocompromised state.   Neurological: Positive for headaches. Negative for dizziness, tremors, facial asymmetry, weakness, light-headedness and numbness.   Hematological: Negative for adenopathy. Does not bruise/bleed easily.     Objective:     Vital Signs (Most Recent):  Temp: 98.3 °F (36.8 °C) (09/03/18 1218)  Pulse: 94 (09/03/18 1218)  Resp: 18 (09/03/18 1218)  BP: (!) 97/54 (09/03/18 1218)  SpO2: 99 % (09/03/18 1218) Vital Signs (24h Range):  Temp:  [96.9 °F (36.1 °C)-101.6 °F (38.7 °C)] 98.3 °F (36.8 °C)  Pulse:  [] 94  Resp:  [16-20] 18  SpO2:  [95 %-100 %] 99 %  BP: ()/(51-92) 97/54     Weight: 62.6 kg (138 lb)  Body mass index is 24.45 kg/m².    Physical Exam   Constitutional: She is oriented to person, place, and time. She appears well-developed and well-nourished.   Moderate distress, laying on left side,has chills    HENT:   Head: Normocephalic and atraumatic.   Right Ear: External ear normal.   Left Ear: External ear normal.   Mouth/Throat: Oropharynx is clear and moist. No oropharyngeal  exudate.   Eyes: Conjunctivae and EOM are normal. Pupils are equal, round, and reactive to light. Right eye exhibits no discharge. Left eye exhibits no discharge. No scleral icterus.   Neck: Normal range of motion. Neck supple. No thyromegaly present.   Cardiovascular: Normal rate, regular rhythm, normal heart sounds and intact distal pulses. Exam reveals no gallop and no friction rub.   No murmur heard.  Pulmonary/Chest: Effort normal and breath sounds normal. No stridor. No respiratory distress. She has no wheezes. She has no rales. She exhibits no tenderness.   Abdominal: Soft. Bowel sounds are normal. She exhibits no distension and no mass. There is no tenderness. There is no guarding.   Musculoskeletal: Normal range of motion. She exhibits tenderness. She exhibits no edema or deformity.   1 cm ulcer on plantar aspect of right great toe, dark purulent discharge, mild edema, no erythema.   Generalized mild tenderness to palpation of lower back around lumbar region, L > R  No acute CVA tenderness   Lymphadenopathy:     She has no cervical adenopathy.   Neurological: She is alert and oriented to person, place, and time.   Skin: Skin is warm and dry. Capillary refill takes less than 2 seconds. No rash noted. No erythema.         CRANIAL NERVES     CN III, IV, VI   Pupils are equal, round, and reactive to light.  Extraocular motions are normal.        Significant Labs:   Recent Lab Results       09/03/18  1224 09/03/18  1013 09/03/18  1007 09/03/18  0503 09/02/18  2343      Immature Granulocytes    0.5      Immature Grans (Abs)    0.06  Comment:  Mild elevation in immature granulocytes is non specific and   can be seen in a variety of conditions including stress response,   acute inflammation, trauma and pregnancy. Correlation with other   laboratory and clinical findings is essential.        Procalcitonin          Albumin    2.7      Alkaline Phosphatase    103      ALT    15      Anion Gap    7      Appearance, UA           AST    17      Bacteria, UA          Baso #    0.02      Basophil%    0.2      Beta-Hydroxybutyrate  0.4        Bilirubin (UA)          Total Bilirubin    0.5  Comment:  For infants and newborns, interpretation of results should be based  on gestational age, weight and in agreement with clinical  observations.  Premature Infant recommended reference ranges:  Up to 24 hours.............<8.0 mg/dL  Up to 48 hours............<12.0 mg/dL  3-5 days..................<15.0 mg/dL  6-29 days.................<15.0 mg/dL        Blood Culture, Routine          BUN, Bld    15      Calcium    8.5      Chloride    98      CO2    25      Color, UA          Creatinine    0.9      CRP    178.9      Differential Method    Automated      eGFR if     >60.0      eGFR if non     >60.0  Comment:  Calculation used to obtain the estimated glomerular filtration  rate (eGFR) is the CKD-EPI equation.         Eos #    0.0      Eosinophil%    0.0      Estimated Avg Glucose    306      Flu A & B Source     Nasopharyngeal Swab     Glucose    344      Glucose, UA          Gram Stain Result          Gran # (ANC)    9.4      Gran%    86.4      Hematocrit    34.2      Hemoglobin    11.5      Hemoglobin A1C    12.3  Comment:  ADA Screening Guidelines:  5.7-6.4%  Consistent with prediabetes  >or=6.5%  Consistent with diabetes  High levels of fetal hemoglobin interfere with the HbA1C  assay. Heterozygous hemoglobin variants (HbS, HgC, etc)do  not significantly interfere with this assay.   However, presence of multiple variants may affect accuracy.        HIV 1/2 Ag/Ab          Influenza A Ag, EIA     Negative     Influenza B Ag, EIA     Negative     Ketones, UA          Lactate, Osiel          Leukocytes, UA          Lymph #    0.6      Lymph%    5.0      Magnesium    2.5      MCH    27.2      MCHC    33.6      MCV    81      Microscopic Comment          Mono #    0.9      Mono%    7.9      Monospot          MPV     11.6      Nitrite, UA          nRBC    0      Occult Blood UA          pH, UA          Phosphorus    3.6      Platelets    143      POCT Glucose 321  293       Potassium    4.0      Total Protein    6.2      Protein, UA          Rapid Strep A Screen     Negative  Comment:  See Micro for reflexed Strep culture.     RBC    4.23      RBC, UA          RDW    12.5      Sed Rate    65  Comment:  Manual ESR performed at [facility]:  Normal range:  Male   0-10 mm/Hr  Female 0-20 mm/Hr  [C]      Sodium    130      Specific [place], UA          Specimen UA          Squam Epithel, UA          Urobilinogen, UA          WBC, UA          WBC    10.93      Yeast, UA                      09/02/18  2339 09/02/18  2231 09/02/18  1921 09/02/18  1920 09/02/18  1811      Immature Granulocytes          Immature Grans (Abs)          Procalcitonin          Albumin          Alkaline Phosphatase          ALT          Anion Gap          Appearance, UA     Hazy     AST          Bacteria, UA     Rare     Baso #          Basophil%          Beta-Hydroxybutyrate          Bilirubin (UA)     Negative     Total Bilirubin          Blood Culture, Routine   No Growth to date[P]          No Growth to date[P]       BUN, Bld          Calcium          Chloride          CO2          Color, UA     Yellow     Creatinine          CRP          Differential Method          eGFR if           eGFR if non           Eos #          Eosinophil%          Estimated Avg Glucose          Flu A & B Source          Glucose          Glucose, UA     3+     Gram Stain Result     Few WBC's          Few Gram positive cocci          Few Gram negative rods     Gran # (ANC)          Gran%          Hematocrit          Hemoglobin          Hemoglobin A1C          HIV 1/2 Ag/Ab  Negative        Influenza A Ag, EIA          Influenza B Ag, EIA          Ketones, UA     1+     Lactate, Osiel    1.6  Comment:  Falsely low lactic acid results can be found  in samples   containing >=13.0 mg/dL total bilirubin and/or >=3.5 mg/dL   direct bilirubin.        Leukocytes, UA     Trace     Lymph #          Lymph%          Magnesium          MCH          MCHC          MCV          Microscopic Comment     SEE COMMENT  Comment:  Other formed elements not mentioned in the report are not   present in the microscopic examination.        Mono #          Mono%          Monospot  Negative        MPV          Nitrite, UA     Negative     nRBC          Occult Blood UA     1+     pH, UA     5.0     Phosphorus          Platelets          POCT Glucose 286         Potassium          Total Protein          Protein, UA     Negative  Comment:  Recommend a 24 hour urine protein or a urine   protein/creatinine ratio if globulin induced proteinuria is  clinically suspected.       Rapid Strep A Screen          RBC          RBC, UA     2     RDW          Sed Rate          Sodium          Specific [place], UA     1.030     Specimen UA     Urine, Clean Catch     Squam Epithel, UA     4     Urobilinogen, UA     Negative     WBC, UA     45     WBC          Yeast, UA     None                 09/02/18  1810      Immature Granulocytes 0.6     Immature Grans (Abs) 0.10  Comment:  Mild elevation in immature granulocytes is non specific and   can be seen in a variety of conditions including stress response,   acute inflammation, trauma and pregnancy. Correlation with other   laboratory and clinical findings is essential.       Procalcitonin 11.01  Comment:  Please re-baseline procalcitonin if a patient is transferred from  other facilities to [facility].  A concentration < 0.25 ng/mL represents a low risk bacterial   infection.  Procalcitonin may not be accurate among patients with localized   infection, recent trauma or major surgery, immunosuppressed state,   invasive fungal infection, renal dysfunction. Decisions regarding   initiation or continuation of antibiotic therapy should not be based    solely on procalcitonin levels.       Albumin 3.4     Alkaline Phosphatase 145     ALT 14     Anion Gap 14     Appearance, UA      AST 14     Bacteria, UA      Baso # 0.05     Basophil% 0.3     Beta-Hydroxybutyrate      Bilirubin (UA)      Total Bilirubin 0.6  Comment:  For infants and newborns, interpretation of results should be based  on gestational age, weight and in agreement with clinical  observations.  Premature Infant recommended reference ranges:  Up to 24 hours.............<8.0 mg/dL  Up to 48 hours............<12.0 mg/dL  3-5 days..................<15.0 mg/dL  6-29 days.................<15.0 mg/dL       Blood Culture, Routine      BUN, Bld 16     Calcium 9.6     Chloride 94     CO2 23     Color, UA      Creatinine 0.9     CRP      Differential Method Automated     eGFR if African American >60.0     eGFR if non  >60.0  Comment:  Calculation used to obtain the estimated glomerular filtration  rate (eGFR) is the CKD-EPI equation.        Eos # 0.0     Eosinophil% 0.1     Estimated Avg Glucose      Flu A & B Source      Glucose 383     Glucose, UA      Gram Stain Result      Gran # (ANC) 16.3     Gran% 89.8     Hematocrit 40.6     Hemoglobin 13.8     Hemoglobin A1C      HIV 1/2 Ag/Ab      Influenza A Ag, EIA      Influenza B Ag, EIA      Ketones, UA      Lactate, Osiel      Leukocytes, UA      Lymph # 0.5     Lymph% 2.9     Magnesium 1.5     MCH 27.3     MCHC 34.0     MCV 80     Microscopic Comment      Mono # 1.2     Mono% 6.3     Monospot      MPV 11.8     Nitrite, UA      nRBC 0     Occult Blood UA      pH, UA      Phosphorus      Platelets 180     POCT Glucose      Potassium 3.6     Total Protein 7.8     Protein, UA      Rapid Strep A Screen      RBC 5.06     RBC, UA      RDW 12.2     Sed Rate      Sodium 131     Specific Gravity, UA      Specimen UA      Squam Epithel, UA      Urobilinogen, UA      WBC, UA      WBC 18.15     Yeast, UA          All pertinent labs within the past 24 hours  have been reviewed.    Significant Imaging: I have reviewed and interpreted all pertinent imaging results/findings within the past 24 hours.

## 2018-09-03 NOTE — PHARMACY MED REC
"Admission Medication Reconciliation - Pharmacy Consult Note    The home medication history was taken by Kelsi Mustafa, Pharmacy Technician.  Based on information gathered and subsequent review by the clinical pharmacist, the items below may need attention.     You may go to "Admission" then "Reconcile Home Medications" tabs to review and/or act upon these items.     Potentially problematic discrepancies with current MAR  o Patient IS taking the following which was not ordered upon admit  o Cyclobenzaprine 10 mg oral TID  o Patient is taking a DIFFERENT DRUG than that ordered upon admit  o Patient is taking escitalopram 10 mg oral daily; duloxetine 30 mg oral daily ordered on admission  o Patient is taking a drug DIFFERENTLY than how ordered upon admit  o Patient is taking gabapentin 800 mg oral TID; gabapentin 300 mg oral TID ordered on admission    Potential issues to be addressed PRIOR TO DISCHARGE  o Patient reports non-compliance with home insulin and ergocalciferol    Please address this information as you see fit.  Feel free to contact us if you have any questions or require assistance.    Alexey Weinberg, PharmD  Emergency Medicine Clinical Pharmacist  [phone] (2pm-midnight daily)          .    .            "

## 2018-09-03 NOTE — CONSULTS
Food & Nutrition  Education    Diet Education: Diabetes nutritional Therapy  Time Spent:  Learners: Pt      Nutrition Education provided with handouts: CHO counting for people w/ diabetes/ How to read a nutrition Label. Pt semi-homeless currently living w/ a friend x4 mo. No job/vehicle. Dependent on friend to provide foods.    Comments:  POCT Glucose   Date Value Ref Range Status   09/03/2018 293 (H) 70 - 110 mg/dL Final   09/02/2018 286 (H) 70 - 110 mg/dL Final       Lab Results   Component Value Date    HGBA1C 12.3 (H) 09/03/2018      Dietitian's contact information provided.       Follow-Up: 1x/wk    Please Re-consult as needed      Thanks!  Gwyn Parker RD

## 2018-09-03 NOTE — ASSESSMENT & PLAN NOTE
- 3/4 SIRS  - suspect source is infected right great toe ulcer  - purulent discharge from ulcer swabbed and sent for culture  -podiatry consulted  -MRI w/contrast ordered to r/o osteomyelitis   -started on vanc and zosyn IV   -received one dose of rocephin in ED   -given hx of sore throat, cough, myalgia - rapid strep, influenza, HIV, and monospot testing sent.   -f/u blood cultures   - 2L NS bolus in ED (~30cc/kg)   - tylenol PRN for mild pain (1-3) and fever, tramadol PRN for moderate (4-7)

## 2018-09-03 NOTE — ASSESSMENT & PLAN NOTE
-takes insulin at home, patient think it's 10 units at night. Also on glimepiride BID   -currently she is on detemir 10U BID and insulin sliding scale (aspart 8U, QID (AC+HS)  -continue f/u blood glucose.

## 2018-09-03 NOTE — ASSESSMENT & PLAN NOTE
- 3/4 SIRS  - suspect source is infected right great toe ulcer  - purulent discharge from ulcer swabbed and sent for culture  -podiatry consulted  -MRI w/contrast ordered to r/o osteomyelitis   -started on vanc and zosyn IV   -received rocephin in ED   -given hx of sore throat, cough, myalgia - rapid strep, influenza, HIV, and monospot testing sent.   -f/u blood cultures   - 2L NS bolus in ED (~30cc/kg)   - tylenol PRN for mild pain (1-3) and fever, toradol PRN for moderate (4-7)

## 2018-09-03 NOTE — PLAN OF CARE
Problem: Patient Care Overview  Goal: Plan of Care Review  Outcome: Ongoing (interventions implemented as appropriate)  Plan of care reviewed and updated. Pt AA+O. Pt's pain is managed with the medication ordered at this time. Pt's VS are as charted.  No falls this shift. Pt is oriented to room and call system. Will continue to [facility].

## 2018-09-03 NOTE — CONSULTS
[facility]  Podiatry  Consult Note    Patient Name: Mariah Foley  MRN: [id number]  Admission Date: 2018  Hospital Length of Stay: 1 days  Attending Physician: Joe Helton MD  Primary Care Provider: Lukas Stein MD     Inpatient consult to Podiatry  Consult performed by: Shawn Moe MD  Consult ordered by: Jewel Nguyen MD  Reason for consult: Right great toe wound  Assessment/Recommendations: See bottom of consult note please.        Subjective:     History of Present Illness:  Mariah Foley is a 47 y.o. female who  has a past medical history of Diabetes mellitus.    Patient Admited for pyelonephritis.  Consulted to Podiatry for right great toe wound.  Patient reports having the wound for several months now. She has been on disability since the wound because it is painful. Patient has close follow up with Dr. Vincent Lazo DPM for the wound.    Scheduled Meds:   DULoxetine  30 mg Oral Daily    enoxaparin  40 mg Subcutaneous Daily    gabapentin  300 mg Oral TID    insulin detemir U-100  10 Units Subcutaneous BID    nicotine  1 patch Transdermal Daily    piperacillin-tazobactam (ZOSYN) IVPB  4.5 g Intravenous Q8H    vancomycin (VANCOCIN) IVPB  15 mg/kg Intravenous Q24H     Continuous Infusions:  PRN Meds:acetaminophen, dextrose 50%, dextrose 50%, glucagon (human recombinant), glucose, glucose, HYDROcodone-acetaminophen, insulin aspart U-100, ketorolac, ondansetron, sodium chloride 0.9%    Review of patient's allergies indicates:   Allergen Reactions    Bactrim [sulfamethoxazole-trimethoprim]         Past Medical History:   Diagnosis Date    Diabetes mellitus      Past Surgical History:   Procedure Laterality Date     SECTION, CLASSIC      TUBAL LIGATION         Family History     None        Tobacco Use    Smoking status: Current Every Day Smoker     Packs/day: 0.50     Years: 7.00     Pack years: 3.50     Types: Cigarettes    Smokeless  tobacco: Never Used   Substance and Sexual Activity    Alcohol use: No    Drug use: No    Sexual activity: Not Currently     Review of Systems   Constitutional: Negative for chills and fever.   Cardiovascular: Negative for leg swelling.   Gastrointestinal: Negative for nausea and vomiting.   Skin: Positive for wound.     Objective:     Vital Signs (Most Recent):  Temp: 98.3 °F (36.8 °C) (09/03/18 1218)  Pulse: 94 (09/03/18 1218)  Resp: 18 (09/03/18 1218)  BP: (!) 97/54 (09/03/18 1218)  SpO2: 99 % (09/03/18 1218) Vital Signs (24h Range):  Temp:  [96.9 °F (36.1 °C)-101.6 °F (38.7 °C)] 98.3 °F (36.8 °C)  Pulse:  [] 94  Resp:  [16-20] 18  SpO2:  [95 %-100 %] 99 %  BP: ()/(51-92) 97/54     Weight: 62.6 kg (138 lb)  Body mass index is 24.45 kg/m².    Foot Exam    General  General Appearance: appears stated age and healthy   Orientation: alert and oriented to person, place, and time       Right Foot/Ankle     Inspection and Palpation  Skin Exam: drainage and ulcer; skin not intact and no erythema     Neurovascular  Dorsalis pedis: 2+  Posterior tibial: 2+  Saphenous nerve sensation: normal  Tibial nerve sensation: normal  Superficial peroneal nerve sensation: normal  Deep peroneal nerve sensation: normal  Sural nerve sensation: normal    Comments  Planter wound to the R great toe.  - no edema, erythema, drainage or SOI to the wound.  - Wound base is fibrogranular and skin margins are hyperkeratotic.  - Bone probes deep but does not appear to probe to bone.  - No tracking noted.    Left Foot/Ankle      Inspection and Palpation  Skin Exam: skin intact;     Neurovascular  Dorsalis pedis: 2+  Posterior tibial: 2+  Saphenous nerve sensation: normal  Tibial nerve sensation: normal  Superficial peroneal nerve sensation: normal  Deep peroneal nerve sensation: normal  Sural nerve sensation: normal            Laboratory:  BMP:   Recent Labs   Lab  09/03/18   0503   GLU  344*   NA  130*   K  4.0   CL  98   CO2  25    BUN  15   CREATININE  0.9   CALCIUM  8.5*   MG  2.5     CBC:   Recent Labs   Lab  09/03/18   0503   WBC  10.93   RBC  4.23   HGB  11.5*   HCT  34.2*   PLT  143*   MCV  81*   MCH  27.2   MCHC  33.6     Wound Cultures: No results for input(s): LABAERO in the last 4320 hours.  Microbiology Results (last 7 days)     Procedure Component Value Units Date/Time    Aerobic culture [[id number]]     Order Status:  Canceled Specimen:  Wound from Toe, Right Foot     Culture, Anaerobe [[id number]]     Order Status:  Canceled Specimen:  Wound from Toe, Right Foot     Blood culture #1 **CANNOT BE ORDERED STAT** [[id number]] Collected:  09/02/18 1921    Order Status:  Completed Specimen:  Blood from Peripheral, Antecubital, Left Updated:  09/03/18 0315     Blood Culture, Routine No Growth to date    Blood culture #2 **CANNOT BE ORDERED STAT** [[id number]] Collected:  09/02/18 1921    Order Status:  Completed Specimen:  Blood from Peripheral, Antecubital, Left Updated:  09/03/18 0315     Blood Culture, Routine No Growth to date    Culture, Anaerobic [[id number]] Collected:  09/02/18 2344    Order Status:  No result Specimen:  Wound Updated:  09/03/18 0100    Culture, Anaerobe [[id number]]     Order Status:  Completed Specimen:  Wound from Toe, Right Foot     Strep A culture, throat [[id number]] Collected:  09/02/18 2343    Order Status:  No result Specimen:  Throat Updated:  09/03/18 0015    Throat Screen, Rapid [[id number]] Collected:  09/02/18 2343    Order Status:  Completed Specimen:  Throat Updated:  09/03/18 0015     Rapid Strep A Screen Negative     Comment: See Micro for reflexed Strep culture.       Aerobic culture [[id number]] Collected:  09/02/18 2344    Order Status:  Sent Specimen:  Wound from Toe, Right Foot Updated:  09/02/18 2348    Culture, Respiratory with Gram Stain [[id number]]     Order Status:  No result Specimen:  Respiratory     Culture, Body Fluid - Bactec [[id number]]     Order Status:  Canceled Specimen:  Body Fluid  from Toe, Right Foot     Gram stain [[id number]] Collected:  09/02/18 1811    Order Status:  Completed Specimen:  Urine from Clean Catch Updated:  09/02/18 2049     Gram Stain Result Few WBC's      Few Gram positive cocci      Few Gram negative rods    Narrative:       add on test urine gram stain per dr celestine rodriguez order #[id number]   09/02/2018  20:21     Gram stain [[id number]]     Order Status:  Completed Specimen:  Urine, Clean Catch     Urine culture [[id number]] Collected:  09/02/18 1811    Order Status:  No result Specimen:  Urine Updated:  09/02/18 1852          Diagnostic Results:  MRI: I have reviewed all pertinent results/findings within the past 24 hours.  No signs of acute osteomyelitis or abscess    Clinical Findings:          Assessment/Plan:     DM2 (diabetes mellitus, type 2)    Per primary        Sepsis    Per primary        Diabetic ulcer of toe of right foot    -Right plantar 1st toe wound is stabled. No erythema, edema, drainage, SOI.  -MRI reviewed: No signs of acute osteomyelitis or abscess  -Wound would benefit from a bedside debridement of the hyperkeratotic tissue. Will plan for tomorrow.  -Wound was painted with betadine and DSD today.  -Patient to WBAT in a darco shoe.  -Podiatry will follow.            Thank you for your consult. I will follow-up with patient. Please contact us if you have any additional questions.    Shawn Moe MD  Podiatry  [facility]

## 2018-09-03 NOTE — HOSPITAL COURSE
9/3- Patient seen today and she is vitally stable except of low BP 97/54. Waiting for the MRI result of the R great toe. Continue on Vanc and Zosyn and f/u with Vanc trough tomorrow AM.  9/4- NAEON, patient seen today and she is vitally stable. MRI of R great toe planter ulcer showed no evidence of osteomyelitis or drainable fluid collection. Wound culture showed gram negative rods lactose . Urine culture showed staph aureus. Blood culture showed gram negative cocci. Repeat blood culture and continue on broad spectrum Abx (Vanc and Zosyn). Podiatry is following and debridment was performed today. Patient has productive cough, headache and congestion. Cetirizine and Fluticasone nasal spray was ordered. Insulin dose adjusted (Detemir 10U BID and Aspart 5U TID WM).  9/5 - no acute events. Awaiting further speciation  9/6- NAEON, patient seen today and she is vitally stable. Continue on Vanc and Zosyn and f/u with Vanc trough tomorrow AM. Adjust insulin regimen (detemir 15U BID, aspart 8U TID and continue on low dose ISS), f/u Podiatry recs.  9/7- NAEON, patient seen today and she is vitally stable. Continue on Vanc and Zosyn and f/u with Vanc trough. Adjust insulin regimen to (detemir 20U BID and aspart 10U TID). F/u Podiatry recs.  9/8- NAEON, patient seen today and she is vitally stable. CT maxillofacial and 2d echo was ordered to rule out bacteremia complications. Zosyn was discontinued. Continue on Vanc and Unasyn.  9/9- NAEON, patient is vitally stable. CT maxillofacial showed no drainable periodontal fluid collection. Waiting for 2d echo. Patient has itching, Benadryl was ordered prn, Hydrocodone dose was decreased. Midline placement was ordered.  9/10- NAEON, patient is stable. Patient being prepared for discharge. 2D echo showed no vegetations. Per ID's recommendation patient will be discharged with vanc and unasyn. Consult for PICC line placed as any duration of vanc requires it and unaysn is given  continuously over a 24 hour period. PICC team consulted, told they will place line tomorrow. In addition, patient on 35 units BID of basal insulin, additional 5 units added for better glucose control.    9/11: Started mucinex and tessalon for patient who has complaints of cough today. Continued her home flexeril for back pain. Her glucose is still not well controlled as it is 329 this morning. Increased to 45 units BID of detemir and 20 units aspart. PICC line has been placed today. Patient also started on lisinopril for HTN. Will monitor one more day prior to discharge.   9/12: Mucinex and tessalon helped with cough. She has not requested her flexeril. Glucose not well controlled, increased detemir to 55 BID and aspart to 23 TID. Will get diabetic education prior to discharge. Patient still hypertensive, lisinopril started yesterday. Wound debrided by podiatry today. Aim for discharge tomorrow.

## 2018-09-03 NOTE — HPI
46 yo F  with pmhx of fibromyalgia and DM2, in her usual state of health until 5 days prior to admission when she developed a sore throat with productive cough, which resolved after 2 days. Patient then developed myalgias, mainly around her left scapula and left CVA which she describes as a muscle stretching pain, 7 of 10 intensity, constant, associated with subjective fever, chills, generalized body aches, headache, sharp right ear paip, nausea, vomiting (once daily for past 4 days in the morning, nonbloody). No alleviating or aggravating factors. Patient tried ibuprofen for the pain at home which did not help. No chest or abdomian pain, diarrhea, constipation, changes in urinary habits, dysuria, hematuria, vaginal discharge or odors. Not sexually active. Has a ulcer on plantar aspect of right great toe which she says has been there for 2 months and has not noticed any pain, warmth, or discharge from it, although on examination there was obvious purulent discharge once dressing was removed. 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 Denies any recent trauma, sick contacts, travel out of the country.

## 2018-09-03 NOTE — ASSESSMENT & PLAN NOTE
-CXR unremarkable  -sputum culture if productive cough continues   -will consider coverage for atypical if pt not clinically improving with vanc/zosyn

## 2018-09-04 PROBLEM — L08.9 INFECTED SKIN ULCER WITH FAT LAYER EXPOSED: Status: RESOLVED | Noted: 2018-09-03 | Resolved: 2018-09-04

## 2018-09-04 PROBLEM — L98.492 INFECTED SKIN ULCER WITH FAT LAYER EXPOSED: Status: RESOLVED | Noted: 2018-09-03 | Resolved: 2018-09-04

## 2018-09-04 PROBLEM — E13.621 FOOT ULCER DUE TO SECONDARY DM: Status: RESOLVED | Noted: 2018-09-03 | Resolved: 2018-09-04

## 2018-09-04 PROBLEM — M86.471 CHRONIC OSTEOMYELITIS OF RIGHT FOOT WITH DRAINING SINUS: Status: RESOLVED | Noted: 2018-09-03 | Resolved: 2018-09-04

## 2018-09-04 PROBLEM — L97.509 FOOT ULCER DUE TO SECONDARY DM: Status: RESOLVED | Noted: 2018-09-03 | Resolved: 2018-09-04

## 2018-09-04 LAB
ALBUMIN SERPL BCP-MCNC: 2.6 G/DL
ALP SERPL-CCNC: 147 U/L
ALT SERPL W/O P-5'-P-CCNC: 34 U/L
ANION GAP SERPL CALC-SCNC: 7 MMOL/L
AST SERPL-CCNC: 41 U/L
BACTERIA UR CULT: NORMAL
BASOPHILS # BLD AUTO: 0.02 K/UL
BASOPHILS NFR BLD: 0.3 %
BILIRUB SERPL-MCNC: 0.4 MG/DL
BUN SERPL-MCNC: 15 MG/DL
CALCIUM SERPL-MCNC: 8.3 MG/DL
CHLORIDE SERPL-SCNC: 98 MMOL/L
CO2 SERPL-SCNC: 25 MMOL/L
CREAT SERPL-MCNC: 0.7 MG/DL
DIFFERENTIAL METHOD: ABNORMAL
EOSINOPHIL # BLD AUTO: 0.1 K/UL
EOSINOPHIL NFR BLD: 0.7 %
ERYTHROCYTE [DISTWIDTH] IN BLOOD BY AUTOMATED COUNT: 12.6 %
EST. GFR  (AFRICAN AMERICAN): >60 ML/MIN/1.73 M^2
EST. GFR  (NON AFRICAN AMERICAN): >60 ML/MIN/1.73 M^2
GLUCOSE SERPL-MCNC: 224 MG/DL
HCT VFR BLD AUTO: 33.3 %
HGB BLD-MCNC: 11.1 G/DL
IMM GRANULOCYTES # BLD AUTO: 0.03 K/UL
IMM GRANULOCYTES NFR BLD AUTO: 0.4 %
LYMPHOCYTES # BLD AUTO: 1 K/UL
LYMPHOCYTES NFR BLD: 14.2 %
MAGNESIUM SERPL-MCNC: 2 MG/DL
MCH RBC QN AUTO: 26.7 PG
MCHC RBC AUTO-ENTMCNC: 33.3 G/DL
MCV RBC AUTO: 80 FL
MONOCYTES # BLD AUTO: 0.8 K/UL
MONOCYTES NFR BLD: 12.5 %
NEUTROPHILS # BLD AUTO: 4.8 K/UL
NEUTROPHILS NFR BLD: 71.9 %
NRBC BLD-RTO: 0 /100 WBC
PHOSPHATE SERPL-MCNC: 2.5 MG/DL
PLATELET # BLD AUTO: 135 K/UL
PMV BLD AUTO: 11.8 FL
POCT GLUCOSE: 203 MG/DL (ref 70–110)
POCT GLUCOSE: 247 MG/DL (ref 70–110)
POCT GLUCOSE: 295 MG/DL (ref 70–110)
POTASSIUM SERPL-SCNC: 3.8 MMOL/L
PROT SERPL-MCNC: 6.4 G/DL
RBC # BLD AUTO: 4.16 M/UL
SODIUM SERPL-SCNC: 130 MMOL/L
WBC # BLD AUTO: 6.7 K/UL

## 2018-09-04 PROCEDURE — 99233 SBSQ HOSP IP/OBS HIGH 50: CPT | Mod: ,,, | Performed by: STUDENT IN AN ORGANIZED HEALTH CARE EDUCATION/TRAINING PROGRAM

## 2018-09-04 PROCEDURE — G8987 SELF CARE CURRENT STATUS: HCPCS | Mod: CH

## 2018-09-04 PROCEDURE — 83735 ASSAY OF MAGNESIUM: CPT

## 2018-09-04 PROCEDURE — 97165 OT EVAL LOW COMPLEX 30 MIN: CPT

## 2018-09-04 PROCEDURE — 84100 ASSAY OF PHOSPHORUS: CPT

## 2018-09-04 PROCEDURE — 63600175 PHARM REV CODE 636 W HCPCS: Performed by: STUDENT IN AN ORGANIZED HEALTH CARE EDUCATION/TRAINING PROGRAM

## 2018-09-04 PROCEDURE — 36415 COLL VENOUS BLD VENIPUNCTURE: CPT

## 2018-09-04 PROCEDURE — 0HBMXZZ EXCISION OF RIGHT FOOT SKIN, EXTERNAL APPROACH: ICD-10-PCS | Performed by: PODIATRIST

## 2018-09-04 PROCEDURE — 87040 BLOOD CULTURE FOR BACTERIA: CPT

## 2018-09-04 PROCEDURE — 25000003 PHARM REV CODE 250: Performed by: STUDENT IN AN ORGANIZED HEALTH CARE EDUCATION/TRAINING PROGRAM

## 2018-09-04 PROCEDURE — 11000001 HC ACUTE MED/SURG PRIVATE ROOM

## 2018-09-04 PROCEDURE — 80053 COMPREHEN METABOLIC PANEL: CPT

## 2018-09-04 PROCEDURE — 99233 SBSQ HOSP IP/OBS HIGH 50: CPT | Mod: ,,, | Performed by: PODIATRIST

## 2018-09-04 PROCEDURE — 85025 COMPLETE CBC W/AUTO DIFF WBC: CPT

## 2018-09-04 PROCEDURE — G8989 SELF CARE D/C STATUS: HCPCS | Mod: CH

## 2018-09-04 PROCEDURE — G8988 SELF CARE GOAL STATUS: HCPCS | Mod: CH

## 2018-09-04 RX ORDER — FLUTICASONE PROPIONATE 50 MCG
2 SPRAY, SUSPENSION (ML) NASAL DAILY
Status: DISCONTINUED | OUTPATIENT
Start: 2018-09-04 | End: 2018-09-07

## 2018-09-04 RX ORDER — VANCOMYCIN HCL IN 5 % DEXTROSE 1G/250ML
15 PLASTIC BAG, INJECTION (ML) INTRAVENOUS
Status: DISCONTINUED | OUTPATIENT
Start: 2018-09-04 | End: 2018-09-06

## 2018-09-04 RX ORDER — INSULIN ASPART 100 [IU]/ML
5 INJECTION, SOLUTION INTRAVENOUS; SUBCUTANEOUS
Status: DISCONTINUED | OUTPATIENT
Start: 2018-09-04 | End: 2018-09-06

## 2018-09-04 RX ORDER — GUAIFENESIN 600 MG/1
600 TABLET, EXTENDED RELEASE ORAL 2 TIMES DAILY
Status: DISCONTINUED | OUTPATIENT
Start: 2018-09-04 | End: 2018-09-07

## 2018-09-04 RX ORDER — INSULIN ASPART 100 [IU]/ML
0-5 INJECTION, SOLUTION INTRAVENOUS; SUBCUTANEOUS
Status: DISCONTINUED | OUTPATIENT
Start: 2018-09-04 | End: 2018-09-10

## 2018-09-04 RX ORDER — CETIRIZINE HYDROCHLORIDE 5 MG/1
10 TABLET ORAL DAILY
Status: DISCONTINUED | OUTPATIENT
Start: 2018-09-04 | End: 2018-09-08

## 2018-09-04 RX ADMIN — INSULIN ASPART 5 UNITS: 100 INJECTION, SOLUTION INTRAVENOUS; SUBCUTANEOUS at 04:09

## 2018-09-04 RX ADMIN — HYDROCODONE BITARTRATE AND ACETAMINOPHEN 1 TABLET: 7.5; 325 TABLET ORAL at 03:09

## 2018-09-04 RX ADMIN — INSULIN DETEMIR 10 UNITS: 100 INJECTION, SOLUTION SUBCUTANEOUS at 08:09

## 2018-09-04 RX ADMIN — GUAIFENESIN 600 MG: 600 TABLET, EXTENDED RELEASE ORAL at 04:09

## 2018-09-04 RX ADMIN — GABAPENTIN 300 MG: 300 CAPSULE ORAL at 02:09

## 2018-09-04 RX ADMIN — PIPERACILLIN AND TAZOBACTAM 4.5 G: 4; .5 INJECTION, POWDER, LYOPHILIZED, FOR SOLUTION INTRAVENOUS; PARENTERAL at 02:09

## 2018-09-04 RX ADMIN — INSULIN ASPART 1 UNITS: 100 INJECTION, SOLUTION INTRAVENOUS; SUBCUTANEOUS at 08:09

## 2018-09-04 RX ADMIN — DULOXETINE HYDROCHLORIDE 30 MG: 30 CAPSULE, DELAYED RELEASE ORAL at 08:09

## 2018-09-04 RX ADMIN — GABAPENTIN 300 MG: 300 CAPSULE ORAL at 08:09

## 2018-09-04 RX ADMIN — HYDROCODONE BITARTRATE AND ACETAMINOPHEN 1 TABLET: 7.5; 325 TABLET ORAL at 02:09

## 2018-09-04 RX ADMIN — INSULIN ASPART 4 UNITS: 100 INJECTION, SOLUTION INTRAVENOUS; SUBCUTANEOUS at 08:09

## 2018-09-04 RX ADMIN — KETOROLAC TROMETHAMINE 15 MG: 30 INJECTION, SOLUTION INTRAMUSCULAR at 08:09

## 2018-09-04 RX ADMIN — ENOXAPARIN SODIUM 40 MG: 100 INJECTION SUBCUTANEOUS at 04:09

## 2018-09-04 RX ADMIN — VANCOMYCIN HYDROCHLORIDE 1000 MG: 1 INJECTION, POWDER, LYOPHILIZED, FOR SOLUTION INTRAVENOUS at 02:09

## 2018-09-04 RX ADMIN — HYDROCODONE BITARTRATE AND ACETAMINOPHEN 1 TABLET: 7.5; 325 TABLET ORAL at 08:09

## 2018-09-04 RX ADMIN — PIPERACILLIN AND TAZOBACTAM 4.5 G: 4; .5 INJECTION, POWDER, LYOPHILIZED, FOR SOLUTION INTRAVENOUS; PARENTERAL at 04:09

## 2018-09-04 RX ADMIN — VANCOMYCIN HYDROCHLORIDE 1000 MG: 1 INJECTION, POWDER, LYOPHILIZED, FOR SOLUTION INTRAVENOUS at 12:09

## 2018-09-04 NOTE — ASSESSMENT & PLAN NOTE
- 3/4 SIRS  - suspect source is infected right great toe ulcer  - purulent discharge from ulcer swabbed and sent for culture  -podiatry consulted  -started on vanc and zosyn IV   -received one dose of rocephin in ED   -given hx of sore throat, cough, myalgia - rapid strep, influenza, HIV, and monospot testing sent.   - 2L NS bolus in ED (~30cc/kg)   - tylenol PRN for mild pain (1-3) and fever, tramadol PRN for moderate (4-7)   - blood culture showed gram negative cocci.  - urine culture showed staph aureus.  - wound culture showed gram negative rods lactose .  - continue on Vanc and Zosyn, f/u with repeated blood culture.

## 2018-09-04 NOTE — SUBJECTIVE & OBJECTIVE
Interval Hx:  Patient Seen at bedside for dressing change and bedside debridement.  Patient denies F/C/N/V.  Dressings clean, dry, and intact.      Scheduled Meds:   cetirizine  10 mg Oral Daily    DULoxetine  30 mg Oral Daily    enoxaparin  40 mg Subcutaneous Daily    fluticasone  2 spray Each Nare Daily    gabapentin  300 mg Oral TID    insulin aspart U-100  5 Units Subcutaneous TIDWM    insulin detemir U-100  10 Units Subcutaneous BID    nicotine  1 patch Transdermal Daily    piperacillin-tazobactam (ZOSYN) IVPB  4.5 g Intravenous Q8H    vancomycin (VANCOCIN) IVPB  15 mg/kg Intravenous Q12H     Continuous Infusions:  PRN Meds:acetaminophen, dextrose 50%, dextrose 50%, glucagon (human recombinant), glucose, glucose, HYDROcodone-acetaminophen, insulin aspart U-100, ketorolac, ondansetron, sodium chloride 0.9%    Review of patient's allergies indicates:   Allergen Reactions    Bactrim [sulfamethoxazole-trimethoprim]         Past Medical History:   Diagnosis Date    Diabetes mellitus      Past Surgical History:   Procedure Laterality Date     SECTION, CLASSIC      TUBAL LIGATION         Family History     None        Tobacco Use    Smoking status: Current Every Day Smoker     Packs/day: 0.50     Years: 7.00     Pack years: 3.50     Types: Cigarettes    Smokeless tobacco: Never Used   Substance and Sexual Activity    Alcohol use: No    Drug use: No    Sexual activity: Not Currently     Review of Systems   Constitutional: Negative for chills and fever.   Cardiovascular: Negative for leg swelling.   Gastrointestinal: Negative for nausea and vomiting.   Skin: Positive for wound.     Objective:     Vital Signs (Most Recent):  Temp: 96.7 °F (35.9 °C) (18)  Pulse: 84 (18)  Resp: 18 (18)  BP: 127/66 (18)  SpO2: 100 % (18) Vital Signs (24h Range):  Temp:  [96.7 °F (35.9 °C)-98.6 °F (37 °C)] 96.7 °F (35.9 °C)  Pulse:  [84-88] 84  Resp:  [18]  18  SpO2:  [97 %-100 %] 100 %  BP: ()/(55-79) 127/66     Weight: 62.6 kg (138 lb)  Body mass index is 24.45 kg/m².    Foot Exam    General  General Appearance: appears stated age and healthy   Orientation: alert and oriented to person, place, and time       Right Foot/Ankle     Inspection and Palpation  Skin Exam: drainage and ulcer; skin not intact and no erythema     Neurovascular  Dorsalis pedis: 2+  Posterior tibial: 2+  Saphenous nerve sensation: normal  Tibial nerve sensation: normal  Superficial peroneal nerve sensation: normal  Deep peroneal nerve sensation: normal  Sural nerve sensation: normal    Comments  Planter wound to the R great toe.  - no edema, erythema, drainage or SOI to the wound.  - Wound base is fibrogranular and skin margins are hyperkeratotic.  - Bone probes deep but does not appear to probe to bone.  - No tracking noted.    Left Foot/Ankle      Inspection and Palpation  Skin Exam: skin intact;     Neurovascular  Dorsalis pedis: 2+  Posterior tibial: 2+  Saphenous nerve sensation: normal  Tibial nerve sensation: normal  Superficial peroneal nerve sensation: normal  Deep peroneal nerve sensation: normal  Sural nerve sensation: normal            Laboratory:  BMP:   Recent Labs   Lab  09/04/18   0516   GLU  224*   NA  130*   K  3.8   CL  98   CO2  25   BUN  15   CREATININE  0.7   CALCIUM  8.3*   MG  2.0     CBC:   Recent Labs   Lab  09/04/18   0516   WBC  6.70   RBC  4.16   HGB  11.1*   HCT  33.3*   PLT  135*   MCV  80*   MCH  26.7*   MCHC  33.3     Wound Cultures:   Recent Labs   Lab  09/02/18   2344   LABAERO  GRAM NEGATIVE MERVIN, LACTOSE   Many  Identification and susceptibility pending       Microbiology Results (last 7 days)     Procedure Component Value Units Date/Time    Blood culture [[id number]] Collected:  09/04/18 0807    Order Status:  Sent Specimen:  Blood Updated:  09/04/18 0843    Blood culture [[id number]] Collected:  09/04/18 0807    Order Status:  Sent Specimen:   Blood Updated:  09/04/18 0843    Strep A culture, throat [[id number]] Collected:  09/02/18 2343    Order Status:  Completed Specimen:  Throat Updated:  09/04/18 0744     Strep A Culture No significant growth    Blood culture #1 **CANNOT BE ORDERED STAT** [[id number]] Collected:  09/02/18 1921    Order Status:  Completed Specimen:  Blood from Peripheral, Antecubital, Left Updated:  09/04/18 0706     Blood Culture, Routine Gram stain oscar bottle: Gram negative cocci     Blood Culture, Routine Results called to and read back by: Kalie Bradley RN  09/04/2018  07:03    Blood culture #2 **CANNOT BE ORDERED STAT** [[id number]] Collected:  09/02/18 1921    Order Status:  Completed Specimen:  Blood from Peripheral, Antecubital, Left Updated:  09/04/18 0703     Blood Culture, Routine Gram stain oscar bottle: Gram Negative Cocci     Blood Culture, Routine Results called to and read back by: Kalie Bradley RN  09/04/2018  07:02    Aerobic culture [[id number]] Collected:  09/02/18 2344    Order Status:  Completed Specimen:  Wound from Toe, Right Foot Updated:  09/04/18 0659     Aerobic Bacterial Culture --     GRAM NEGATIVE MERVIN, LACTOSE   Many  Identification and susceptibility pending      Narrative:       Ulcer, purulent discharge    Culture, Anaerobic [[id number]] Collected:  09/02/18 2344    Order Status:  Completed Specimen:  Wound Updated:  09/04/18 0647     Anaerobic Culture Culture in progress    Narrative:       add on per Dr Nguyen order #[id number] 09/03/2018  00:59     Urine culture [[id number]] Collected:  09/02/18 1811    Order Status:  Completed Specimen:  Urine Updated:  09/03/18 2048     Urine Culture, Routine --     STAPHYLOCOCCUS AUREUS  >100,000cfu/ml  Susceptibility pending      Narrative:       Preferred Collection Type->Urine, Clean Catch    Aerobic culture [[id number]]     Order Status:  Canceled Specimen:  Wound from Toe, Right Foot     Culture, Anaerobe [[id number]]     Order Status:  Canceled Specimen:   Wound from Toe, Right Foot     Culture, Anaerobe [[id number]]     Order Status:  Completed Specimen:  Wound from Toe, Right Foot     Throat Screen, Rapid [[id number]] Collected:  09/02/18 2343    Order Status:  Completed Specimen:  Throat Updated:  09/03/18 0015     Rapid Strep A Screen Negative     Comment: See Micro for reflexed Strep culture.       Culture, Respiratory with Gram Stain [[id number]]     Order Status:  No result Specimen:  Respiratory     Culture, Body Fluid - Bactec [[id number]]     Order Status:  Canceled Specimen:  Body Fluid from Toe, Right Foot     Gram stain [[id number]] Collected:  09/02/18 1811    Order Status:  Completed Specimen:  Urine from Clean Catch Updated:  09/02/18 2049     Gram Stain Result Few WBC's      Few Gram positive cocci      Few Gram negative rods    Narrative:       add on test urine gram stain per dr celestine rodriguez order #[id number]   09/02/2018  20:21     Gram stain [[id number]]     Order Status:  Completed Specimen:  Urine, Clean Catch           Diagnostic Results:  MRI: I have reviewed all pertinent results/findings within the past 24 hours.  No signs of acute osteomyelitis or abscess    Clinical Findings:

## 2018-09-04 NOTE — PLAN OF CARE
Problem: Occupational Therapy Goal  Goal: Occupational Therapy Goal  Outcome: Outcome(s) achieved Date Met: 09/04/18  Eval completed; no OT needs

## 2018-09-04 NOTE — ASSESSMENT & PLAN NOTE
- podiatry consulted  - MRI of R foot showed no evidence of osteomyelitis or drainable fluid collection.  - debridement was performed by podiatry (on 9/4) and wound has dressed properly.  - wound culture showed gram negative rods lactose . Continue on Vanc and Zosyn.

## 2018-09-04 NOTE — ASSESSMENT & PLAN NOTE
- rocephin given in ED for possible UTI   - urine culture showed staph aureus, no symptoms or signs of UTI.  - continue on broad spectrum Abx

## 2018-09-04 NOTE — PROGRESS NOTES
[facility]  Podiatry  Progress Note    Patient Name: Mariah Foley  MRN: [id number]  Admission Date: 2018  Hospital Length of Stay: 2 days  Attending Physician: Joe Helton MD  Primary Care Provider: Lukas Stein MD   Interval Hx:  Patient Seen at bedside for dressing change and bedside debridement.  Patient denies F/C/N/V.  Dressings clean, dry, and intact.      Scheduled Meds:   cetirizine  10 mg Oral Daily    DULoxetine  30 mg Oral Daily    enoxaparin  40 mg Subcutaneous Daily    fluticasone  2 spray Each Nare Daily    gabapentin  300 mg Oral TID    insulin aspart U-100  5 Units Subcutaneous TIDWM    insulin detemir U-100  10 Units Subcutaneous BID    nicotine  1 patch Transdermal Daily    piperacillin-tazobactam (ZOSYN) IVPB  4.5 g Intravenous Q8H    vancomycin (VANCOCIN) IVPB  15 mg/kg Intravenous Q12H     Continuous Infusions:  PRN Meds:acetaminophen, dextrose 50%, dextrose 50%, glucagon (human recombinant), glucose, glucose, HYDROcodone-acetaminophen, insulin aspart U-100, ketorolac, ondansetron, sodium chloride 0.9%    Review of patient's allergies indicates:   Allergen Reactions    Bactrim [sulfamethoxazole-trimethoprim]         Past Medical History:   Diagnosis Date    Diabetes mellitus      Past Surgical History:   Procedure Laterality Date     SECTION, CLASSIC      TUBAL LIGATION         Family History     None        Tobacco Use    Smoking status: Current Every Day Smoker     Packs/day: 0.50     Years: 7.00     Pack years: 3.50     Types: Cigarettes    Smokeless tobacco: Never Used   Substance and Sexual Activity    Alcohol use: No    Drug use: No    Sexual activity: Not Currently     Review of Systems   Constitutional: Negative for chills and fever.   Cardiovascular: Negative for leg swelling.   Gastrointestinal: Negative for nausea and vomiting.   Skin: Positive for wound.     Objective:     Vital Signs (Most Recent):  Temp: 96.7 °F (35.9 °C)  (09/04/18 0802)  Pulse: 84 (09/04/18 0802)  Resp: 18 (09/04/18 0802)  BP: 127/66 (09/04/18 0802)  SpO2: 100 % (09/04/18 0802) Vital Signs (24h Range):  Temp:  [96.7 °F (35.9 °C)-98.6 °F (37 °C)] 96.7 °F (35.9 °C)  Pulse:  [84-88] 84  Resp:  [18] 18  SpO2:  [97 %-100 %] 100 %  BP: ()/(55-79) 127/66     Weight: 62.6 kg (138 lb)  Body mass index is 24.45 kg/m².    Foot Exam    General  General Appearance: appears stated age and healthy   Orientation: alert and oriented to person, place, and time       Right Foot/Ankle     Inspection and Palpation  Skin Exam: drainage and ulcer; skin not intact and no erythema     Neurovascular  Dorsalis pedis: 2+  Posterior tibial: 2+  Saphenous nerve sensation: normal  Tibial nerve sensation: normal  Superficial peroneal nerve sensation: normal  Deep peroneal nerve sensation: normal  Sural nerve sensation: normal    Comments  Planter wound to the R great toe.  - no edema, erythema, drainage or SOI to the wound.  - Wound base is fibrogranular and skin margins are hyperkeratotic.  - Bone probes deep but does not appear to probe to bone.  - No tracking noted.    Left Foot/Ankle      Inspection and Palpation  Skin Exam: skin intact;     Neurovascular  Dorsalis pedis: 2+  Posterior tibial: 2+  Saphenous nerve sensation: normal  Tibial nerve sensation: normal  Superficial peroneal nerve sensation: normal  Deep peroneal nerve sensation: normal  Sural nerve sensation: normal            Laboratory:  BMP:   Recent Labs   Lab  09/04/18   0516   GLU  224*   NA  130*   K  3.8   CL  98   CO2  25   BUN  15   CREATININE  0.7   CALCIUM  8.3*   MG  2.0     CBC:   Recent Labs   Lab  09/04/18   0516   WBC  6.70   RBC  4.16   HGB  11.1*   HCT  33.3*   PLT  135*   MCV  80*   MCH  26.7*   MCHC  33.3     Wound Cultures:   Recent Labs   Lab  09/02/18   2344   LABAERO  GRAM NEGATIVE MERVIN, LACTOSE   Many  Identification and susceptibility pending       Microbiology Results (last 7 days)      Procedure Component Value Units Date/Time    Blood culture [[id number]] Collected:  09/04/18 0807    Order Status:  Sent Specimen:  Blood Updated:  09/04/18 0843    Blood culture [[id number]] Collected:  09/04/18 0807    Order Status:  Sent Specimen:  Blood Updated:  09/04/18 0843    Strep A culture, throat [[id number]] Collected:  09/02/18 2343    Order Status:  Completed Specimen:  Throat Updated:  09/04/18 0744     Strep A Culture No significant growth    Blood culture #1 **CANNOT BE ORDERED STAT** [[id number]] Collected:  09/02/18 1921    Order Status:  Completed Specimen:  Blood from Peripheral, Antecubital, Left Updated:  09/04/18 0706     Blood Culture, Routine Gram stain oscar bottle: Gram negative cocci     Blood Culture, Routine Results called to and read back by: Kalie Bradley RN  09/04/2018  07:03    Blood culture #2 **CANNOT BE ORDERED STAT** [[id number]] Collected:  09/02/18 1921    Order Status:  Completed Specimen:  Blood from Peripheral, Antecubital, Left Updated:  09/04/18 0703     Blood Culture, Routine Gram stain oscar bottle: Gram Negative Cocci     Blood Culture, Routine Results called to and read back by: Kalie Bradley RN  09/04/2018  07:02    Aerobic culture [[id number]] Collected:  09/02/18 2344    Order Status:  Completed Specimen:  Wound from Toe, Right Foot Updated:  09/04/18 0659     Aerobic Bacterial Culture --     GRAM NEGATIVE MERVIN, LACTOSE   Many  Identification and susceptibility pending      Narrative:       Ulcer, purulent discharge    Culture, Anaerobic [[id number]] Collected:  09/02/18 2344    Order Status:  Completed Specimen:  Wound Updated:  09/04/18 0647     Anaerobic Culture Culture in progress    Narrative:       add on per Dr Nguyen order #[id number] 09/03/2018  00:59     Urine culture [[id number]] Collected:  09/02/18 1811    Order Status:  Completed Specimen:  Urine Updated:  09/03/18 2048     Urine Culture, Routine --     STAPHYLOCOCCUS AUREUS  >100,000cfu/ml  Susceptibility  pending      Narrative:       Preferred Collection Type->Urine, Clean Catch    Aerobic culture [[id number]]     Order Status:  Canceled Specimen:  Wound from Toe, Right Foot     Culture, Anaerobe [[id number]]     Order Status:  Canceled Specimen:  Wound from Toe, Right Foot     Culture, Anaerobe [[id number]]     Order Status:  Completed Specimen:  Wound from Toe, Right Foot     Throat Screen, Rapid [[id number]] Collected:  09/02/18 2343    Order Status:  Completed Specimen:  Throat Updated:  09/03/18 0015     Rapid Strep A Screen Negative     Comment: See Micro for reflexed Strep culture.       Culture, Respiratory with Gram Stain [[id number]]     Order Status:  No result Specimen:  Respiratory     Culture, Body Fluid - Bactec [[id number]]     Order Status:  Canceled Specimen:  Body Fluid from Toe, Right Foot     Gram stain [[id number]] Collected:  09/02/18 1811    Order Status:  Completed Specimen:  Urine from Clean Catch Updated:  09/02/18 2049     Gram Stain Result Few WBC's      Few Gram positive cocci      Few Gram negative rods    Narrative:       add on test urine gram stain per dr celestine rodriguez order #[id number]   09/02/2018  20:21     Gram stain [[id number]]     Order Status:  Completed Specimen:  Urine, Clean Catch           Diagnostic Results:  MRI: I have reviewed all pertinent results/findings within the past 24 hours.  No signs of acute osteomyelitis or abscess    Clinical Findings:              Assessment/Plan:     DM2 (diabetes mellitus, type 2)    Per primary        Sepsis    Per primary        Diabetic ulcer of toe of right foot    -Right plantar 1st toe wound is stabled. No erythema, edema, drainage, SOI.  -MRI reviewed: No signs of acute osteomyelitis or abscess  -With verbal consent from the patient, the wound was debrided today.   -Wound was painted with betadine and DSD today.  -Patient to WBAT in a darco shoe.  -Podiatry will follow.            Shawn Moe MD  Podiatry  [facility]

## 2018-09-04 NOTE — PLAN OF CARE
PCP- DR. ARABELLA UNGER    PT HAS A RIDE HOME AND FAMILY SUPPORT WITH [employer].    PHARMACY- [id number] [place] [address]       09/04/18 9370   Discharge Assessment   Assessment Type Discharge Planning Assessment   Confirmed/corrected address and phone number on facesheet? Yes   Assessment information obtained from? Patient   Expected Length of Stay (days) 3   Communicated expected length of stay with patient/caregiver yes   Prior to hospitilization cognitive status: Alert/Oriented   Prior to hospitalization functional status: Independent   Current cognitive status: Alert/Oriented   Current Functional Status: Independent   Lives With friend(s)   Able to Return to Prior Arrangements yes   Is patient able to care for self after discharge? Yes   Patient's perception of discharge disposition home or selfcare   Readmission Within The Last 30 Days no previous admission in last 30 days   Patient currently being followed by outpatient case management? No   Patient currently receives any other outside agency services? No   Equipment Currently Used at Home none   Do you have any problems affording any of your prescribed medications? No   Is the patient taking medications as prescribed? yes   Does the patient have transportation home? Yes   Transportation Available car;family or friend will provide   Does the patient receive services at the [facility]? No   Discharge Plan A Home with family   Discharge Plan B Home with family;Home

## 2018-09-04 NOTE — SUBJECTIVE & OBJECTIVE
Past Medical History:   Diagnosis Date    Diabetes mellitus        Past Surgical History:   Procedure Laterality Date     SECTION, CLASSIC      TUBAL LIGATION         Review of patient's allergies indicates:   Allergen Reactions    Bactrim [sulfamethoxazole-trimethoprim]        No current facility-administered medications on file prior to encounter.      Current Outpatient Medications on File Prior to Encounter   Medication Sig    calcium carbonate (TUMS) 200 mg calcium (500 mg) chewable tablet Take 1 tablet by mouth daily as needed for Heartburn.    cyclobenzaprine (FLEXERIL) 10 MG tablet Take 10 mg by mouth 3 (three) times daily.    ergocalciferol (VITAMIN D2) 50,000 unit Cap Take 50,000 Units by mouth every 7 days.    escitalopram oxalate (LEXAPRO) 10 MG tablet Take 10 mg by mouth once daily.    gabapentin (NEURONTIN) 800 MG tablet Take 800 mg by mouth 3 (three) times daily.    insulin glargine (BASAGLAR KWIKPEN U-100 INSULIN) 100 unit/mL (3 mL) InPn pen Inject 10 Units into the skin every evening.    mupirocin (BACTROBAN) 2 % ointment Apply topically daily as needed    vancomycin HCl (VANCOMYCIN, BULK,) 900 mcg/mg (not less than) Powd 1 application by Misc.(Non-Drug; Combo Route) route daily as needed (apply powder to right foot as needed).      Family History     None        Tobacco Use    Smoking status: Current Every Day Smoker     Packs/day: 0.50     Years: 7.00     Pack years: 3.50     Types: Cigarettes    Smokeless tobacco: Never Used   Substance and Sexual Activity    Alcohol use: No    Drug use: No    Sexual activity: Not Currently     Review of Systems   Constitutional: Positive for fatigue. Negative for appetite change, chills, diaphoresis and fever.   HENT: Positive for congestion, ear pain, rhinorrhea and sore throat. Negative for mouth sores, nosebleeds, postnasal drip, sneezing, tinnitus and trouble swallowing.    Eyes: Negative for photophobia, pain, discharge and visual  disturbance.   Respiratory: Positive for cough. Negative for choking, chest tightness, shortness of breath, wheezing and stridor.    Cardiovascular: Negative for chest pain, palpitations and leg swelling.   Gastrointestinal: Negative for abdominal distention, abdominal pain, anal bleeding, blood in stool, constipation, diarrhea, nausea and vomiting.   Endocrine: Negative for polydipsia, polyphagia and polyuria.   Genitourinary: Negative for difficulty urinating, flank pain, hematuria, pelvic pain, vaginal bleeding, vaginal discharge and vaginal pain.   Musculoskeletal: Positive for back pain and myalgias. Negative for arthralgias, joint swelling, neck pain and neck stiffness.   Skin: Positive for wound. Negative for rash.   Allergic/Immunologic: Negative for immunocompromised state.   Neurological: Positive for headaches. Negative for dizziness, tremors, facial asymmetry, weakness, light-headedness and numbness.   Hematological: Negative for adenopathy. Does not bruise/bleed easily.     Objective:     Vital Signs (Most Recent):  Temp: 96.7 °F (35.9 °C) (09/04/18 0802)  Pulse: 84 (09/04/18 0802)  Resp: 18 (09/04/18 0802)  BP: 127/66 (09/04/18 0802)  SpO2: 100 % (09/04/18 0802) Vital Signs (24h Range):  Temp:  [96.7 °F (35.9 °C)-98.6 °F (37 °C)] 96.7 °F (35.9 °C)  Pulse:  [84-88] 84  Resp:  [18] 18  SpO2:  [97 %-100 %] 100 %  BP: ()/(55-79) 127/66     Weight: 62.6 kg (138 lb)  Body mass index is 24.45 kg/m².    Physical Exam   Constitutional: She is oriented to person, place, and time. She appears well-developed and well-nourished.   She is lying on the bed comfortably with no signs of pain or distress   HENT:   Head: Normocephalic and atraumatic.   Right Ear: External ear normal.   Left Ear: External ear normal.   Mouth/Throat: Oropharynx is clear and moist. No oropharyngeal exudate.   Eyes: Conjunctivae and EOM are normal. Pupils are equal, round, and reactive to light. Right eye exhibits no discharge. Left  eye exhibits no discharge. No scleral icterus.   Neck: Normal range of motion. Neck supple. No thyromegaly present.   Cardiovascular: Normal rate, regular rhythm, normal heart sounds and intact distal pulses. Exam reveals no gallop and no friction rub.   No murmur heard.  Pulmonary/Chest: Effort normal and breath sounds normal. No stridor. No respiratory distress. She has no wheezes. She has no rales. She exhibits no tenderness.   Abdominal: Soft. Bowel sounds are normal. She exhibits no distension and no mass. There is no tenderness. There is no guarding.   Musculoskeletal: Normal range of motion. She exhibits no edema or deformity.   1 cm ulcer on plantar aspect of right great toe, tissue debridement was performed by Podiatry (on 9/4) and lesion was dressed properly.   Lymphadenopathy:     She has no cervical adenopathy.   Neurological: She is alert and oriented to person, place, and time.   Skin: Skin is warm and dry. Capillary refill takes less than 2 seconds. No rash noted. No erythema.         CRANIAL NERVES     CN III, IV, VI   Pupils are equal, round, and reactive to light.  Extraocular motions are normal.        Significant Labs:   Recent Lab Results       09/04/18  0809 09/04/18  0807 09/04/18  0516 09/03/18  2131      Immature Granulocytes   0.4      Immature Grans (Abs)   0.03  Comment:  Mild elevation in immature granulocytes is non specific and   can be seen in a variety of conditions including stress response,   acute inflammation, trauma and pregnancy. Correlation with other   laboratory and clinical findings is essential.        Albumin   2.6      Alkaline Phosphatase   147      ALT   34      Anion Gap   7      AST   41      Baso #   0.02      Basophil%   0.3      Total Bilirubin   0.4  Comment:  For infants and newborns, interpretation of results should be based  on gestational age, weight and in agreement with clinical  observations.  Premature Infant recommended reference ranges:  Up to 24  hours.............<8.0 mg/dL  Up to 48 hours............<12.0 mg/dL  3-5 days..................<15.0 mg/dL  6-29 days.................<15.0 mg/dL        Blood Culture, Routine  No Growth to date[P]         No Growth to date[P]       BUN, Bld   15      Calcium   8.3      Chloride   98      CO2   25      Creatinine   0.7      Differential Method   Automated      eGFR if    >60.0      eGFR if non    >60.0  Comment:  Calculation used to obtain the estimated glomerular filtration  rate (eGFR) is the CKD-EPI equation.         Eos #   0.1      Eosinophil%   0.7      Glucose   224      Gran # (ANC)   4.8      Gran%   71.9      Hematocrit   33.3      Hemoglobin   11.1      Lymph #   1.0      Lymph%   14.2      Magnesium   2.0      MCH   26.7      MCHC   33.3      MCV   80      Mono #   0.8      Mono%   12.5      MPV   11.8      nRBC   0      Phosphorus   2.5      Platelets   135      POCT Glucose 203   270     Potassium   3.8      Total Protein   6.4      RBC   4.16      RDW   12.6      Sodium   130      WBC   6.70          All pertinent labs within the past 24 hours have been reviewed.    Significant Imaging: I have reviewed and interpreted all pertinent imaging results/findings within the past 24 hours.

## 2018-09-04 NOTE — PLAN OF CARE
Problem: Patient Care Overview  Goal: Plan of Care Review  Outcome: Ongoing (interventions implemented as appropriate)  Plan of care reviewed and updated. Pt AA+O. Pt's pain is managed with the medication ordered at this time. Pt's VS are as charted.  No falls this shift. Pt is oriented to room and call system. Will continue to [facility].

## 2018-09-04 NOTE — ASSESSMENT & PLAN NOTE
-Right plantar 1st toe wound is stabled. No erythema, edema, drainage, SOI.  -MRI reviewed: No signs of acute osteomyelitis or abscess  -With verbal consent from the patient, the wound was debrided today.   -Wound was painted with betadine and DSD today.  -Patient to WBAT in a darco shoe.  -Podiatry will follow.

## 2018-09-04 NOTE — PROGRESS NOTES
[facility] Hospital Medicine  Progress Note    Patient Name: Mariah Foley  MRN: [id number]  Patient Class: IP- Inpatient   Admission Date: 2018  Length of Stay: 2 days  Attending Physician: Joe Helton MD  Primary Care Provider: Lukas Stein MD    [facility] Medicine Team: [facility] MED 5 Chiquita Mak MD    Subjective:     Principal Problem:Chronic osteomyelitis of right foot with draining sinus    HPI:  48 yo F  with pmhx of fibromyalgia and DM2, in her usual state of health until 5 days prior to admission when she developed a sore throat with productive cough, which resolved after 2 days. Patient then developed myalgias, mainly around her left scapula and left CVA which she describes as a muscle stretching pain, 7 of 10 intensity, constant, associated with subjective fever, chills, generalized body aches, headache, sharp right ear paip, nausea, vomiting (once daily for past 4 days in the morning, nonbloody). No alleviating or aggravating factors. Patient tried ibuprofen for the pain at home which did not help. No chest or abdomian pain, diarrhea, constipation, changes in urinary habits, dysuria, hematuria, vaginal discharge or odors. Not sexually active. Has a ulcer on plantar aspect of right great toe which she says has been there for 2 months and has not noticed any pain, warmth, or discharge from it, although on examination there was obvious purulent discharge once dressing was removed. 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 Denies any recent trauma, sick contacts, travel out of the country.     Hospital Course:  9/3- Patient seen today and she is vitally stable except of low BP 97/54. Waiting for the MRI result of the R great toe. Continue on Vanc and Zosyn and f/u with Vanc trough tomorrow AM.  - NAEON, patient seen today and she is vitally stable. MRI of R great toe planter ulcer showed no evidence of osteomyelitis or drainable  fluid collection. Wound culture showed gram negative rods lactose . Urine culture showed staph aureus. Blood culture showed gram negative cocci. Repeat blood culture and continue on broad spectrum Abx (Vanc and Zosyn). Podiatry is following and debridment was performed today. Patient has productive cough, headache and congestion. Cetirizine and Fluticasone nasal spray was ordered. Insulin dose adjusted (Detemir 10U BID and Aspart 5U TID WM).    Past Medical History:   Diagnosis Date    Diabetes mellitus        Past Surgical History:   Procedure Laterality Date     SECTION, CLASSIC      TUBAL LIGATION         Review of patient's allergies indicates:   Allergen Reactions    Bactrim [sulfamethoxazole-trimethoprim]        No current facility-administered medications on file prior to encounter.      Current Outpatient Medications on File Prior to Encounter   Medication Sig    calcium carbonate (TUMS) 200 mg calcium (500 mg) chewable tablet Take 1 tablet by mouth daily as needed for Heartburn.    cyclobenzaprine (FLEXERIL) 10 MG tablet Take 10 mg by mouth 3 (three) times daily.    ergocalciferol (VITAMIN D2) 50,000 unit Cap Take 50,000 Units by mouth every 7 days.    escitalopram oxalate (LEXAPRO) 10 MG tablet Take 10 mg by mouth once daily.    gabapentin (NEURONTIN) 800 MG tablet Take 800 mg by mouth 3 (three) times daily.    insulin glargine (BASAGLAR KWIKPEN U-100 INSULIN) 100 unit/mL (3 mL) InPn pen Inject 10 Units into the skin every evening.    mupirocin (BACTROBAN) 2 % ointment Apply topically daily as needed    vancomycin HCl (VANCOMYCIN, BULK,) 900 mcg/mg (not less than) Powd 1 application by Misc.(Non-Drug; Combo Route) route daily as needed (apply powder to right foot as needed).      Family History     None        Tobacco Use    Smoking status: Current Every Day Smoker     Packs/day: 0.50     Years: 7.00     Pack years: 3.50     Types: Cigarettes    Smokeless tobacco: Never Used    Substance and Sexual Activity    Alcohol use: No    Drug use: No    Sexual activity: Not Currently     Review of Systems   Constitutional: Positive for fatigue. Negative for appetite change, chills, diaphoresis and fever.   HENT: Positive for congestion, ear pain, rhinorrhea and sore throat. Negative for mouth sores, nosebleeds, postnasal drip, sneezing, tinnitus and trouble swallowing.    Eyes: Negative for photophobia, pain, discharge and visual disturbance.   Respiratory: Positive for cough. Negative for choking, chest tightness, shortness of breath, wheezing and stridor.    Cardiovascular: Negative for chest pain, palpitations and leg swelling.   Gastrointestinal: Negative for abdominal distention, abdominal pain, anal bleeding, blood in stool, constipation, diarrhea, nausea and vomiting.   Endocrine: Negative for polydipsia, polyphagia and polyuria.   Genitourinary: Negative for difficulty urinating, flank pain, hematuria, pelvic pain, vaginal bleeding, vaginal discharge and vaginal pain.   Musculoskeletal: Positive for back pain and myalgias. Negative for arthralgias, joint swelling, neck pain and neck stiffness.   Skin: Positive for wound. Negative for rash.   Allergic/Immunologic: Negative for immunocompromised state.   Neurological: Positive for headaches. Negative for dizziness, tremors, facial asymmetry, weakness, light-headedness and numbness.   Hematological: Negative for adenopathy. Does not bruise/bleed easily.     Objective:     Vital Signs (Most Recent):  Temp: 96.7 °F (35.9 °C) (09/04/18 0802)  Pulse: 84 (09/04/18 0802)  Resp: 18 (09/04/18 0802)  BP: 127/66 (09/04/18 0802)  SpO2: 100 % (09/04/18 0802) Vital Signs (24h Range):  Temp:  [96.7 °F (35.9 °C)-98.6 °F (37 °C)] 96.7 °F (35.9 °C)  Pulse:  [84-88] 84  Resp:  [18] 18  SpO2:  [97 %-100 %] 100 %  BP: ()/(55-79) 127/66     Weight: 62.6 kg (138 lb)  Body mass index is 24.45 kg/m².    Physical Exam   Constitutional: She is oriented to  person, place, and time. She appears well-developed and well-nourished.   She is lying on the bed comfortably with no signs of pain or distress   HENT:   Head: Normocephalic and atraumatic.   Right Ear: External ear normal.   Left Ear: External ear normal.   Mouth/Throat: Oropharynx is clear and moist. No oropharyngeal exudate.   Eyes: Conjunctivae and EOM are normal. Pupils are equal, round, and reactive to light. Right eye exhibits no discharge. Left eye exhibits no discharge. No scleral icterus.   Neck: Normal range of motion. Neck supple. No thyromegaly present.   Cardiovascular: Normal rate, regular rhythm, normal heart sounds and intact distal pulses. Exam reveals no gallop and no friction rub.   No murmur heard.  Pulmonary/Chest: Effort normal and breath sounds normal. No stridor. No respiratory distress. She has no wheezes. She has no rales. She exhibits no tenderness.   Abdominal: Soft. Bowel sounds are normal. She exhibits no distension and no mass. There is no tenderness. There is no guarding.   Musculoskeletal: Normal range of motion. She exhibits no edema or deformity.   1 cm ulcer on plantar aspect of right great toe, tissue debridement was performed by Podiatry (on 9/4) and lesion was dressed properly.   Lymphadenopathy:     She has no cervical adenopathy.   Neurological: She is alert and oriented to person, place, and time.   Skin: Skin is warm and dry. Capillary refill takes less than 2 seconds. No rash noted. No erythema.         CRANIAL NERVES     CN III, IV, VI   Pupils are equal, round, and reactive to light.  Extraocular motions are normal.        Significant Labs:   Recent Lab Results       09/04/18  0809 09/04/18  0807 09/04/18  0516 09/03/18  2131      Immature Granulocytes   0.4      Immature Grans (Abs)   0.03  Comment:  Mild elevation in immature granulocytes is non specific and   can be seen in a variety of conditions including stress response,   acute inflammation, trauma and pregnancy.  Correlation with other   laboratory and clinical findings is essential.        Albumin   2.6      Alkaline Phosphatase   147      ALT   34      Anion Gap   7      AST   41      Baso #   0.02      Basophil%   0.3      Total Bilirubin   0.4  Comment:  For infants and newborns, interpretation of results should be based  on gestational age, weight and in agreement with clinical  observations.  Premature Infant recommended reference ranges:  Up to 24 hours.............<8.0 mg/dL  Up to 48 hours............<12.0 mg/dL  3-5 days..................<15.0 mg/dL  6-29 days.................<15.0 mg/dL        Blood Culture, Routine  No Growth to date[P]         No Growth to date[P]       BUN, Bld   15      Calcium   8.3      Chloride   98      CO2   25      Creatinine   0.7      Differential Method   Automated      eGFR if    >60.0      eGFR if non    >60.0  Comment:  Calculation used to obtain the estimated glomerular filtration  rate (eGFR) is the CKD-EPI equation.         Eos #   0.1      Eosinophil%   0.7      Glucose   224      Gran # (ANC)   4.8      Gran%   71.9      Hematocrit   33.3      Hemoglobin   11.1      Lymph #   1.0      Lymph%   14.2      Magnesium   2.0      MCH   26.7      MCHC   33.3      MCV   80      Mono #   0.8      Mono%   12.5      MPV   11.8      nRBC   0      Phosphorus   2.5      Platelets   135      POCT Glucose 203   270     Potassium   3.8      Total Protein   6.4      RBC   4.16      RDW   12.6      Sodium   130      WBC   6.70          All pertinent labs within the past 24 hours have been reviewed.    Significant Imaging: I have reviewed and interpreted all pertinent imaging results/findings within the past 24 hours.    Assessment/Plan:      Sepsis    - 3/4 SIRS  - suspect source is infected right great toe ulcer  - purulent discharge from ulcer swabbed and sent for culture  -podiatry consulted  -started on vanc and zosyn IV   -received one dose of rocephin in ED    -given hx of sore throat, cough, myalgia - rapid strep, influenza, HIV, and monospot testing sent.   - 2L NS bolus in ED (~30cc/kg)   - tylenol PRN for mild pain (1-3) and fever, tramadol PRN for moderate (4-7)   - blood culture showed gram negative cocci.  - urine culture showed staph aureus.  - wound culture showed gram negative rods lactose .  - continue on Vanc and Zosyn, f/u with repeated blood culture.            Diabetic ulcer of toe of right foot    - podiatry consulted  - MRI of R foot showed no evidence of osteomyelitis or drainable fluid collection.  - debridement was performed by podiatry (on 9/4) and wound has dressed properly.  - wound culture showed gram negative rods lactose . Continue on Vanc and Zosyn.        UTI (urinary tract infection)    - rocephin given in ED for possible UTI   - urine culture showed staph aureus, no symptoms or signs of UTI.  - continue on broad spectrum Abx         Cough    -CXR unremarkable  -sputum culture if productive cough continues   -will consider coverage for atypical if pt not clinically improving with vanc/zosyn  - she has headache and congestion, Cetirizine was ordered.  -Guaifenesin was ordered.          DM2 (diabetes mellitus, type 2)    -takes insulin at home, patient think it's 10 units at night. Also on glimepiride BID   -currently she is on detemir 10U BID and scheduled aspart 5U TID.  -continue f/u blood glucose.        Fibromyalgia    -currently on Duloxetine 30 mg once daily.  -resume gabapentin            Tobacco abuse    - for tobacco cessation           Debility    -PT and OT was ordered to treat and evaluate.  -f/u their recs.        Leukocytosis      -see plan for sepsis           VTE Risk Mitigation (From admission, onward)        Ordered     enoxaparin injection 40 mg  Daily      09/03/18 0722     IP VTE LOW RISK PATIENT  Once      09/02/18 2493     Place sequential compression device  Until discontinued      09/02/18  2155     IP VTE LOW RISK PATIENT  Once      09/02/18 2152              Chiquita Mak MD  Department of Hospital Medicine   [facility]

## 2018-09-04 NOTE — ASSESSMENT & PLAN NOTE
-CXR unremarkable  -sputum culture if productive cough continues   -will consider coverage for atypical if pt not clinically improving with vanc/zosyn  - she has headache and congestion, Cetirizine was ordered.  -Guaifenesin was ordered.

## 2018-09-04 NOTE — PT/OT/SLP EVAL
Occupational Therapy   Evaluation and Discharge Note    Name: Mariah Foley  MRN: [id number]  Admitting Diagnosis:  Chronic osteomyelitis of right foot with draining sinus      Recommendations:     Discharge Recommendations: home  Discharge Equipment Recommendations:  none  Barriers to discharge:  None    History:     Occupational Profile:  Living Environment: Pt lives with roommate in 1st floor apt with 0STE. Home has tub/shower  Previous level of function: PTA, pt reports independence with all ADL and IADL. Pt does not drive. Pt is not longer working following wound on toe prevents prolonged standing. Pt was not using any AD for ambulation or ADLs.  Roles and Routines: roommate,   Equipment Owned:  none  Assistance upon Discharge: Pt reports roommate works during the day, but she has family/friends who can assist as needed following d/c    Past Medical History:   Diagnosis Date    Diabetes mellitus        Past Surgical History:   Procedure Laterality Date     SECTION, CLASSIC      TUBAL LIGATION         Subjective     Chief Complaint: pain  Patient/Family stated goals: go home  Communicated with: RN prior to session.  Pain/Comfort:  · Pain Rating 1: 4/10  · Location 1: (back, posterior neck, and chest)  · Pain Addressed 1: Reposition, Distraction, Cessation of Activity  · Pain Rating Post-Intervention 1: 0/10    Patients cultural, spiritual, Hoahaoism conflicts given the current situation: none reported    Objective:     Patient found with: (no lines connected)    General Precautions: Standard, fall   Orthopedic Precautions:N/A   Braces: N/A     Occupational Performance:    Bed Mobility:    · Patient completed Supine to Sit with independence  · Patient completed Sit to Supine with independence    Functional Mobility/Transfers:  · Patient completed Sit <> Stand Transfer with independence  with  no assistive device   · Patient completed Toilet Transfer Step Transfer technique with independence with   no AD  · Functional Mobility: Pt ambulate 150 ft with independence using no AD    Activities of Daily Living:  · Lower Body Dressing: independence to don/doff B shoes while seated EOB  · Toileting: independence for clothing management and hygeine     Cognitive/Visual Perceptual:  Cognitive/Psychosocial Skills:     -       Oriented to: Person, Place and Situation   -       Follows Commands/attention:Follows multistep  commands  -       Communication: clear/fluent  -       Memory: No Deficits noted  -       Safety awareness/insight to disability: intact   -       Mood/Affect/Coping skills/emotional control: Appropriate to situation  Visual/Perceptual:      -Intact vision and hearing    Physical Exam:  Balance:    -       good sitting/standing balance  Postural examination/scapula alignment:    -       No postural abnormalities identified  Skin integrity: Visible skin intact  Edema:  None noted  Sensation:    -       Impaired  Pt reports B hand and foot numbness/tingling  Dominant hand:    -       R hand  Upper Extremity Range of Motion:     -       Right Upper Extremity: WFL   -       Left Upper Extremity: WFL  Upper Extremity Strength:    -       Right Upper Extremity: WFL  -       Left Upper Extremity: WFL    Strength:    -       Right Upper Extremity: WFL   -       Left Upper Extremity: WFL   Fine Motor Coordination:    -       Intact  Gross motor coordination:   WFL    Patient left HOB elevated with all lines intact, call button in reach and RN notified    AMPA 6 Click:  AMPAC Total Score: 24    Treatment & Education:  Pt educated on role of OT/POC  Pt educated on use of shower chair following reports of difficulty showering 2/2 unable to get R toe wet.   White board/communication board updated  Education:    Assessment:     Mariah Foley is a 47 y.o. female with a medical diagnosis of Chronic osteomyelitis of right foot with draining sinus. At this time, patient is functioning at their prior level of  "function and does not require further acute OT services.     Clinical Decision Makin.  OT Low:  "Pt evaluation falls under low complexity for evaluation coding due to performance deficits noted in 1-3 areas as stated above and 0 co-morbities affecting current functional status. Data obtained from problem focused assessments. No modifications or assistance was required for completion of evaluation. Only brief occupational profile and history review completed."     Plan:     During this hospitalization, patient does not require further acute OT services.  Please re-consult if situation changes.    · Plan of Care Reviewed with: patient    This Plan of care has been discussed with the patient who was involved in its development and understands and is in agreement with the identified goals and treatment plan    GOALS:   Multidisciplinary Problems     Occupational Therapy Goals     Not on file          Multidisciplinary Problems (Resolved)        Problem: Occupational Therapy Goal    Goal Priority Disciplines Outcome Interventions   Occupational Therapy Goal   (Resolved)     OT, PT/OT Outcome(s) achieved                    Time Tracking:     OT Date of Treatment: 18  OT Start Time: 1021  OT Stop Time: 1034  OT Total Time (min): 13 min    Billable Minutes:Evaluation 13    Imani Bailey OT  2018    "

## 2018-09-04 NOTE — ASSESSMENT & PLAN NOTE
-takes insulin at home, patient think it's 10 units at night. Also on glimepiride BID   -currently she is on detemir 10U BID and scheduled aspart 5U TID.  -continue f/u blood glucose.

## 2018-09-04 NOTE — PLAN OF CARE
Problem: Patient Care Overview  Goal: Plan of Care Review  Outcome: Ongoing (interventions implemented as appropriate)  POC reviewed with patient. Pt verbalized understanding. Questions and concerns addressed.   Pt complains of head/neck pain and a lot of face/nasal congestion. No fevers spiked. PRN pain medicine given.  No acute events overnight. Pt progressing toward goals. Will continue to monitor. See flow sheets for full assessment and VS info

## 2018-09-05 PROBLEM — R78.81 BACTEREMIA: Status: ACTIVE | Noted: 2018-09-05

## 2018-09-05 LAB
ALBUMIN SERPL BCP-MCNC: 2.3 G/DL
ALP SERPL-CCNC: 188 U/L
ALT SERPL W/O P-5'-P-CCNC: 48 U/L
ANION GAP SERPL CALC-SCNC: 7 MMOL/L
ANISOCYTOSIS BLD QL SMEAR: SLIGHT
AST SERPL-CCNC: 51 U/L
BACTERIA SPEC AEROBE CULT: NORMAL
BACTERIA THROAT CULT: NORMAL
BASOPHILS # BLD AUTO: 0.02 K/UL
BASOPHILS NFR BLD: 0.4 %
BILIRUB SERPL-MCNC: 0.2 MG/DL
BUN SERPL-MCNC: 17 MG/DL
CALCIUM SERPL-MCNC: 8.8 MG/DL
CHLORIDE SERPL-SCNC: 101 MMOL/L
CO2 SERPL-SCNC: 25 MMOL/L
CREAT SERPL-MCNC: 0.7 MG/DL
DACRYOCYTES BLD QL SMEAR: ABNORMAL
DIFFERENTIAL METHOD: ABNORMAL
EOSINOPHIL # BLD AUTO: 0.2 K/UL
EOSINOPHIL NFR BLD: 3.4 %
ERYTHROCYTE [DISTWIDTH] IN BLOOD BY AUTOMATED COUNT: 12.4 %
EST. GFR  (AFRICAN AMERICAN): >60 ML/MIN/1.73 M^2
EST. GFR  (NON AFRICAN AMERICAN): >60 ML/MIN/1.73 M^2
GLUCOSE SERPL-MCNC: 246 MG/DL
HCT VFR BLD AUTO: 31.5 %
HGB BLD-MCNC: 10.7 G/DL
IMM GRANULOCYTES # BLD AUTO: 0.02 K/UL
IMM GRANULOCYTES NFR BLD AUTO: 0.4 %
LYMPHOCYTES # BLD AUTO: 2 K/UL
LYMPHOCYTES NFR BLD: 40.9 %
MAGNESIUM SERPL-MCNC: 1.7 MG/DL
MCH RBC QN AUTO: 27.2 PG
MCHC RBC AUTO-ENTMCNC: 34 G/DL
MCV RBC AUTO: 80 FL
MONOCYTES # BLD AUTO: 0.9 K/UL
MONOCYTES NFR BLD: 18.9 %
NEUTROPHILS # BLD AUTO: 1.7 K/UL
NEUTROPHILS NFR BLD: 36 %
NRBC BLD-RTO: 0 /100 WBC
PHOSPHATE SERPL-MCNC: 4.2 MG/DL
PLATELET # BLD AUTO: 147 K/UL
PLATELET BLD QL SMEAR: ABNORMAL
PMV BLD AUTO: 12.1 FL
POCT GLUCOSE: 176 MG/DL (ref 70–110)
POCT GLUCOSE: 219 MG/DL (ref 70–110)
POCT GLUCOSE: 239 MG/DL (ref 70–110)
POCT GLUCOSE: 273 MG/DL (ref 70–110)
POIKILOCYTOSIS BLD QL SMEAR: SLIGHT
POTASSIUM SERPL-SCNC: 4 MMOL/L
PROT SERPL-MCNC: 5.9 G/DL
RBC # BLD AUTO: 3.94 M/UL
SODIUM SERPL-SCNC: 133 MMOL/L
VANCOMYCIN TROUGH SERPL-MCNC: 6.4 UG/ML
WBC # BLD AUTO: 4.77 K/UL

## 2018-09-05 PROCEDURE — 99233 SBSQ HOSP IP/OBS HIGH 50: CPT | Mod: ,,, | Performed by: STUDENT IN AN ORGANIZED HEALTH CARE EDUCATION/TRAINING PROGRAM

## 2018-09-05 PROCEDURE — 25000242 PHARM REV CODE 250 ALT 637 W/ HCPCS: Performed by: STUDENT IN AN ORGANIZED HEALTH CARE EDUCATION/TRAINING PROGRAM

## 2018-09-05 PROCEDURE — 25000003 PHARM REV CODE 250: Performed by: STUDENT IN AN ORGANIZED HEALTH CARE EDUCATION/TRAINING PROGRAM

## 2018-09-05 PROCEDURE — 83735 ASSAY OF MAGNESIUM: CPT

## 2018-09-05 PROCEDURE — 84100 ASSAY OF PHOSPHORUS: CPT

## 2018-09-05 PROCEDURE — 80202 ASSAY OF VANCOMYCIN: CPT

## 2018-09-05 PROCEDURE — 80053 COMPREHEN METABOLIC PANEL: CPT

## 2018-09-05 PROCEDURE — S4991 NICOTINE PATCH NONLEGEND: HCPCS | Performed by: STUDENT IN AN ORGANIZED HEALTH CARE EDUCATION/TRAINING PROGRAM

## 2018-09-05 PROCEDURE — 63600175 PHARM REV CODE 636 W HCPCS: Performed by: STUDENT IN AN ORGANIZED HEALTH CARE EDUCATION/TRAINING PROGRAM

## 2018-09-05 PROCEDURE — 94761 N-INVAS EAR/PLS OXIMETRY MLT: CPT

## 2018-09-05 PROCEDURE — 11000001 HC ACUTE MED/SURG PRIVATE ROOM

## 2018-09-05 PROCEDURE — 97161 PT EVAL LOW COMPLEX 20 MIN: CPT

## 2018-09-05 PROCEDURE — 36415 COLL VENOUS BLD VENIPUNCTURE: CPT

## 2018-09-05 PROCEDURE — 85025 COMPLETE CBC W/AUTO DIFF WBC: CPT

## 2018-09-05 RX ADMIN — INSULIN DETEMIR 10 UNITS: 100 INJECTION, SOLUTION SUBCUTANEOUS at 12:09

## 2018-09-05 RX ADMIN — INSULIN ASPART 2 UNITS: 100 INJECTION, SOLUTION INTRAVENOUS; SUBCUTANEOUS at 12:09

## 2018-09-05 RX ADMIN — NICOTINE 1 PATCH: 14 PATCH, EXTENDED RELEASE TRANSDERMAL at 08:09

## 2018-09-05 RX ADMIN — HYDROCODONE BITARTRATE AND ACETAMINOPHEN 1 TABLET: 7.5; 325 TABLET ORAL at 02:09

## 2018-09-05 RX ADMIN — GABAPENTIN 300 MG: 300 CAPSULE ORAL at 10:09

## 2018-09-05 RX ADMIN — PIPERACILLIN AND TAZOBACTAM 4.5 G: 4; .5 INJECTION, POWDER, LYOPHILIZED, FOR SOLUTION INTRAVENOUS; PARENTERAL at 04:09

## 2018-09-05 RX ADMIN — CETIRIZINE HYDROCHLORIDE 10 MG: 5 TABLET, FILM COATED ORAL at 08:09

## 2018-09-05 RX ADMIN — GUAIFENESIN 600 MG: 600 TABLET, EXTENDED RELEASE ORAL at 10:09

## 2018-09-05 RX ADMIN — VANCOMYCIN HYDROCHLORIDE 1000 MG: 1 INJECTION, POWDER, LYOPHILIZED, FOR SOLUTION INTRAVENOUS at 06:09

## 2018-09-05 RX ADMIN — PIPERACILLIN AND TAZOBACTAM 4.5 G: 4; .5 INJECTION, POWDER, LYOPHILIZED, FOR SOLUTION INTRAVENOUS; PARENTERAL at 08:09

## 2018-09-05 RX ADMIN — INSULIN ASPART 5 UNITS: 100 INJECTION, SOLUTION INTRAVENOUS; SUBCUTANEOUS at 05:09

## 2018-09-05 RX ADMIN — ENOXAPARIN SODIUM 40 MG: 100 INJECTION SUBCUTANEOUS at 04:09

## 2018-09-05 RX ADMIN — GABAPENTIN 300 MG: 300 CAPSULE ORAL at 08:09

## 2018-09-05 RX ADMIN — DULOXETINE HYDROCHLORIDE 30 MG: 30 CAPSULE, DELAYED RELEASE ORAL at 08:09

## 2018-09-05 RX ADMIN — FLUTICASONE PROPIONATE 100 MCG: 50 SPRAY, METERED NASAL at 12:09

## 2018-09-05 RX ADMIN — PIPERACILLIN AND TAZOBACTAM 4.5 G: 4; .5 INJECTION, POWDER, LYOPHILIZED, FOR SOLUTION INTRAVENOUS; PARENTERAL at 01:09

## 2018-09-05 RX ADMIN — INSULIN ASPART 3 UNITS: 100 INJECTION, SOLUTION INTRAVENOUS; SUBCUTANEOUS at 05:09

## 2018-09-05 RX ADMIN — VANCOMYCIN HYDROCHLORIDE 1000 MG: 1 INJECTION, POWDER, LYOPHILIZED, FOR SOLUTION INTRAVENOUS at 05:09

## 2018-09-05 RX ADMIN — INSULIN ASPART 5 UNITS: 100 INJECTION, SOLUTION INTRAVENOUS; SUBCUTANEOUS at 08:09

## 2018-09-05 RX ADMIN — INSULIN ASPART 5 UNITS: 100 INJECTION, SOLUTION INTRAVENOUS; SUBCUTANEOUS at 12:09

## 2018-09-05 RX ADMIN — GABAPENTIN 300 MG: 300 CAPSULE ORAL at 02:09

## 2018-09-05 RX ADMIN — HYDROCODONE BITARTRATE AND ACETAMINOPHEN 1 TABLET: 7.5; 325 TABLET ORAL at 11:09

## 2018-09-05 RX ADMIN — INSULIN ASPART 2 UNITS: 100 INJECTION, SOLUTION INTRAVENOUS; SUBCUTANEOUS at 08:09

## 2018-09-05 RX ADMIN — GUAIFENESIN 600 MG: 600 TABLET, EXTENDED RELEASE ORAL at 08:09

## 2018-09-05 NOTE — ASSESSMENT & PLAN NOTE
-takes insulin at home, patient think it's 10 units at night. Also on glimepiride BID   -currently she is on detemir 12U BID and scheduled aspart 5U TID with SSI  - continue POCT BGL AC & HS; adjust regimen as needed

## 2018-09-05 NOTE — ASSESSMENT & PLAN NOTE
Bacteremia  Diabetic foot culture  Urinary tract infection  - presented with 3/4 SIRS. Rapid strep, influenza, HIV, and monospot testing negative.   - etiologies multifactorial including MRSA in urine culture (9/2), gram negative dionicio in blood culture 9/2/18, and klebsiella from right food ulcer 9/2/18  - empirically on vanc and zoysn. Will titrate/descalate based on sensitivities and specificities. vanc dose adjusted and next trough on 9/6/18  - appreciate further Podiatry recs

## 2018-09-05 NOTE — ASSESSMENT & PLAN NOTE
-PT and OT was ordered to treat and evaluate. Appreciate assistance.  - recommending discharge home when medically ready

## 2018-09-05 NOTE — PHYSICIAN QUERY
PT Name: Mariah Foley  MR #: [id number]     Physician Query Form - Documentation Clarification      Micki Ren RN CDS    Contact Information: [phone]    This form is a permanent document in the medical record.     Query Date: September 5, 2018    By submitting this query, we are merely seeking further clarification of documentation. Please utilize your independent clinical judgment when addressing the question(s) below.    The Medical record reflects the following:    Supporting Clinical Findings Location in Medical Record   Sepsis - 3/4 SIRS   - suspect source is infected right great toe ulcer   - purulent discharge from ulcer swabbed and sent for culture     UTI (urinary tract infection) - rocephin given in ED for possible UTI     Patient Admited for pyelonephritis.      9/2 H&P            9/3 Hospital Medicine PN      9/3 Podiatry Consult      Methicillin Resistant Staphylococcus aureus     Klebsiella pneumoniae        9/2 Culture Urine    9/2 Culture, Aerobic                                                                               Doctor, Please specify diagnosis or diagnoses associated with above clinical findings.      Please clarify sepsis related organisms-------William all that apply--------------    Provider Use Only      [  ] Methicillin Resistant Staphylococcus aureus     [  ] Klebsiella pneumoniae     [x  ] Other Organisms (Specify)_____Gram negative cocci in blood still awaiting identification in micro lab ______________________                                                                                                         [  ] Clinically undetermined

## 2018-09-05 NOTE — PLAN OF CARE
Problem: Diabetes, Type 2 (Adult)  Goal: Signs and Symptoms of Listed Potential Problems Will be Absent, Minimized or Managed (Diabetes, Type 2)  Signs and symptoms of listed potential problems will be absent, minimized or managed by discharge/transition of care (reference Diabetes, Type 2 (Adult) CPG).  Outcome: Ongoing (interventions implemented as appropriate)  Patient is alert and oriented, vital signs are stable, with patient provided instruction in safety awareness and call light usage. No falls with precautions in effect. No s/s of pressure ulcers, with dressing changed as ordered to right great toe. Patient independent with mobility with use of surgical shoe. Pain managed with PRN medications. Fluids encouraged. Will continue to monitor patient glucose levels.

## 2018-09-05 NOTE — PT/OT/SLP EVAL
Physical Therapy Evaluation and Discharge Note    Patient Name:  Mariah Foley   MRN:  [id number]    Recommendations:     Discharge Recommendations:  home   Discharge Equipment Recommendations: none(surgical shoe)   Barriers to discharge: None    Assessment:     Mariah Foley is a 47 y.o. female admitted with a medical diagnosis of Sepsis. .  At this time, patient is functioning at their prior level of function and does not require further acute PT services.     Recent Surgery: * No surgery found *      Plan:     During this hospitalization, patient does not require further acute PT services.  Please re-consult if situation changes.      Subjective     Chief Complaint: chest congestion  Patient/Family Comments/goals: to go home  Pain/Comfort:  · Pain Rating 1: (pt. did not rate)    Patients cultural, spiritual, Hinduism conflicts given the current situation: no    Living Environment:  Pt. Lives with roommate in apt. with 1 JASVIR  Prior to admission, patients level of function was indep.  Equipment used at home: none.  Upon discharge, patient will have assistance from family.    Objective:     Communicated with nursing prior to session.  Patient found supine upon PT entry to room found with: peripheral IV     General Precautions: Standard, fall   Orthopedic Precautions:N/A   Braces:       Exams:  · RUE ROM: WFL  · RUE Strength: WFL  · LUE ROM: WFL  · LUE Strength: WFL  · RLE ROM: WFL  · RLE Strength: WFL  · LLE ROM: WFL  · LLE Strength: WFL    Functional Mobility:  · Bed Mobility:     · Rolling Right: modified independence  · Scooting: modified independence  · Supine to Sit: modified independence  · Sit to Supine: modified independence  · Transfers:     · Sit to Stand:  modified independence with no AD  · Gait: 400' with Darco shoe (R) LE with Mod. Indep. without AD or LOB  · Balance: good    AM-PAC 6 CLICK MOBILITY  Total Score:24       Therapeutic Activities and Exercises:   Discussed PT POC    AM-PAC 6 CLICK  MOBILITY  Total Score:24     Patient left supine with all lines intact and call button in reach.    GOALS:   Multidisciplinary Problems     Physical Therapy Goals     Not on file          Multidisciplinary Problems (Resolved)        Problem: Physical Therapy Goal    Goal Priority Disciplines Outcome Goal Variances Interventions   Physical Therapy Goal   (Resolved)     PT, PT/OT Outcome(s) achieved                     History:     Past Medical History:   Diagnosis Date    Diabetes mellitus        Past Surgical History:   Procedure Laterality Date     SECTION, CLASSIC      TUBAL LIGATION         Clinical Decision Making:     History  Co-morbidities and personal factors that may impact the plan of care Examination  Body Structures and Functions, activity limitations and participation restrictions that may impact the plan of care Clinical Presentation   Decision Making/ Complexity Score   Co-morbidities:   [] Time since onset of injury / illness / exacerbation  [] Status of current condition  []Patient's cognitive status and safety concerns    [] Multiple Medical Problems (see med hx)  Personal Factors:   [] Patient's age  [] Prior Level of function   [] Patient's home situation (environment and family support)  [] Patient's level of motivation  [] Expected progression of patient      HISTORY:(criteria)    [] 92011 - no personal factors/history    [] 83180 - has 1-2 personal factor/comorbidity     [] 96604 - has >3 personal factor/comorbidity     Body Regions:  [] Objective examination findings  [] Head     []  Neck  [] Trunk   [] Upper Extremity  [] Lower Extremity    Body Systems:  [] For communication ability, affect, cognition, language, and learning style: the assessment of the ability to make needs known, consciousness, orientation (person, place, and time), expected emotional /behavioral responses, and learning preferences (eg, learning barriers, education  needs)  [] For the neuromuscular system: a  general assessment of gross coordinated movement (eg, balance, gait, locomotion, transfers, and transitions) and motor function  (motor control and motor learning)  [] For the musculoskeletal system: the assessment of gross symmetry, gross range of motion, gross strength, height, and weight  [] For the integumentary system: the assessment of pliability(texture), presence of scar formation, skin color, and skin integrity  [] For cardiovascular/pulmonary system: the assessment of heart rate, respiratory rate, blood pressure, and edema     Activity limitations:    [] Patient's cognitive status and saf ety concerns          [] Status of current condition      [] Weight bearing restriction  [] Cardiopulmunary Restriction    Participation Restrictions:   [] Goals and goal agreement with the patient     [] Rehab potential (prognosis) and probable outcome      Examination of Body System: (criteria)    [] 14345 - addressing 1-2 elements    [] 72244 - addressing a total of 3 or more elements     [] 35990 -  Addressing a total of 4 or more elements         Clinical Presentation: (criteria)  Choose one     On examination of body system using standardized tests and measures patient presents with (CHOOSE ONE) elements from any of the following: body structures and functions, activity limitations, and/or participation restrictions.  Leading to a clinical presentation that is considered (CHOOSE ONE)                              Clinical Decision Making  (Eval Complexity):  Choose One     Time Tracking:     PT Received On: 09/05/18  PT Start Time: 1106     PT Stop Time: 1121  PT Total Time (min): 15 min     Billable Minutes: Evaluation 15      Mustapha Dominguez, PT  09/05/2018

## 2018-09-05 NOTE — PROGRESS NOTES
[facility] Hospital Medicine  Progress Note    Patient Name: Mariah Foley  MRN: [id number]  Patient Class: IP- Inpatient   Admission Date: 2018  Length of Stay: 3 days  Attending Physician: Joe Helton MD  Primary Care Provider: Lukas Stein MD    [facility] Medicine Team: [facility] HOSP MED 5 Rachel Montesinos MD    Subjective:     Principal Problem:Sepsis    HPI:  48 yo F  with pmhx of fibromyalgia and DM2, in her usual state of health until 5 days prior to admission when she developed a sore throat with productive cough, which resolved after 2 days. Patient then developed myalgias, mainly around her left scapula and left CVA which she describes as a muscle stretching pain, 7 of 10 intensity, constant, associated with subjective fever, chills, generalized body aches, headache, sharp right ear paip, nausea, vomiting (once daily for past 4 days in the morning, nonbloody). No alleviating or aggravating factors. Patient tried ibuprofen for the pain at home which did not help. No chest or abdomian pain, diarrhea, constipation, changes in urinary habits, dysuria, hematuria, vaginal discharge or odors. Not sexually active. Has a ulcer on plantar aspect of right great toe which she says has been there for 2 months and has not noticed any pain, warmth, or discharge from it, although on examination there was obvious purulent discharge once dressing was removed. 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 Denies any recent trauma, sick contacts, travel out of the country.     Hospital Course:  9/3- Patient seen today and she is vitally stable except of low BP 97/54. Waiting for the MRI result of the R great toe. Continue on Vanc and Zosyn and f/u with Vanc trough tomorrow AM.  - NAEON, patient seen today and she is vitally stable. MRI of R great toe planter ulcer showed no evidence of osteomyelitis or drainable fluid collection. Wound culture showed gram negative rods  lactose . Urine culture showed staph aureus. Blood culture showed gram negative cocci. Repeat blood culture and continue on broad spectrum Abx (Vanc and Zosyn). Podiatry is following and debridment was performed today. Patient has productive cough, headache and congestion. Cetirizine and Fluticasone nasal spray was ordered. Insulin dose adjusted (Detemir 10U BID and Aspart 5U TID WM).  9/5 - no acute events. Awaiting further speciation    Interval History: No acute events overnight. Vitals within normal.     Review of Systems   Constitutional: Positive for fatigue. Negative for appetite change, chills, diaphoresis and fever.   HENT: Positive for congestion, ear pain, rhinorrhea and sore throat. Negative for mouth sores, nosebleeds, postnasal drip, sneezing, tinnitus and trouble swallowing.    Eyes: Negative for photophobia, pain, discharge and visual disturbance.   Respiratory: Positive for cough. Negative for choking, chest tightness, shortness of breath, wheezing and stridor.    Cardiovascular: Negative for chest pain, palpitations and leg swelling.   Gastrointestinal: Negative for abdominal distention, abdominal pain, anal bleeding, blood in stool, constipation, diarrhea, nausea and vomiting.   Endocrine: Negative for polydipsia, polyphagia and polyuria.   Genitourinary: Negative for difficulty urinating, flank pain, hematuria, pelvic pain, vaginal bleeding, vaginal discharge and vaginal pain.   Musculoskeletal: Positive for back pain and myalgias. Negative for arthralgias, joint swelling, neck pain and neck stiffness.   Skin: Positive for wound. Negative for rash.   Allergic/Immunologic: Negative for immunocompromised state.   Neurological: Positive for headaches. Negative for dizziness, tremors, facial asymmetry, weakness, light-headedness and numbness.   Hematological: Negative for adenopathy. Does not bruise/bleed easily.     Objective:     Vital Signs (Most Recent):  Temp: 96.4 °F (35.8 °C)  (09/05/18 1258)  Pulse: 85 (09/05/18 1258)  Resp: 16 (09/05/18 1258)  BP: (!) 140/81 (09/05/18 1258)  SpO2: 100 % (09/05/18 1258) Vital Signs (24h Range):  Temp:  [96.4 °F (35.8 °C)-98.4 °F (36.9 °C)] 96.4 °F (35.8 °C)  Pulse:  [78-87] 85  Resp:  [16-18] 16  SpO2:  [95 %-100 %] 100 %  BP: (130-140)/(66-91) 140/81     Weight: 62.6 kg (138 lb)  Body mass index is 24.45 kg/m².    Intake/Output Summary (Last 24 hours) at 9/5/2018 1554  Last data filed at 9/5/2018 0606  Gross per 24 hour   Intake 950 ml   Output --   Net 950 ml      Physical Exam   Constitutional: She is oriented to person, place, and time. She appears well-developed and well-nourished.   She is lying on the bed comfortably with no signs of pain or distress   HENT:   Head: Normocephalic and atraumatic.   Right Ear: External ear normal.   Left Ear: External ear normal.   Mouth/Throat: Oropharynx is clear and moist. No oropharyngeal exudate.   Eyes: Conjunctivae and EOM are normal. Pupils are equal, round, and reactive to light. Right eye exhibits no discharge. Left eye exhibits no discharge. No scleral icterus.   Neck: Normal range of motion. Neck supple. No thyromegaly present.   Cardiovascular: Normal rate, regular rhythm, normal heart sounds and intact distal pulses. Exam reveals no gallop and no friction rub.   No murmur heard.  Pulmonary/Chest: Effort normal and breath sounds normal. No stridor. No respiratory distress. She has no wheezes. She has no rales. She exhibits no tenderness.   Abdominal: Soft. Bowel sounds are normal. She exhibits no distension and no mass. There is no tenderness. There is no guarding.   Musculoskeletal: Normal range of motion. She exhibits no edema or deformity.   1 cm ulcer on plantar aspect of right great toe, tissue debridement was performed by Podiatry (on 9/4) and lesion was dressed properly.   Lymphadenopathy:     She has no cervical adenopathy.   Neurological: She is alert and oriented to person, place, and time.    Skin: Skin is warm and dry. Capillary refill takes less than 2 seconds. No rash noted. No erythema.     Significant Labs:   CBC:   Recent Labs   Lab  09/04/18   0516  09/05/18   0253   WBC  6.70  4.77   HGB  11.1*  10.7*   HCT  33.3*  31.5*   PLT  135*  147*     CMP:   Recent Labs   Lab  09/04/18   0516  09/05/18   0253   NA  130*  133*   K  3.8  4.0   CL  98  101   CO2  25  25   GLU  224*  246*   BUN  15  17   CREATININE  0.7  0.7   CALCIUM  8.3*  8.8   PROT  6.4  5.9*   ALBUMIN  2.6*  2.3*   BILITOT  0.4  0.2   ALKPHOS  147*  188*   AST  41*  51*   ALT  34  48*   ANIONGAP  7*  7*   EGFRNONAA  >60.0  >60.0       Significant Imaging: I have reviewed and interpreted all pertinent imaging results/findings within the past 24 hours.    Assessment/Plan:      * Sepsis    Bacteremia  Diabetic foot culture  Urinary tract infection  - presented with 3/4 SIRS. Rapid strep, influenza, HIV, and monospot testing negative.   - etiologies multifactorial including MRSA in urine culture (9/2), gram negative dionicio in blood culture 9/2/18, and klebsiella from right food ulcer 9/2/18  - empirically on vanc and zoysn. Will titrate/descalate based on sensitivities and specificities. vanc dose adjusted and next trough on 9/6/18  - appreciate further Podiatry recs        Cough    -CXR unremarkable  -sputum culture if productive cough continues   -will consider coverage for atypical if pt not clinically improving with vanc/zosyn  - she has headache and congestion, Cetirizine was ordered.  -Guaifenesin was ordered.          UTI (urinary tract infection)    - rocephin given in ED for possible UTI   - urine culture showed staph aureus, no symptoms or signs of UTI.  - continue on broad spectrum Abx         Leukocytosis      -see plan for sepsis         Diabetic ulcer of toe of right foot    - podiatry consulted  - MRI of R foot showed no evidence of osteomyelitis or drainable fluid collection.  - debridement was performed by podiatry (on 9/4)  and wound has dressed properly.  - wound culture showed gram negative rods lactose . Continue on Vanc and Zosyn.        DM2 (diabetes mellitus, type 2)    -takes insulin at home, patient think it's 10 units at night. Also on glimepiride BID   -currently she is on detemir 12U BID and scheduled aspart 5U TID with SSI  - continue POCT BGL AC & HS; adjust regimen as needed        Tobacco abuse    - for tobacco cessation           Fibromyalgia    -currently on Duloxetine 30 mg once daily.  -resume gabapentin            Debility    -PT and OT was ordered to treat and evaluate. Appreciate assistance.  - recommending discharge home when medically ready          VTE Risk Mitigation (From admission, onward)        Ordered     enoxaparin injection 40 mg  Daily      09/03/18 0722     IP VTE LOW RISK PATIENT  Once      09/02/18 2155     Place sequential compression device  Until discontinued      09/02/18 2155     IP VTE LOW RISK PATIENT  Once      09/02/18 2155      Diet: diabetic 2000 calories  Full code    Dispo: pending further speciation of bacteremia to descalate antibiotics    Patient seen and plan of care discussed with staff.    Rachel Montesinos MD  Department of Hospital Medicine   [facility]

## 2018-09-05 NOTE — PLAN OF CARE
Problem: Patient Care Overview  Goal: Plan of Care Review  Outcome: Ongoing (interventions implemented as appropriate)  No changes overnight. Continue IV abx. PRN norco for pain. Pt OOB and turning self independently. VSS. Will continue to monitor. All protocols ongoing.

## 2018-09-05 NOTE — SUBJECTIVE & OBJECTIVE
Interval History: No acute events overnight. Vitals within normal.     Review of Systems   Constitutional: Positive for fatigue. Negative for appetite change, chills, diaphoresis and fever.   HENT: Positive for congestion, ear pain, rhinorrhea and sore throat. Negative for mouth sores, nosebleeds, postnasal drip, sneezing, tinnitus and trouble swallowing.    Eyes: Negative for photophobia, pain, discharge and visual disturbance.   Respiratory: Positive for cough. Negative for choking, chest tightness, shortness of breath, wheezing and stridor.    Cardiovascular: Negative for chest pain, palpitations and leg swelling.   Gastrointestinal: Negative for abdominal distention, abdominal pain, anal bleeding, blood in stool, constipation, diarrhea, nausea and vomiting.   Endocrine: Negative for polydipsia, polyphagia and polyuria.   Genitourinary: Negative for difficulty urinating, flank pain, hematuria, pelvic pain, vaginal bleeding, vaginal discharge and vaginal pain.   Musculoskeletal: Positive for back pain and myalgias. Negative for arthralgias, joint swelling, neck pain and neck stiffness.   Skin: Positive for wound. Negative for rash.   Allergic/Immunologic: Negative for immunocompromised state.   Neurological: Positive for headaches. Negative for dizziness, tremors, facial asymmetry, weakness, light-headedness and numbness.   Hematological: Negative for adenopathy. Does not bruise/bleed easily.     Objective:     Vital Signs (Most Recent):  Temp: 96.4 °F (35.8 °C) (09/05/18 1258)  Pulse: 85 (09/05/18 1258)  Resp: 16 (09/05/18 1258)  BP: (!) 140/81 (09/05/18 1258)  SpO2: 100 % (09/05/18 1258) Vital Signs (24h Range):  Temp:  [96.4 °F (35.8 °C)-98.4 °F (36.9 °C)] 96.4 °F (35.8 °C)  Pulse:  [78-87] 85  Resp:  [16-18] 16  SpO2:  [95 %-100 %] 100 %  BP: (130-140)/(66-91) 140/81     Weight: 62.6 kg (138 lb)  Body mass index is 24.45 kg/m².    Intake/Output Summary (Last 24 hours) at 9/5/2018 8423  Last data filed at  9/5/2018 0606  Gross per 24 hour   Intake 950 ml   Output --   Net 950 ml      Physical Exam   Constitutional: She is oriented to person, place, and time. She appears well-developed and well-nourished.   She is lying on the bed comfortably with no signs of pain or distress   HENT:   Head: Normocephalic and atraumatic.   Right Ear: External ear normal.   Left Ear: External ear normal.   Mouth/Throat: Oropharynx is clear and moist. No oropharyngeal exudate.   Eyes: Conjunctivae and EOM are normal. Pupils are equal, round, and reactive to light. Right eye exhibits no discharge. Left eye exhibits no discharge. No scleral icterus.   Neck: Normal range of motion. Neck supple. No thyromegaly present.   Cardiovascular: Normal rate, regular rhythm, normal heart sounds and intact distal pulses. Exam reveals no gallop and no friction rub.   No murmur heard.  Pulmonary/Chest: Effort normal and breath sounds normal. No stridor. No respiratory distress. She has no wheezes. She has no rales. She exhibits no tenderness.   Abdominal: Soft. Bowel sounds are normal. She exhibits no distension and no mass. There is no tenderness. There is no guarding.   Musculoskeletal: Normal range of motion. She exhibits no edema or deformity.   1 cm ulcer on plantar aspect of right great toe, tissue debridement was performed by Podiatry (on 9/4) and lesion was dressed properly.   Lymphadenopathy:     She has no cervical adenopathy.   Neurological: She is alert and oriented to person, place, and time.   Skin: Skin is warm and dry. Capillary refill takes less than 2 seconds. No rash noted. No erythema.     Significant Labs:   CBC:   Recent Labs   Lab  09/04/18   0516  09/05/18   0253   WBC  6.70  4.77   HGB  11.1*  10.7*   HCT  33.3*  31.5*   PLT  135*  147*     CMP:   Recent Labs   Lab  09/04/18   0516  09/05/18   0253   NA  130*  133*   K  3.8  4.0   CL  98  101   CO2  25  25   GLU  224*  246*   BUN  15  17   CREATININE  0.7  0.7   CALCIUM  8.3*   8.8   PROT  6.4  5.9*   ALBUMIN  2.6*  2.3*   BILITOT  0.4  0.2   ALKPHOS  147*  188*   AST  41*  51*   ALT  34  48*   ANIONGAP  7*  7*   EGFRNONAA  >60.0  >60.0       Significant Imaging: I have reviewed and interpreted all pertinent imaging results/findings within the past 24 hours.

## 2018-09-05 NOTE — PHYSICIAN QUERY
PT Name: Mariah Foley  MR #: [id number]     Physician Query Form - Documentation Clarification      Micki Ren RN CDS    Contact Information: [phone]    This form is a permanent document in the medical record.     Query Date: September 5, 2018    By submitting this query, we are merely seeking further clarification of documentation. Please utilize your independent clinical judgment when addressing the question(s) below.    The Medical record reflects the following:    Supporting Clinical Findings Location in Medical Record   A 39-year-old female with history of diabetes presents with 5 days of generalized weakness.   It is associated with subjective fever, sweats and chills. She has generalized body aches.  Symptoms have been constant and progressively worsening without improvement     She has had several days of nausea and vomiting. She has had decreased oral intake during this time.   The primary encounter diagnosis was Pyelonephritis    UTI (urinary tract infection) - rocephin given in ED for possible UTI       Patient Admited for pyelonephritis    Methicillin Resistant Staphylococcus aureus    9/2 ED Prov note                                9/2 H&P          9/3 Podiatry Consult     9/2 Culture Urine        Piperacillin-tazobactam 4.5 gm IVPB every 8 hours    Vancomycin 1000 MG IVPB   9/4-9/5 MAR                                                                            Doctor, Please specify diagnosis or diagnoses associated with above clinical findings.    Provider Use Only      [x  ] Acute Pyelonephritis      [  ] Other conditions (Specify)_____________________                                                                                                               [  ] Clinically undetermined

## 2018-09-06 LAB
ALBUMIN SERPL BCP-MCNC: 2.6 G/DL
ALP SERPL-CCNC: 200 U/L
ALT SERPL W/O P-5'-P-CCNC: 47 U/L
ANION GAP SERPL CALC-SCNC: 8 MMOL/L
AST SERPL-CCNC: 32 U/L
BACTERIA BLD CULT: NORMAL
BASOPHILS # BLD AUTO: 0.04 K/UL
BASOPHILS NFR BLD: 0.8 %
BILIRUB SERPL-MCNC: 0.3 MG/DL
BUN SERPL-MCNC: 15 MG/DL
CALCIUM SERPL-MCNC: 9.1 MG/DL
CHLORIDE SERPL-SCNC: 99 MMOL/L
CO2 SERPL-SCNC: 29 MMOL/L
CREAT SERPL-MCNC: 0.8 MG/DL
DIFFERENTIAL METHOD: ABNORMAL
EOSINOPHIL # BLD AUTO: 0.2 K/UL
EOSINOPHIL NFR BLD: 3.5 %
ERYTHROCYTE [DISTWIDTH] IN BLOOD BY AUTOMATED COUNT: 12.4 %
EST. GFR  (AFRICAN AMERICAN): >60 ML/MIN/1.73 M^2
EST. GFR  (NON AFRICAN AMERICAN): >60 ML/MIN/1.73 M^2
GLUCOSE SERPL-MCNC: 326 MG/DL
HCT VFR BLD AUTO: 32.7 %
HGB BLD-MCNC: 11.2 G/DL
IMM GRANULOCYTES # BLD AUTO: 0.07 K/UL
IMM GRANULOCYTES NFR BLD AUTO: 1.4 %
LYMPHOCYTES # BLD AUTO: 2.2 K/UL
LYMPHOCYTES NFR BLD: 41.5 %
MAGNESIUM SERPL-MCNC: 1.7 MG/DL
MCH RBC QN AUTO: 27.4 PG
MCHC RBC AUTO-ENTMCNC: 34.3 G/DL
MCV RBC AUTO: 80 FL
MONOCYTES # BLD AUTO: 0.7 K/UL
MONOCYTES NFR BLD: 14.1 %
NEUTROPHILS # BLD AUTO: 2 K/UL
NEUTROPHILS NFR BLD: 38.7 %
NRBC BLD-RTO: 0 /100 WBC
PHOSPHATE SERPL-MCNC: 4 MG/DL
PLATELET # BLD AUTO: 202 K/UL
PMV BLD AUTO: 11.6 FL
POCT GLUCOSE: 121 MG/DL (ref 70–110)
POCT GLUCOSE: 193 MG/DL (ref 70–110)
POCT GLUCOSE: 232 MG/DL (ref 70–110)
POCT GLUCOSE: 262 MG/DL (ref 70–110)
POCT GLUCOSE: 292 MG/DL (ref 70–110)
POCT GLUCOSE: 309 MG/DL (ref 70–110)
POTASSIUM SERPL-SCNC: 4 MMOL/L
PROT SERPL-MCNC: 6.3 G/DL
RBC # BLD AUTO: 4.09 M/UL
SODIUM SERPL-SCNC: 136 MMOL/L
VANCOMYCIN TROUGH SERPL-MCNC: 8.6 UG/ML
WBC # BLD AUTO: 5.18 K/UL

## 2018-09-06 PROCEDURE — 80053 COMPREHEN METABOLIC PANEL: CPT

## 2018-09-06 PROCEDURE — 63600175 PHARM REV CODE 636 W HCPCS: Performed by: STUDENT IN AN ORGANIZED HEALTH CARE EDUCATION/TRAINING PROGRAM

## 2018-09-06 PROCEDURE — 25000003 PHARM REV CODE 250: Performed by: STUDENT IN AN ORGANIZED HEALTH CARE EDUCATION/TRAINING PROGRAM

## 2018-09-06 PROCEDURE — 83735 ASSAY OF MAGNESIUM: CPT

## 2018-09-06 PROCEDURE — 84100 ASSAY OF PHOSPHORUS: CPT

## 2018-09-06 PROCEDURE — 36415 COLL VENOUS BLD VENIPUNCTURE: CPT

## 2018-09-06 PROCEDURE — 99233 SBSQ HOSP IP/OBS HIGH 50: CPT | Mod: ,,, | Performed by: STUDENT IN AN ORGANIZED HEALTH CARE EDUCATION/TRAINING PROGRAM

## 2018-09-06 PROCEDURE — 80202 ASSAY OF VANCOMYCIN: CPT

## 2018-09-06 PROCEDURE — 11000001 HC ACUTE MED/SURG PRIVATE ROOM

## 2018-09-06 PROCEDURE — 85025 COMPLETE CBC W/AUTO DIFF WBC: CPT

## 2018-09-06 RX ORDER — POLYETHYLENE GLYCOL 3350 17 G/17G
17 POWDER, FOR SOLUTION ORAL DAILY
Status: DISCONTINUED | OUTPATIENT
Start: 2018-09-06 | End: 2018-09-07

## 2018-09-06 RX ORDER — INSULIN ASPART 100 [IU]/ML
8 INJECTION, SOLUTION INTRAVENOUS; SUBCUTANEOUS
Status: DISCONTINUED | OUTPATIENT
Start: 2018-09-06 | End: 2018-09-07

## 2018-09-06 RX ORDER — VANCOMYCIN HCL IN 5 % DEXTROSE 1G/250ML
15 PLASTIC BAG, INJECTION (ML) INTRAVENOUS EVERY 8 HOURS
Status: DISCONTINUED | OUTPATIENT
Start: 2018-09-06 | End: 2018-09-13 | Stop reason: HOSPADM

## 2018-09-06 RX ORDER — AMOXICILLIN 250 MG
1 CAPSULE ORAL 2 TIMES DAILY
Status: DISCONTINUED | OUTPATIENT
Start: 2018-09-06 | End: 2018-09-07

## 2018-09-06 RX ADMIN — GABAPENTIN 300 MG: 300 CAPSULE ORAL at 09:09

## 2018-09-06 RX ADMIN — GUAIFENESIN 600 MG: 600 TABLET, EXTENDED RELEASE ORAL at 08:09

## 2018-09-06 RX ADMIN — VANCOMYCIN 1000 MG: 1 INJECTION, SOLUTION INTRAVENOUS at 01:09

## 2018-09-06 RX ADMIN — PIPERACILLIN AND TAZOBACTAM 4.5 G: 4; .5 INJECTION, POWDER, LYOPHILIZED, FOR SOLUTION INTRAVENOUS; PARENTERAL at 09:09

## 2018-09-06 RX ADMIN — HYDROCODONE BITARTRATE AND ACETAMINOPHEN 1 TABLET: 7.5; 325 TABLET ORAL at 08:09

## 2018-09-06 RX ADMIN — FLUTICASONE PROPIONATE 100 MCG: 50 SPRAY, METERED NASAL at 09:09

## 2018-09-06 RX ADMIN — VANCOMYCIN HYDROCHLORIDE 1000 MG: 1 INJECTION, POWDER, LYOPHILIZED, FOR SOLUTION INTRAVENOUS at 06:09

## 2018-09-06 RX ADMIN — POLYETHYLENE GLYCOL 3350 17 G: 17 POWDER, FOR SOLUTION ORAL at 01:09

## 2018-09-06 RX ADMIN — HYDROCODONE BITARTRATE AND ACETAMINOPHEN 1 TABLET: 7.5; 325 TABLET ORAL at 09:09

## 2018-09-06 RX ADMIN — INSULIN ASPART 3 UNITS: 100 INJECTION, SOLUTION INTRAVENOUS; SUBCUTANEOUS at 08:09

## 2018-09-06 RX ADMIN — INSULIN ASPART 5 UNITS: 100 INJECTION, SOLUTION INTRAVENOUS; SUBCUTANEOUS at 08:09

## 2018-09-06 RX ADMIN — INSULIN DETEMIR 5 UNITS: 100 INJECTION, SOLUTION SUBCUTANEOUS at 01:09

## 2018-09-06 RX ADMIN — GUAIFENESIN 600 MG: 600 TABLET, EXTENDED RELEASE ORAL at 09:09

## 2018-09-06 RX ADMIN — DULOXETINE HYDROCHLORIDE 30 MG: 30 CAPSULE, DELAYED RELEASE ORAL at 09:09

## 2018-09-06 RX ADMIN — INSULIN ASPART 1 UNITS: 100 INJECTION, SOLUTION INTRAVENOUS; SUBCUTANEOUS at 10:09

## 2018-09-06 RX ADMIN — SENNOSIDES AND DOCUSATE SODIUM 1 TABLET: 8.6; 5 TABLET ORAL at 01:09

## 2018-09-06 RX ADMIN — ENOXAPARIN SODIUM 40 MG: 100 INJECTION SUBCUTANEOUS at 06:09

## 2018-09-06 RX ADMIN — SENNOSIDES AND DOCUSATE SODIUM 1 TABLET: 8.6; 5 TABLET ORAL at 08:09

## 2018-09-06 RX ADMIN — VANCOMYCIN 1000 MG: 1 INJECTION, SOLUTION INTRAVENOUS at 11:09

## 2018-09-06 RX ADMIN — PIPERACILLIN AND TAZOBACTAM 4.5 G: 4; .5 INJECTION, POWDER, LYOPHILIZED, FOR SOLUTION INTRAVENOUS; PARENTERAL at 07:09

## 2018-09-06 RX ADMIN — PIPERACILLIN AND TAZOBACTAM 4.5 G: 4; .5 INJECTION, POWDER, LYOPHILIZED, FOR SOLUTION INTRAVENOUS; PARENTERAL at 01:09

## 2018-09-06 RX ADMIN — GABAPENTIN 300 MG: 300 CAPSULE ORAL at 02:09

## 2018-09-06 RX ADMIN — GABAPENTIN 300 MG: 300 CAPSULE ORAL at 08:09

## 2018-09-06 RX ADMIN — CETIRIZINE HYDROCHLORIDE 10 MG: 5 TABLET, FILM COATED ORAL at 09:09

## 2018-09-06 RX ADMIN — INSULIN ASPART 8 UNITS: 100 INJECTION, SOLUTION INTRAVENOUS; SUBCUTANEOUS at 06:09

## 2018-09-06 RX ADMIN — INSULIN ASPART 5 UNITS: 100 INJECTION, SOLUTION INTRAVENOUS; SUBCUTANEOUS at 01:09

## 2018-09-06 NOTE — SUBJECTIVE & OBJECTIVE
Interval History: No acute events overnight. Vitals within normal.     Review of Systems   Constitutional: Positive for fatigue. Negative for appetite change, chills, diaphoresis and fever.   HENT: Positive for congestion and sore throat. Negative for ear pain, mouth sores, nosebleeds, postnasal drip, rhinorrhea, sneezing, tinnitus and trouble swallowing.    Eyes: Negative for photophobia, pain, discharge and visual disturbance.   Respiratory: Positive for cough. Negative for choking, chest tightness, shortness of breath, wheezing and stridor.    Cardiovascular: Negative for chest pain, palpitations and leg swelling.   Gastrointestinal: Negative for abdominal distention, abdominal pain, anal bleeding, blood in stool, constipation, diarrhea, nausea and vomiting.   Endocrine: Negative for polydipsia, polyphagia and polyuria.   Genitourinary: Negative for difficulty urinating, flank pain, hematuria, pelvic pain, vaginal bleeding, vaginal discharge and vaginal pain.   Musculoskeletal: Positive for back pain and myalgias. Negative for arthralgias, joint swelling, neck pain and neck stiffness.   Skin: Positive for wound. Negative for rash.   Allergic/Immunologic: Negative for immunocompromised state.   Neurological: Negative for dizziness, tremors, facial asymmetry, weakness, light-headedness, numbness and headaches.   Hematological: Negative for adenopathy. Does not bruise/bleed easily.     Objective:     Vital Signs (Most Recent):  Temp: 97.1 °F (36.2 °C) (09/06/18 1117)  Pulse: 83 (09/06/18 1117)  Resp: 18 (09/06/18 1117)  BP: 133/65 (09/06/18 1117)  SpO2: 96 % (09/06/18 1117) Vital Signs (24h Range):  Temp:  [96.8 °F (36 °C)-97.8 °F (36.6 °C)] 97.1 °F (36.2 °C)  Pulse:  [78-84] 83  Resp:  [16-18] 18  SpO2:  [93 %-99 %] 96 %  BP: (133-165)/(65-89) 133/65     Weight: 62.6 kg (138 lb)  Body mass index is 24.45 kg/m².    Intake/Output Summary (Last 24 hours) at 9/6/2018 1330  Last data filed at 9/6/2018 0600  Gross per 24  hour   Intake 275 ml   Output --   Net 275 ml      Physical Exam   Constitutional: She is oriented to person, place, and time. She appears well-developed and well-nourished.   She is lying on the bed comfortably with no signs of pain or distress   HENT:   Head: Normocephalic and atraumatic.   Right Ear: External ear normal.   Left Ear: External ear normal.   Mouth/Throat: Oropharynx is clear and moist. No oropharyngeal exudate.   Eyes: Conjunctivae and EOM are normal. Pupils are equal, round, and reactive to light. Right eye exhibits no discharge. Left eye exhibits no discharge. No scleral icterus.   Neck: Normal range of motion. Neck supple. No thyromegaly present.   Cardiovascular: Normal rate, regular rhythm, normal heart sounds and intact distal pulses. Exam reveals no gallop and no friction rub.   No murmur heard.  Pulmonary/Chest: Effort normal and breath sounds normal. No stridor. No respiratory distress. She has no wheezes. She has no rales. She exhibits no tenderness.   Abdominal: Soft. Bowel sounds are normal. She exhibits no distension and no mass. There is no tenderness. There is no guarding.   Musculoskeletal: Normal range of motion. She exhibits no edema or deformity.   1 cm ulcer on plantar aspect of right great toe, tissue debridement was performed by Podiatry (on 9/4) and lesion was dressed properly.   Lymphadenopathy:     She has no cervical adenopathy.   Neurological: She is alert and oriented to person, place, and time.   Skin: Skin is warm and dry. Capillary refill takes less than 2 seconds. No rash noted. No erythema.     Significant Labs:   CBC:   Recent Labs   Lab  09/05/18 0253  09/06/18   0435   WBC  4.77  5.18   HGB  10.7*  11.2*   HCT  31.5*  32.7*   PLT  147*  202     CMP:   Recent Labs   Lab  09/05/18   0253  09/06/18   0435   NA  133*  136   K  4.0  4.0   CL  101  99   CO2  25  29   GLU  246*  326*   BUN  17  15   CREATININE  0.7  0.8   CALCIUM  8.8  9.1   PROT  5.9*  6.3   ALBUMIN   2.3*  2.6*   BILITOT  0.2  0.3   ALKPHOS  188*  200*   AST  51*  32   ALT  48*  47*   ANIONGAP  7*  8   EGFRNONAA  >60.0  >60.0       Significant Imaging: I have reviewed and interpreted all pertinent imaging results/findings within the past 24 hours.

## 2018-09-06 NOTE — NURSING
Pt aaox4.  Pt remained free of falls and injuries throughout shift.  VS stable, pain assessed and treated when needed. Bed low, wheels locked, siderails up x2, call light within reach.  Will continue to monitor.

## 2018-09-06 NOTE — ASSESSMENT & PLAN NOTE
- podiatry consulted  - MRI of R foot showed no evidence of osteomyelitis or drainable fluid collection.  - debridement was performed by podiatry (on 9/4) and wound has dressed properly.  - wound culture showed gram negative rods lactose  (Klebsiella)  - Continue on Vanc and Zosyn, f/u Podiatry recs.

## 2018-09-06 NOTE — ASSESSMENT & PLAN NOTE
-CXR unremarkable  -sputum culture if productive cough continues   -will consider coverage for atypical if pt not clinically improving with vanc/zosyn  - she has headache and congestion, Cetirizine was ordered.  -Guaifenesin was ordered.  -symptoms and signs has improved, continue on the same meds.

## 2018-09-06 NOTE — PROGRESS NOTES
[facility] Hospital Medicine  Progress Note    Patient Name: Mariah Foley  MRN: [id number]  Patient Class: IP- Inpatient   Admission Date: 2018  Length of Stay: 4 days  Attending Physician: Joe Helton MD  Primary Care Provider: Lukas Stein MD    [facility] Medicine Team: [facility] HOSP MED 5 Chiquita Mak MD    Subjective:     Principal Problem:Sepsis    HPI:  48 yo F  with pmhx of fibromyalgia and DM2, in her usual state of health until 5 days prior to admission when she developed a sore throat with productive cough, which resolved after 2 days. Patient then developed myalgias, mainly around her left scapula and left CVA which she describes as a muscle stretching pain, 7 of 10 intensity, constant, associated with subjective fever, chills, generalized body aches, headache, sharp right ear paip, nausea, vomiting (once daily for past 4 days in the morning, nonbloody). No alleviating or aggravating factors. Patient tried ibuprofen for the pain at home which did not help. No chest or abdomian pain, diarrhea, constipation, changes in urinary habits, dysuria, hematuria, vaginal discharge or odors. Not sexually active. Has a ulcer on plantar aspect of right great toe which she says has been there for 2 months and has not noticed any pain, warmth, or discharge from it, although on examination there was obvious purulent discharge once dressing was removed. 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 Denies any recent trauma, sick contacts, travel out of the country.     Hospital Course:  9/3- Patient seen today and she is vitally stable except of low BP 97/54. Waiting for the MRI result of the R great toe. Continue on Vanc and Zosyn and f/u with Vanc trough tomorrow AM.  - NAEON, patient seen today and she is vitally stable. MRI of R great toe planter ulcer showed no evidence of osteomyelitis or drainable fluid collection. Wound culture showed gram negative  rods lactose . Urine culture showed staph aureus. Blood culture showed gram negative cocci. Repeat blood culture and continue on broad spectrum Abx (Vanc and Zosyn). Podiatry is following and debridment was performed today. Patient has productive cough, headache and congestion. Cetirizine and Fluticasone nasal spray was ordered. Insulin dose adjusted (Detemir 10U BID and Aspart 5U TID WM).  9/5 - no acute events. Awaiting further speciation  9/6- NAEON, patient seen today and she is vitally stable. Continue on Vanc and Zosyn and f/u with Vanc trough tomorrow AM. Adjust insulin regimen (detemir 15U BID, aspart 8U TID and continue on low dose ISS), f/u Podiatry recs.    Interval History: No acute events overnight. Vitals within normal.     Review of Systems   Constitutional: Positive for fatigue. Negative for appetite change, chills, diaphoresis and fever.   HENT: Positive for congestion and sore throat. Negative for ear pain, mouth sores, nosebleeds, postnasal drip, rhinorrhea, sneezing, tinnitus and trouble swallowing.    Eyes: Negative for photophobia, pain, discharge and visual disturbance.   Respiratory: Positive for cough. Negative for choking, chest tightness, shortness of breath, wheezing and stridor.    Cardiovascular: Negative for chest pain, palpitations and leg swelling.   Gastrointestinal: Negative for abdominal distention, abdominal pain, anal bleeding, blood in stool, constipation, diarrhea, nausea and vomiting.   Endocrine: Negative for polydipsia, polyphagia and polyuria.   Genitourinary: Negative for difficulty urinating, flank pain, hematuria, pelvic pain, vaginal bleeding, vaginal discharge and vaginal pain.   Musculoskeletal: Positive for back pain and myalgias. Negative for arthralgias, joint swelling, neck pain and neck stiffness.   Skin: Positive for wound. Negative for rash.   Allergic/Immunologic: Negative for immunocompromised state.   Neurological: Negative for dizziness, tremors,  facial asymmetry, weakness, light-headedness, numbness and headaches.   Hematological: Negative for adenopathy. Does not bruise/bleed easily.     Objective:     Vital Signs (Most Recent):  Temp: 97.1 °F (36.2 °C) (09/06/18 1117)  Pulse: 83 (09/06/18 1117)  Resp: 18 (09/06/18 1117)  BP: 133/65 (09/06/18 1117)  SpO2: 96 % (09/06/18 1117) Vital Signs (24h Range):  Temp:  [96.8 °F (36 °C)-97.8 °F (36.6 °C)] 97.1 °F (36.2 °C)  Pulse:  [78-84] 83  Resp:  [16-18] 18  SpO2:  [93 %-99 %] 96 %  BP: (133-165)/(65-89) 133/65     Weight: 62.6 kg (138 lb)  Body mass index is 24.45 kg/m².    Intake/Output Summary (Last 24 hours) at 9/6/2018 1330  Last data filed at 9/6/2018 0600  Gross per 24 hour   Intake 275 ml   Output --   Net 275 ml      Physical Exam   Constitutional: She is oriented to person, place, and time. She appears well-developed and well-nourished.   She is lying on the bed comfortably with no signs of pain or distress   HENT:   Head: Normocephalic and atraumatic.   Right Ear: External ear normal.   Left Ear: External ear normal.   Mouth/Throat: Oropharynx is clear and moist. No oropharyngeal exudate.   Eyes: Conjunctivae and EOM are normal. Pupils are equal, round, and reactive to light. Right eye exhibits no discharge. Left eye exhibits no discharge. No scleral icterus.   Neck: Normal range of motion. Neck supple. No thyromegaly present.   Cardiovascular: Normal rate, regular rhythm, normal heart sounds and intact distal pulses. Exam reveals no gallop and no friction rub.   No murmur heard.  Pulmonary/Chest: Effort normal and breath sounds normal. No stridor. No respiratory distress. She has no wheezes. She has no rales. She exhibits no tenderness.   Abdominal: Soft. Bowel sounds are normal. She exhibits no distension and no mass. There is no tenderness. There is no guarding.   Musculoskeletal: Normal range of motion. She exhibits no edema or deformity.   1 cm ulcer on plantar aspect of right great toe, tissue  debridement was performed by Podiatry (on 9/4) and lesion was dressed properly.   Lymphadenopathy:     She has no cervical adenopathy.   Neurological: She is alert and oriented to person, place, and time.   Skin: Skin is warm and dry. Capillary refill takes less than 2 seconds. No rash noted. No erythema.     Significant Labs:   CBC:   Recent Labs   Lab  09/05/18 0253  09/06/18   0435   WBC  4.77  5.18   HGB  10.7*  11.2*   HCT  31.5*  32.7*   PLT  147*  202     CMP:   Recent Labs   Lab  09/05/18 0253  09/06/18   0435   NA  133*  136   K  4.0  4.0   CL  101  99   CO2  25  29   GLU  246*  326*   BUN  17  15   CREATININE  0.7  0.8   CALCIUM  8.8  9.1   PROT  5.9*  6.3   ALBUMIN  2.3*  2.6*   BILITOT  0.2  0.3   ALKPHOS  188*  200*   AST  51*  32   ALT  48*  47*   ANIONGAP  7*  8   EGFRNONAA  >60.0  >60.0       Significant Imaging: I have reviewed and interpreted all pertinent imaging results/findings within the past 24 hours.    Assessment/Plan:      * Sepsis    Bacteremia  Diabetic foot culture  Urinary tract infection  - presented with 3/4 SIRS. Rapid strep, influenza, HIV, and monospot testing negative.   - etiologies multifactorial including MRSA in urine culture (9/2), gram negative dionicio in blood culture 9/2/18, and klebsiella from right food ulcer 9/2/18  - empirically on vanc and zoysn. Will titrate/descalate based on sensitivities and specificities. vanc dose adjusted and next trough on 9/7/18  - appreciate further Podiatry recs        Diabetic ulcer of toe of right foot    - podiatry consulted  - MRI of R foot showed no evidence of osteomyelitis or drainable fluid collection.  - debridement was performed by podiatry (on 9/4) and wound has dressed properly.  - wound culture showed gram negative rods lactose  (Klebsiella)  - Continue on Vanc and Zosyn, f/u Podiatry recs.        UTI (urinary tract infection)    - rocephin given in ED for possible UTI   - urine culture showed staph aureus, no symptoms  or signs of UTI.  - continue on broad spectrum Abx         Cough    -CXR unremarkable  -sputum culture if productive cough continues   -will consider coverage for atypical if pt not clinically improving with vanc/zosyn  - she has headache and congestion, Cetirizine was ordered.  -Guaifenesin was ordered.  -symptoms and signs has improved, continue on the same meds.        DM2 (diabetes mellitus, type 2)    -takes insulin at home, patient think it's 10 units at night. Also on glimepiride BID   -currently she is on detemir 15U BID and scheduled aspart 8U TID with SSI  -continue POCT BGL AC & HS; adjust regimen as needed        Fibromyalgia    -currently on Duloxetine 30 mg once daily.  -resume gabapentin 300 TID            Tobacco abuse    - for tobacco cessation           Debility    -PT and OT was ordered to treat and evaluate. Appreciate assistance.  - recommending discharge home when medically ready        Leukocytosis      -see plan for sepsis         Bacteremia    - blood culture showed gram negative cocci.  - continue on Vanc and Zosyn.            VTE Risk Mitigation (From admission, onward)        Ordered     enoxaparin injection 40 mg  Daily      09/03/18 0722     IP VTE LOW RISK PATIENT  Once      09/02/18 2155     Place sequential compression device  Until discontinued      09/02/18 2155     IP VTE LOW RISK PATIENT  Once      09/02/18 2155              Chiquita Mak MD  Department of Hospital Medicine   [facility]

## 2018-09-06 NOTE — ASSESSMENT & PLAN NOTE
-takes insulin at home, patient think it's 10 units at night. Also on glimepiride BID   -currently she is on detemir 15U BID and scheduled aspart 8U TID with SSI  -continue POCT BGL AC & HS; adjust regimen as needed

## 2018-09-07 LAB
ALBUMIN SERPL BCP-MCNC: 2.6 G/DL
ALP SERPL-CCNC: 195 U/L
ALT SERPL W/O P-5'-P-CCNC: 66 U/L
ANION GAP SERPL CALC-SCNC: 8 MMOL/L
AST SERPL-CCNC: 57 U/L
BACTERIA SPEC ANAEROBE CULT: NORMAL
BACTERIA SPEC ANAEROBE CULT: NORMAL
BASOPHILS # BLD AUTO: 0.07 K/UL
BASOPHILS NFR BLD: 0.9 %
BILIRUB SERPL-MCNC: 0.3 MG/DL
BUN SERPL-MCNC: 16 MG/DL
CALCIUM SERPL-MCNC: 9 MG/DL
CHLORIDE SERPL-SCNC: 101 MMOL/L
CO2 SERPL-SCNC: 26 MMOL/L
CREAT SERPL-MCNC: 0.8 MG/DL
DIFFERENTIAL METHOD: ABNORMAL
EOSINOPHIL # BLD AUTO: 0.3 K/UL
EOSINOPHIL NFR BLD: 3.2 %
ERYTHROCYTE [DISTWIDTH] IN BLOOD BY AUTOMATED COUNT: 12.4 %
EST. GFR  (AFRICAN AMERICAN): >60 ML/MIN/1.73 M^2
EST. GFR  (NON AFRICAN AMERICAN): >60 ML/MIN/1.73 M^2
GLUCOSE SERPL-MCNC: 395 MG/DL
HCT VFR BLD AUTO: 33.9 %
HGB BLD-MCNC: 11.2 G/DL
IMM GRANULOCYTES # BLD AUTO: 0.25 K/UL
IMM GRANULOCYTES NFR BLD AUTO: 3.2 %
LYMPHOCYTES # BLD AUTO: 3 K/UL
LYMPHOCYTES NFR BLD: 38.8 %
MAGNESIUM SERPL-MCNC: 1.8 MG/DL
MCH RBC QN AUTO: 26.9 PG
MCHC RBC AUTO-ENTMCNC: 33 G/DL
MCV RBC AUTO: 82 FL
MONOCYTES # BLD AUTO: 1.2 K/UL
MONOCYTES NFR BLD: 15.5 %
NEUTROPHILS # BLD AUTO: 3 K/UL
NEUTROPHILS NFR BLD: 38.4 %
NRBC BLD-RTO: 0 /100 WBC
PHOSPHATE SERPL-MCNC: 3.5 MG/DL
PLATELET # BLD AUTO: 220 K/UL
PMV BLD AUTO: 11 FL
POCT GLUCOSE: 202 MG/DL (ref 70–110)
POCT GLUCOSE: 283 MG/DL (ref 70–110)
POCT GLUCOSE: 313 MG/DL (ref 70–110)
POCT GLUCOSE: 336 MG/DL (ref 70–110)
POTASSIUM SERPL-SCNC: 3.9 MMOL/L
PROT SERPL-MCNC: 6.2 G/DL
RBC # BLD AUTO: 4.16 M/UL
SODIUM SERPL-SCNC: 135 MMOL/L
VANCOMYCIN TROUGH SERPL-MCNC: 19 UG/ML
WBC # BLD AUTO: 7.79 K/UL

## 2018-09-07 PROCEDURE — 85025 COMPLETE CBC W/AUTO DIFF WBC: CPT

## 2018-09-07 PROCEDURE — 99499 UNLISTED E&M SERVICE: CPT | Mod: ,,, | Performed by: PHYSICIAN ASSISTANT

## 2018-09-07 PROCEDURE — 63600175 PHARM REV CODE 636 W HCPCS: Performed by: STUDENT IN AN ORGANIZED HEALTH CARE EDUCATION/TRAINING PROGRAM

## 2018-09-07 PROCEDURE — 63600175 PHARM REV CODE 636 W HCPCS: Performed by: PHYSICIAN ASSISTANT

## 2018-09-07 PROCEDURE — 99233 SBSQ HOSP IP/OBS HIGH 50: CPT | Mod: ,,, | Performed by: INTERNAL MEDICINE

## 2018-09-07 PROCEDURE — 84100 ASSAY OF PHOSPHORUS: CPT

## 2018-09-07 PROCEDURE — 83735 ASSAY OF MAGNESIUM: CPT

## 2018-09-07 PROCEDURE — 80053 COMPREHEN METABOLIC PANEL: CPT

## 2018-09-07 PROCEDURE — 25000003 PHARM REV CODE 250: Performed by: STUDENT IN AN ORGANIZED HEALTH CARE EDUCATION/TRAINING PROGRAM

## 2018-09-07 PROCEDURE — 80202 ASSAY OF VANCOMYCIN: CPT

## 2018-09-07 PROCEDURE — 36415 COLL VENOUS BLD VENIPUNCTURE: CPT

## 2018-09-07 PROCEDURE — 11000001 HC ACUTE MED/SURG PRIVATE ROOM

## 2018-09-07 PROCEDURE — 25000003 PHARM REV CODE 250: Performed by: PHYSICIAN ASSISTANT

## 2018-09-07 PROCEDURE — 99233 SBSQ HOSP IP/OBS HIGH 50: CPT | Mod: ,,, | Performed by: STUDENT IN AN ORGANIZED HEALTH CARE EDUCATION/TRAINING PROGRAM

## 2018-09-07 RX ORDER — KETOROLAC TROMETHAMINE 10 MG/1
10 TABLET, FILM COATED ORAL EVERY 6 HOURS PRN
Status: DISPENSED | OUTPATIENT
Start: 2018-09-07 | End: 2018-09-12

## 2018-09-07 RX ORDER — AMOXICILLIN 250 MG
2 CAPSULE ORAL 2 TIMES DAILY
Status: DISCONTINUED | OUTPATIENT
Start: 2018-09-07 | End: 2018-09-13 | Stop reason: HOSPADM

## 2018-09-07 RX ORDER — FLUTICASONE PROPIONATE 50 MCG
1 SPRAY, SUSPENSION (ML) NASAL DAILY
Status: DISCONTINUED | OUTPATIENT
Start: 2018-09-08 | End: 2018-09-13 | Stop reason: HOSPADM

## 2018-09-07 RX ORDER — GUAIFENESIN 600 MG/1
600 TABLET, EXTENDED RELEASE ORAL 2 TIMES DAILY PRN
Status: DISCONTINUED | OUTPATIENT
Start: 2018-09-07 | End: 2018-09-11

## 2018-09-07 RX ORDER — INSULIN ASPART 100 [IU]/ML
10 INJECTION, SOLUTION INTRAVENOUS; SUBCUTANEOUS
Status: DISCONTINUED | OUTPATIENT
Start: 2018-09-07 | End: 2018-09-08

## 2018-09-07 RX ORDER — POLYETHYLENE GLYCOL 3350 17 G/17G
17 POWDER, FOR SOLUTION ORAL 2 TIMES DAILY
Status: DISCONTINUED | OUTPATIENT
Start: 2018-09-07 | End: 2018-09-13 | Stop reason: HOSPADM

## 2018-09-07 RX ADMIN — GABAPENTIN 300 MG: 300 CAPSULE ORAL at 10:09

## 2018-09-07 RX ADMIN — INSULIN ASPART 8 UNITS: 100 INJECTION, SOLUTION INTRAVENOUS; SUBCUTANEOUS at 10:09

## 2018-09-07 RX ADMIN — HYDROCODONE BITARTRATE AND ACETAMINOPHEN 1 TABLET: 7.5; 325 TABLET ORAL at 06:09

## 2018-09-07 RX ADMIN — INSULIN ASPART 2 UNITS: 100 INJECTION, SOLUTION INTRAVENOUS; SUBCUTANEOUS at 12:09

## 2018-09-07 RX ADMIN — VANCOMYCIN 1000 MG: 1 INJECTION, SOLUTION INTRAVENOUS at 03:09

## 2018-09-07 RX ADMIN — PIPERACILLIN AND TAZOBACTAM 4.5 G: 4; .5 INJECTION, POWDER, LYOPHILIZED, FOR SOLUTION INTRAVENOUS; PARENTERAL at 10:09

## 2018-09-07 RX ADMIN — PIPERACILLIN AND TAZOBACTAM 4.5 G: 4; .5 INJECTION, POWDER, LYOPHILIZED, FOR SOLUTION INTRAVENOUS; PARENTERAL at 01:09

## 2018-09-07 RX ADMIN — FLUTICASONE PROPIONATE 100 MCG: 50 SPRAY, METERED NASAL at 10:09

## 2018-09-07 RX ADMIN — ENOXAPARIN SODIUM 40 MG: 100 INJECTION SUBCUTANEOUS at 06:09

## 2018-09-07 RX ADMIN — SENNOSIDES AND DOCUSATE SODIUM 2 TABLET: 8.6; 5 TABLET ORAL at 10:09

## 2018-09-07 RX ADMIN — AMPICILLIN AND SULBACTAM 3 G: 2; 1 INJECTION, POWDER, FOR SOLUTION INTRAVENOUS at 10:09

## 2018-09-07 RX ADMIN — POLYETHYLENE GLYCOL 3350 17 G: 17 POWDER, FOR SOLUTION ORAL at 10:09

## 2018-09-07 RX ADMIN — CETIRIZINE HYDROCHLORIDE 10 MG: 5 TABLET, FILM COATED ORAL at 10:09

## 2018-09-07 RX ADMIN — INSULIN ASPART 10 UNITS: 100 INJECTION, SOLUTION INTRAVENOUS; SUBCUTANEOUS at 12:09

## 2018-09-07 RX ADMIN — DULOXETINE HYDROCHLORIDE 30 MG: 30 CAPSULE, DELAYED RELEASE ORAL at 10:09

## 2018-09-07 RX ADMIN — GABAPENTIN 300 MG: 300 CAPSULE ORAL at 03:09

## 2018-09-07 RX ADMIN — VANCOMYCIN 1000 MG: 1 INJECTION, SOLUTION INTRAVENOUS at 06:09

## 2018-09-07 RX ADMIN — SENNOSIDES AND DOCUSATE SODIUM 1 TABLET: 8.6; 5 TABLET ORAL at 10:09

## 2018-09-07 RX ADMIN — INSULIN ASPART 3 UNITS: 100 INJECTION, SOLUTION INTRAVENOUS; SUBCUTANEOUS at 06:09

## 2018-09-07 RX ADMIN — INSULIN ASPART 10 UNITS: 100 INJECTION, SOLUTION INTRAVENOUS; SUBCUTANEOUS at 06:09

## 2018-09-07 RX ADMIN — GUAIFENESIN 600 MG: 600 TABLET, EXTENDED RELEASE ORAL at 10:09

## 2018-09-07 NOTE — PLAN OF CARE
Problem: Patient Care Overview  Goal: Plan of Care Review  Outcome: Ongoing (interventions implemented as appropriate)  Pt is progressing with plan of care. Frequent rounds made to assess pain and safety. Pt's pain is controlled with pain medication ordered at this time. Bed locked and at lowest position. Side rails up X's 2. Call light within reach. Will continue to monitor.

## 2018-09-07 NOTE — CONSULTS
[facility]  Infectious Disease  Consult Note    Patient Name: Mariah Foley  MRN: [id number]  Admission Date: 9/2/2018  Hospital Length of Stay: 5 days  Attending Physician: Joe Helton MD  Primary Care Provider: Lukas Stein MD         Inpatient consult to Infectious Diseases  Consult performed by: WAQAS Negron Jr.  Consult ordered by: Chiquita Mak MD      Consult received.  Full consult to follow.    WAQAS Warren  Infectious Disease  [facility]

## 2018-09-07 NOTE — CONSULTS
[facility]  Infectious Disease  Consult Note    Patient Name: Mariah Foley  MRN: [id number]  Admission Date: 9/2/2018  Hospital Length of Stay: 5 days  Attending Physician: Joe Helton MD  Primary Care Provider: Lukas Stein MD     Isolation Status: Contact    Patient information was obtained from patient and records.      Consults  Assessment/Plan:     Bacteremia    - BCXs 9/2 show Veillonella parvula - given is growing from toe wound where pus was found, suspect that is source  - Lower dentition is exceedingly poor - has to be considered as a source for anaerobes and possibly MRSA in her urine  - rec maxillofacial CT to look for abscess and osteomyelitis - if osteo found then will need transfer to [facility] for OMFS evalaution  - given Veillonella can be associated with endocarditis, rec 2D echo (ordered)  - DC Zosyn and start UNasyn as pseudomonal coverage not needed  - if endocarditis or osteomyelitis found - will need 6 weeks of Vanc and Unasyn from 1st negative culture, if not then 2 weeks of IV abx        UTI (urinary tract infection)    - UA not strongly positive and patient denies any current or recent dysuria though cultures grew MRSA  - MRSA not a normal organism found in urine and is usually seen through hematogenous spread so concern for bacteremia  - will need 2d Echo (ordered)  - patient on vanc currently        Diabetic ulcer of toe of right foot    - Cultures show Prevotella, Veillonella, and Kelsiella   - given cultures growing Veillonella - suspect source of bacteremia  - MRI without drainable fluid collections or osteomyelitis - suspect limited to soft tissues  - rec 2 weeks of abx for cultured organisms  - organisms covered with zosyn but will switch to Unasyn as no pseudomonal growth            Thank you for your consult. I will follow-up with patient. Please contact us if you have any additional questions.    WAQAS Warren  Infectious Disease  [facility]  [place]    Subjective:     Principal Problem: Sepsis    HPI: 48 yo F  with pmhx of fibromyalgia and DM2, in her usual state of health until 5 days prior to admission when she developed a sore throat with productive cough, which resolved after 2 days. Patient then developed myalgias, mainly around her left scapula and left CVA which she describes as a muscle stretching pain, 7 of 10 intensity, constant, associated with subjective fever, chills, generalized body aches, headache, sharp right ear paip, nausea, vomiting (once daily for past 4 days in the morning, nonbloody). No alleviating or aggravating factors. Patient tried ibuprofen for the pain at home which did not help. No chest or abdomian pain, diarrhea, constipation, changes in urinary habits, dysuria, hematuria, vaginal discharge or odors. Not sexually active. Has a ulcer on plantar aspect of right great toe which she says has been there for 2 months and has not noticed any pain, warmth, or discharge from it, although on examination there was obvious purulent discharge once dressing was removed. 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 Denies any recent trauma, sick contacts, travel out of the country.     She was found to have bacteremia with Veillonella parvula.  Urine culture shows MRSA and foot ulcer cultures showed pan sensitive klebsiella, Veillonella parvula, and Prevotella.  She denies any recent fever, chills, sweats, illness, night sweats, or weight loss in the recent past prior to becoming ill.  She reports very bad lower dentition and notices pus pockets in her teeth.  She reports multiple oral infections to the point of even eroding through the skin on her jaw.  Given limited financial resources she has not been able to have the teeth removed and has chronic pain there.  She denies any dysuria, hesitancy, or urgency.     Past Medical History:   Diagnosis Date    Diabetes mellitus        Past Surgical  History:   Procedure Laterality Date     SECTION, CLASSIC      TUBAL LIGATION         Review of patient's allergies indicates:   Allergen Reactions    Bactrim [sulfamethoxazole-trimethoprim]        Medications:  Medications Prior to Admission   Medication Sig    calcium carbonate (TUMS) 200 mg calcium (500 mg) chewable tablet Take 1 tablet by mouth daily as needed for Heartburn.    cyclobenzaprine (FLEXERIL) 10 MG tablet Take 10 mg by mouth 3 (three) times daily.    ergocalciferol (VITAMIN D2) 50,000 unit Cap Take 50,000 Units by mouth every 7 days.    escitalopram oxalate (LEXAPRO) 10 MG tablet Take 10 mg by mouth once daily.    gabapentin (NEURONTIN) 800 MG tablet Take 800 mg by mouth 3 (three) times daily.    insulin glargine (BASAGLAR KWIKPEN U-100 INSULIN) 100 unit/mL (3 mL) InPn pen Inject 10 Units into the skin every evening.    mupirocin (BACTROBAN) 2 % ointment Apply topically daily as needed    vancomycin HCl (VANCOMYCIN, BULK,) 900 mcg/mg (not less than) Powd 1 application by Misc.(Non-Drug; Combo Route) route daily as needed (apply powder to right foot as needed).      Antibiotics (From admission, onward)    Start     Stop Route Frequency Ordered    18 1400  vancomycin in dextrose 5 % 1 gram/250 mL IVPB 1,000 mg  (Vancomycin IVPB with levels panel)      -- IV Every 8 hours 18 0909    18 2300  piperacillin-tazobactam 4.5 g in sodium chloride 0.9% 100 mL IVPB (ready to mix system)      -- IV Every 8 hours (non-standard times) 18 2151        Antifungals (From admission, onward)    None        Antivirals (From admission, onward)    None             There is no immunization history on file for this patient.    Family History     None        Social History     Socioeconomic History    Marital status: Single     Spouse name: None    Number of children: None    Years of education: None    Highest education level: None   Social Needs    Financial resource strain:  None    Food insecurity - worry: None    Food insecurity - inability: None    Transportation needs - medical: None    Transportation needs - non-medical: None   Occupational History    None   Tobacco Use    Smoking status: Current Every Day Smoker     Packs/day: 0.50     Years: 7.00     Pack years: 3.50     Types: Cigarettes    Smokeless tobacco: Never Used   Substance and Sexual Activity    Alcohol use: No    Drug use: No    Sexual activity: Not Currently   Other Topics Concern    None   Social History Narrative    None     Review of Systems   Constitutional: Negative for appetite change, chills, diaphoresis, fatigue, fever and unexpected weight change.   HENT: Positive for dental problem. Negative for congestion, ear pain, hearing loss, sore throat and tinnitus.    Eyes: Negative for pain, redness and visual disturbance.   Respiratory: Negative for cough, chest tightness, shortness of breath and wheezing.    Cardiovascular: Negative for chest pain.   Gastrointestinal: Negative for abdominal pain, constipation, diarrhea, nausea and vomiting.   Endocrine: Negative for cold intolerance and heat intolerance.   Genitourinary: Negative for decreased urine volume, difficulty urinating, dysuria, flank pain, frequency, hematuria and urgency.   Musculoskeletal: Negative for arthralgias, back pain, myalgias and neck pain.   Skin: Negative for rash and wound.   Allergic/Immunologic: Negative for environmental allergies, food allergies and immunocompromised state.   Neurological: Negative for dizziness, facial asymmetry, weakness, light-headedness, numbness and headaches.   Hematological: Negative for adenopathy. Does not bruise/bleed easily.   Psychiatric/Behavioral: Negative for agitation, behavioral problems and confusion.     Objective:     Vital Signs (Most Recent):  Temp: 96.4 °F (35.8 °C) (09/07/18 0700)  Pulse: 79 (09/07/18 0700)  Resp: 16 (09/07/18 0700)  BP: (!) 144/90 (09/07/18 0700)  SpO2: 95 %  (09/07/18 0700) Vital Signs (24h Range):  Temp:  [96.4 °F (35.8 °C)-98.4 °F (36.9 °C)] 96.4 °F (35.8 °C)  Pulse:  [79-89] 79  Resp:  [16-18] 16  SpO2:  [95 %-99 %] 95 %  BP: (130-145)/(65-91) 144/90     Weight: 62.6 kg (138 lb)  Body mass index is 24.45 kg/m².    Estimated Creatinine Clearance: 71.1 mL/min (based on SCr of 0.8 mg/dL).    Physical Exam   Constitutional: She is oriented to person, place, and time. She appears well-developed and well-nourished. No distress.       HENT:   Head: Normocephalic and atraumatic.   Mouth/Throat: Uvula is midline, oropharynx is clear and moist and mucous membranes are normal. She does not have dentures. No oral lesions. Abnormal dentition (all upper teeth missing). Dental caries (multiple cavitary and eroded teeth lower.) present. No dental abscesses or lacerations.   Eyes: EOM are normal. Pupils are equal, round, and reactive to light. No scleral icterus.   Cardiovascular: Normal rate, regular rhythm and normal heart sounds. Exam reveals no gallop and no friction rub.   No murmur heard.  Pulmonary/Chest: Effort normal and breath sounds normal. No respiratory distress. She has no wheezes. She has no rales.   Abdominal: Soft. Bowel sounds are normal. She exhibits no distension and no mass. There is no hepatosplenomegaly. There is no tenderness. There is no rebound and no guarding.   Musculoskeletal: She exhibits no edema.   Neurological: She is alert and oriented to person, place, and time.   Skin: Skin is warm, dry and intact. No rash noted.   Psychiatric: She has a normal mood and affect. Her behavior is normal.       Significant Labs:   Blood Culture:   Recent Labs   Lab  09/02/18   1921  09/04/18   0807   LABBLOO  Gram stain oscar bottle: Gram negative cocci  Results called to and read back by: Kalie Bradley RN  09/04/2018  07:03  VEILLONELLA PARVULA  Gram stain oscar bottle: Gram Negative Cocci  Results called to and read back by: Kalie Bradley RN  09/04/2018  07:02   VEILLONELLA PARVULA  No Growth to date  No Growth to date  No Growth to date  No Growth to date  No Growth to date  No Growth to date     CBC:   Recent Labs   Lab  09/06/18   0435  09/07/18   0512   WBC  5.18  7.79   HGB  11.2*  11.2*   HCT  32.7*  33.9*   PLT  202  220     CMP:   Recent Labs   Lab  09/06/18   0435  09/07/18   0512   NA  136  135*   K  4.0  3.9   CL  99  101   CO2  29  26   GLU  326*  395*   BUN  15  16   CREATININE  0.8  0.8   CALCIUM  9.1  9.0   PROT  6.3  6.2   ALBUMIN  2.6*  2.6*   BILITOT  0.3  0.3   ALKPHOS  200*  195*   AST  32  57*   ALT  47*  66*   ANIONGAP  8  8   EGFRNONAA  >60.0  >60.0     Wound Culture:   Recent Labs   Lab  09/02/18   2344   LABAERO  KLEBSIELLA PNEUMONIAE  Many       All pertinent labs within the past 24 hours have been reviewed.    Significant Imaging: I have reviewed all pertinent imaging results/findings within the past 24 hours.   US Lower Extrem Arteries Bilat with ANSON (xpd) [[id number]] Resulted: 09/04/18 1208   Order Status: Completed Updated: 09/04/18 1210   Narrative:     EXAMINATION:  US ARTERIAL LOWER EXTREMITY BILAT WITH ANSON (XPD)    CLINICAL HISTORY:  Toe wound;    TECHNIQUE:  Bilateral lower extremity arterial duplex ultrasound examination performed. Multiple gray scale and color doppler images were obtained in addition to waveform analysis.  Ankle-brachial indices were calculated.    COMPARISON:  No applicable prior imaging available for comparison.    FINDINGS:  The ankle brachial index on the right is 1.1 and on the left is 1.1.    The peak systolic velocities on the right are as follows, in centimeters/second:    Common femoral artery: 117    Superficial femoral artery, proximal: 80    Superficial femoral artery, mid portion: 88    Superficial femoral artery, distal: 82    Popliteal artery: 75    Posterior tibial artery: 72    Anterior tibial artery: 47    The peak systolic velocities on the left are as follows, in centimeters/second:    Common  femoral artery: 114    Superficial femoral artery, proximal: 106    Superficial femoral artery, mid portion: 96    Superficial femoral artery, distal: 75    Popliteal artery: 62    Posterior tibial artery: 75    Anterior tibial artery: 67    Normal triphasic arterial waveforms are demonstrated.   Impression:       Normal ankle brachial indices of 1.1 on the right and 1.1 on the left.    No hemodynamically significant stenosis demonstrated in the right or left lower extremity arterial system.    Electronically signed by resident: Kenn Dick MD  Date: 09/04/2018  Time: 11:44    Electronically signed by: Chilango Flower MD  Date: 09/04/2018  Time: 12:08   MRI Foot Toes W WO Contrast Right [[id number]] Resulted: 09/03/18 1647   Order Status: Completed Updated: 09/03/18 1649   Narrative:     EXAMINATION:  MRI FOOT TOES W WO CONTRAST RIGHT    CLINICAL HISTORY:  r/o osteomyelitis - diabetic foot ulcer right great toe.;    TECHNIQUE:  Multiplanar, multisequence MR imaging of the right forefoot before and after the administration of 6 cc of gadolinium based intravenous contrast.    COMPARISON:  No relevant prior.    FINDINGS:  There is a small soft tissue defect in the skin of the plantar right 1st great toe, likely representing the patient's known diabetic foot ulcer.  There is mildly increased stir signal and minimal postcontrast enhancement of the subjacent soft tissues, compatible with edema.  No drainable fluid collection identified.  The underlying 1st digit appears intact without any abnormal signal or postcontrast enhancement to suggest osteomyelitis.  Remaining soft tissue structures of the foot are unremarkable.   Impression:       Right 1st great toe ulcer with subjacent edema.  No evidence of acute osteomyelitis or drainable fluid collection.    Electronically signed by resident: Malachi Sands  Date: 09/03/2018  Time: 16:28    Electronically signed by: Mónica Jasso MD  Date: 09/03/2018  Time: 16:47   X-Ray  Chest PA And Lateral [[id number]] Resulted: 09/02/18 1956   Order Status: Completed Updated: 09/02/18 1958   Narrative:     EXAMINATION:  XR CHEST PA AND LATERAL    CLINICAL HISTORY:  Cough    TECHNIQUE:  PA and lateral views of the chest were performed.    COMPARISON:  No relevant, available prior.    FINDINGS:  Cardiomediastinal silhouette is unremarkable.  Trachea is midline.  Lungs are equally well expanded.  No large consolidative opacities.  No large pleural effusion.  No pneumothorax.  Pulmonary vascular pattern is unremarkable.  Upper abdomen shows no significant abnormality.  Osseous structures are intact.   Impression:       No acute radiographic abnormality to explain the patient's cough.    Electronically signed by resident: Malachi Sands  Date: 09/02/2018  Time: 19:50    Electronically signed by: Corky Abdi MD  Date: 09/02/2018  Time: 19:56

## 2018-09-07 NOTE — ASSESSMENT & PLAN NOTE
- BCXs 9/2 show Veillonella parvula - given is growing from toe wound where pus was found, suspect that is source  - Lower dentition is exceedingly poor - has to be considered as a source for anaerobes and possibly MRSA in her urine  - rec maxillofacial CT to look for abscess and osteomyelitis - if osteo found then will need transfer to [facility] for OMFS evalaution  - given Veillonella can be associated with endocarditis, rec 2D echo (ordered)  - DC Zosyn and start UNasyn as pseudomonal coverage not needed  - if endocarditis or osteomyelitis found - will need 6 weeks of Vanc and Unasyn from 1st negative culture, if not then 2 weeks of IV abx

## 2018-09-07 NOTE — ASSESSMENT & PLAN NOTE
- rocephin given in ED for possible UTI   - urine culture showed MRSA, no symptoms or signs of UTI.  - continue on broad spectrum Abx

## 2018-09-07 NOTE — PLAN OF CARE
Pt not medically ready for discharge        09/07/18 2302   Discharge Reassessment   Assessment Type Discharge Planning Reassessment   Provided patient/caregiver education on the expected discharge date and the discharge plan Yes   Do you have any problems affording any of your prescribed medications? No   Discharge Plan A Home with family   Discharge Plan B Home with family

## 2018-09-07 NOTE — CONSULTS
Brief ID Staff Consult    Cc- severe mouth pain    HPI: 47F with T2DM and h/o recurrent MRSA abscesses admitted with fevers and severe mouth/tooth pain reporting recurrent abscesses in her mouth. Has been only able to afford getting $500 of dental work per year and so is slowing getting teeth pulled outpatient. Notes recent presence of several abscessess in mouth, which occasionally pop and her mouth fills with pus. Also has a chronic foot wound, which she denies recent drainage or issues with.     Full ROS negative except as stated    PMHx:   IDDM  Recurrent MRSA abscesses  Dental caries    PSHx:   /tubal ligation    A:   Bactrim    Home meds:  vanc powder to foot  Vitamin d  lexapro  Gabapentin  lantus    Fhx: noncontributory    Vitals:    18 1718   BP: 137/81   Pulse: 84   Resp: 16   Temp: 97.6 °F (36.4 °C)   GEN:thin WF in NAD  HEENT: nearly every tooth broken and necrotic with several small areas of swelling c/f abscess  CV: RRR  PULM: CTAB  ABD: NTND BS+  EXT: no c/c/e; foot ulcer clean. No drainage.     A/P: 47F with T2DM and h/o recurrent MRSA abscesses admitted with fevers and severe mouth/tooth pain reporting recurrent abscesses in her mouth. 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 Suspect source of both of these infections is mouth. Her foot ulcer looks well healed on exam. Veillonella is a rare gram negative anerobic cocci. It is part of oral jose e and is capable of causing severe infections such as endocarditis, osteomyelitis. Very little data exists on treatment. There have been cases of penicillin, vanc, cipro, tetracycline resistance. Need to f/u antibiotic sensitivies. Susceptibility studies suggest that chloramphenicol, clindamycin, and metronidazole have the greatest activity against Veillonella.     # Veillonella bacteremia with suspected maxillary  osteomyelitis/abscess-- recommend imaging of mouth (CT max/face or MRI if able) to evaluate for suspected osteomyelitis/abscess. Her teeth is the source of bacteremia and any foci of infection will need to be drained. Of note, this may necessitate a transfer for OMFS evaluation. Check ESR, CRP. 2D echo.    # MRSA in urine 2/2 presumed MRSA bacteremia-- f/u blood cultures. Check 2d echo to r/o endocarditis. If negative endocarditis, vancomycin IV x 14 days (goal trough 15-20) presuming blood cultures do not turn positive for mrsa.     Check HCV ab for routine screening    Thank you for this interesting consult. Will continue to follow    Yisel Pedraza MD  Infectious Disease

## 2018-09-07 NOTE — ASSESSMENT & PLAN NOTE
Bacteremia  Diabetic foot culture  Urinary tract infection  - presented with 3/4 SIRS. Rapid strep, influenza, HIV, and monospot testing negative.   - etiologies multifactorial including MRSA in urine culture (9/2), gram negative dionicio in blood culture 9/2/18, and klebsiella from right food ulcer 9/2/18  - empirically on vanc and zoysn. Will titrate/descalate based on sensitivities and specificities. vanc dose adjusted, f/u next Vanc trough.  - appreciate further Podiatry recs

## 2018-09-07 NOTE — HPI
48 yo F  with pmhx of fibromyalgia and DM2, in her usual state of health until 5 days prior to admission when she developed a sore throat with productive cough, which resolved after 2 days. Patient then developed myalgias, mainly around her left scapula and left CVA which she describes as a muscle stretching pain, 7 of 10 intensity, constant, associated with subjective fever, chills, generalized body aches, headache, sharp right ear paip, nausea, vomiting (once daily for past 4 days in the morning, nonbloody). No alleviating or aggravating factors. Patient tried ibuprofen for the pain at home which did not help. No chest or abdomian pain, diarrhea, constipation, changes in urinary habits, dysuria, hematuria, vaginal discharge or odors. Not sexually active. Has a ulcer on plantar aspect of right great toe which she says has been there for 2 months and has not noticed any pain, warmth, or discharge from it, although on examination there was obvious purulent discharge once dressing was removed. 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 Denies any recent trauma, sick contacts, travel out of the country.     She was found to have bacteremia with Veillonella parvula.  Urine culture shows MRSA and foot ulcer cultures showed pan sensitive klebsiella, Veillonella parvula, and Prevotella.  She denies any recent fever, chills, sweats, illness, night sweats, or weight loss in the recent past prior to becoming ill.  She reports very bad lower dentition and notices pus pockets in her teeth.  She reports multiple oral infections to the point of even eroding through the skin on her jaw.  Given limited financial resources she has not been able to have the teeth removed and has chronic pain there.  She denies any dysuria, hesitancy, or urgency.

## 2018-09-07 NOTE — PROGRESS NOTES
[facility] Hospital Medicine  Progress Note    Patient Name: Mariah Foley  MRN: [id number]  Patient Class: IP- Inpatient   Admission Date: 2018  Length of Stay: 5 days  Attending Physician: Joe Helton MD  Primary Care Provider: Lukas Stein MD    [facility] Medicine Team: [facility] HOSP MED 5 Chiquita Mak MD    Subjective:     Principal Problem:Sepsis    HPI:  48 yo F  with pmhx of fibromyalgia and DM2, in her usual state of health until 5 days prior to admission when she developed a sore throat with productive cough, which resolved after 2 days. Patient then developed myalgias, mainly around her left scapula and left CVA which she describes as a muscle stretching pain, 7 of 10 intensity, constant, associated with subjective fever, chills, generalized body aches, headache, sharp right ear paip, nausea, vomiting (once daily for past 4 days in the morning, nonbloody). No alleviating or aggravating factors. Patient tried ibuprofen for the pain at home which did not help. No chest or abdomian pain, diarrhea, constipation, changes in urinary habits, dysuria, hematuria, vaginal discharge or odors. Not sexually active. Has a ulcer on plantar aspect of right great toe which she says has been there for 2 months and has not noticed any pain, warmth, or discharge from it, although on examination there was obvious purulent discharge once dressing was removed. 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 Denies any recent trauma, sick contacts, travel out of the country.     Hospital Course:  9/3- Patient seen today and she is vitally stable except of low BP 97/54. Waiting for the MRI result of the R great toe. Continue on Vanc and Zosyn and f/u with Vanc trough tomorrow AM.  - NAEON, patient seen today and she is vitally stable. MRI of R great toe planter ulcer showed no evidence of osteomyelitis or drainable fluid collection. Wound culture showed gram negative  rods lactose . Urine culture showed staph aureus. Blood culture showed gram negative cocci. Repeat blood culture and continue on broad spectrum Abx (Vanc and Zosyn). Podiatry is following and debridment was performed today. Patient has productive cough, headache and congestion. Cetirizine and Fluticasone nasal spray was ordered. Insulin dose adjusted (Detemir 10U BID and Aspart 5U TID WM).  9/5 - no acute events. Awaiting further speciation  9/6- NAEON, patient seen today and she is vitally stable. Continue on Vanc and Zosyn and f/u with Vanc trough tomorrow AM. Adjust insulin regimen (detemir 15U BID, aspart 8U TID and continue on low dose ISS), f/u Podiatry recs.  9/7- NAEON, patient seen today and she is vitally stable. Continue on Vanc and Zosyn and f/u with Vanc trough. Adjust insulin regimen to (detemir 20U BID and aspart 10U TID). F/u Podiatry recs.    Interval History: No acute events overnight. Vitals within normal.     Review of Systems   Constitutional: Negative for appetite change, chills, diaphoresis, fatigue and fever.   HENT: Negative for congestion, ear pain, mouth sores, nosebleeds, postnasal drip, rhinorrhea, sneezing, sore throat, tinnitus and trouble swallowing.    Eyes: Negative for photophobia, pain, discharge and visual disturbance.   Respiratory: Positive for cough. Negative for choking, chest tightness, shortness of breath, wheezing and stridor.    Cardiovascular: Negative for chest pain, palpitations and leg swelling.   Gastrointestinal: Negative for abdominal distention, abdominal pain, anal bleeding, blood in stool, constipation, diarrhea, nausea and vomiting.   Endocrine: Negative for polydipsia, polyphagia and polyuria.   Genitourinary: Negative for difficulty urinating, flank pain, hematuria, pelvic pain, vaginal bleeding, vaginal discharge and vaginal pain.   Musculoskeletal: Positive for back pain and myalgias. Negative for arthralgias, joint swelling, neck pain and neck  stiffness.   Skin: Positive for wound. Negative for rash.   Allergic/Immunologic: Negative for immunocompromised state.   Neurological: Negative for dizziness, tremors, facial asymmetry, weakness, light-headedness, numbness and headaches.   Hematological: Negative for adenopathy. Does not bruise/bleed easily.     Objective:     Vital Signs (Most Recent):  Temp: 96.4 °F (35.8 °C) (09/07/18 0700)  Pulse: 79 (09/07/18 0700)  Resp: 16 (09/07/18 0700)  BP: (!) 144/90 (09/07/18 0700)  SpO2: 95 % (09/07/18 0700) Vital Signs (24h Range):  Temp:  [96.4 °F (35.8 °C)-98.4 °F (36.9 °C)] 96.4 °F (35.8 °C)  Pulse:  [79-89] 79  Resp:  [16-18] 16  SpO2:  [95 %-99 %] 95 %  BP: (130-145)/(65-91) 144/90     Weight: 62.6 kg (138 lb)  Body mass index is 24.45 kg/m².    Intake/Output Summary (Last 24 hours) at 9/7/2018 1110  Last data filed at 9/7/2018 0454  Gross per 24 hour   Intake 200 ml   Output --   Net 200 ml      Physical Exam   Constitutional: She is oriented to person, place, and time. She appears well-developed and well-nourished.   She is lying on the bed comfortably with no signs of pain or distress   HENT:   Head: Normocephalic and atraumatic.   Right Ear: External ear normal.   Left Ear: External ear normal.   Mouth/Throat: Oropharynx is clear and moist. No oropharyngeal exudate.   Eyes: Conjunctivae and EOM are normal. Pupils are equal, round, and reactive to light. Right eye exhibits no discharge. Left eye exhibits no discharge. No scleral icterus.   Neck: Normal range of motion. Neck supple. No thyromegaly present.   Cardiovascular: Normal rate, regular rhythm, normal heart sounds and intact distal pulses. Exam reveals no gallop and no friction rub.   No murmur heard.  Pulmonary/Chest: Effort normal and breath sounds normal. No stridor. No respiratory distress. She has no wheezes. She has no rales. She exhibits no tenderness.   Abdominal: Soft. Bowel sounds are normal. She exhibits no distension and no mass. There is no  tenderness. There is no guarding.   Musculoskeletal: Normal range of motion. She exhibits no edema or deformity.   1 cm ulcer on plantar aspect of right great toe, tissue debridement was performed by Podiatry (on 9/4) and lesion was dressed properly.   Lymphadenopathy:     She has no cervical adenopathy.   Neurological: She is alert and oriented to person, place, and time.   Skin: Skin is warm and dry. Capillary refill takes less than 2 seconds. No rash noted. No erythema.     Significant Labs:   CBC:   Recent Labs   Lab  09/06/18 0435 09/07/18   0512   WBC  5.18  7.79   HGB  11.2*  11.2*   HCT  32.7*  33.9*   PLT  202  220     CMP:   Recent Labs   Lab  09/06/18 0435 09/07/18   0512   NA  136  135*   K  4.0  3.9   CL  99  101   CO2  29  26   GLU  326*  395*   BUN  15  16   CREATININE  0.8  0.8   CALCIUM  9.1  9.0   PROT  6.3  6.2   ALBUMIN  2.6*  2.6*   BILITOT  0.3  0.3   ALKPHOS  200*  195*   AST  32  57*   ALT  47*  66*   ANIONGAP  8  8   EGFRNONAA  >60.0  >60.0       Significant Imaging: I have reviewed and interpreted all pertinent imaging results/findings within the past 24 hours.    Assessment/Plan:      * Sepsis    Bacteremia  Diabetic foot culture  Urinary tract infection  - presented with 3/4 SIRS. Rapid strep, influenza, HIV, and monospot testing negative.   - etiologies multifactorial including MRSA in urine culture (9/2), gram negative dionicio in blood culture 9/2/18, and klebsiella from right food ulcer 9/2/18  - empirically on vanc and zoysn. Will titrate/descalate based on sensitivities and specificities. vanc dose adjusted, f/u next Vanc trough.  - appreciate further Podiatry recs        Diabetic ulcer of toe of right foot    - podiatry consulted  - MRI of R foot showed no evidence of osteomyelitis or drainable fluid collection.  - debridement was performed by podiatry (on 9/4) and wound has dressed properly.  - wound culture showed gram negative rods lactose  (Klebsiella)  - Continue on  Vanc and Zosyn, f/u Podiatry recs.        UTI (urinary tract infection)    - rocephin given in ED for possible UTI   - urine culture showed MRSA, no symptoms or signs of UTI.  - continue on broad spectrum Abx         Cough    -CXR unremarkable  -sputum culture if productive cough continues   -will consider coverage for atypical if pt not clinically improving with vanc/zosyn  - she has headache and congestion, Cetirizine was ordered.  -Guaifenesin was ordered.  -symptoms and signs has improved, continue on the same meds.        DM2 (diabetes mellitus, type 2)    -takes insulin at home, patient think it's 10 units at night. Also on glimepiride BID   -currently she is on detemir 20U BID and scheduled aspart 10U TID with ISS  -continue POCT and BGL; adjust regimen as needed        Fibromyalgia    -currently on Duloxetine 30 mg once daily.  -resume gabapentin 300 TID            Tobacco abuse    - for tobacco cessation           Debility    -PT and OT was ordered to treat and evaluate. Appreciate assistance.  - recommending discharge home when medically ready        Leukocytosis      -see plan for sepsis         Bacteremia    - blood culture showed Veillonella Parvula  - continue on Vanc and Zosyn.            VTE Risk Mitigation (From admission, onward)        Ordered     enoxaparin injection 40 mg  Daily      09/03/18 0722     IP VTE LOW RISK PATIENT  Once      09/02/18 2155     Place sequential compression device  Until discontinued      09/02/18 2155     IP VTE LOW RISK PATIENT  Once      09/02/18 2155              Chiquita Mak MD  Department of Hospital Medicine   [facility]

## 2018-09-07 NOTE — NURSING
Pt aaox4, VSS and pain assessed and treated as needed.  No injuries/falls throughout shift. Bed low, siderails up x2, call light within reach. Will continue to monitor.

## 2018-09-07 NOTE — SUBJECTIVE & OBJECTIVE
Past Medical History:   Diagnosis Date    Diabetes mellitus        Past Surgical History:   Procedure Laterality Date     SECTION, CLASSIC      TUBAL LIGATION         Review of patient's allergies indicates:   Allergen Reactions    Bactrim [sulfamethoxazole-trimethoprim]        Medications:  Medications Prior to Admission   Medication Sig    calcium carbonate (TUMS) 200 mg calcium (500 mg) chewable tablet Take 1 tablet by mouth daily as needed for Heartburn.    cyclobenzaprine (FLEXERIL) 10 MG tablet Take 10 mg by mouth 3 (three) times daily.    ergocalciferol (VITAMIN D2) 50,000 unit Cap Take 50,000 Units by mouth every 7 days.    escitalopram oxalate (LEXAPRO) 10 MG tablet Take 10 mg by mouth once daily.    gabapentin (NEURONTIN) 800 MG tablet Take 800 mg by mouth 3 (three) times daily.    insulin glargine (BASAGLAR KWIKPEN U-100 INSULIN) 100 unit/mL (3 mL) InPn pen Inject 10 Units into the skin every evening.    mupirocin (BACTROBAN) 2 % ointment Apply topically daily as needed    vancomycin HCl (VANCOMYCIN, BULK,) 900 mcg/mg (not less than) Powd 1 application by Misc.(Non-Drug; Combo Route) route daily as needed (apply powder to right foot as needed).      Antibiotics (From admission, onward)    Start     Stop Route Frequency Ordered    18 1400  vancomycin in dextrose 5 % 1 gram/250 mL IVPB 1,000 mg  (Vancomycin IVPB with levels panel)      -- IV Every 8 hours 18 0909    18 2300  piperacillin-tazobactam 4.5 g in sodium chloride 0.9% 100 mL IVPB (ready to mix system)      -- IV Every 8 hours (non-standard times) 18 2151        Antifungals (From admission, onward)    None        Antivirals (From admission, onward)    None             There is no immunization history on file for this patient.    Family History     None        Social History     Socioeconomic History    Marital status: Single     Spouse name: None    Number of children: None    Years of education: None     Highest education level: None   Social Needs    Financial resource strain: None    Food insecurity - worry: None    Food insecurity - inability: None    Transportation needs - medical: None    Transportation needs - non-medical: None   Occupational History    None   Tobacco Use    Smoking status: Current Every Day Smoker     Packs/day: 0.50     Years: 7.00     Pack years: 3.50     Types: Cigarettes    Smokeless tobacco: Never Used   Substance and Sexual Activity    Alcohol use: No    Drug use: No    Sexual activity: Not Currently   Other Topics Concern    None   Social History Narrative    None     Review of Systems   Constitutional: Negative for appetite change, chills, diaphoresis, fatigue, fever and unexpected weight change.   HENT: Positive for dental problem. Negative for congestion, ear pain, hearing loss, sore throat and tinnitus.    Eyes: Negative for pain, redness and visual disturbance.   Respiratory: Negative for cough, chest tightness, shortness of breath and wheezing.    Cardiovascular: Negative for chest pain.   Gastrointestinal: Negative for abdominal pain, constipation, diarrhea, nausea and vomiting.   Endocrine: Negative for cold intolerance and heat intolerance.   Genitourinary: Negative for decreased urine volume, difficulty urinating, dysuria, flank pain, frequency, hematuria and urgency.   Musculoskeletal: Negative for arthralgias, back pain, myalgias and neck pain.   Skin: Negative for rash and wound.   Allergic/Immunologic: Negative for environmental allergies, food allergies and immunocompromised state.   Neurological: Negative for dizziness, facial asymmetry, weakness, light-headedness, numbness and headaches.   Hematological: Negative for adenopathy. Does not bruise/bleed easily.   Psychiatric/Behavioral: Negative for agitation, behavioral problems and confusion.     Objective:     Vital Signs (Most Recent):  Temp: 96.4 °F (35.8 °C) (09/07/18 0700)  Pulse: 79 (09/07/18  0700)  Resp: 16 (09/07/18 0700)  BP: (!) 144/90 (09/07/18 0700)  SpO2: 95 % (09/07/18 0700) Vital Signs (24h Range):  Temp:  [96.4 °F (35.8 °C)-98.4 °F (36.9 °C)] 96.4 °F (35.8 °C)  Pulse:  [79-89] 79  Resp:  [16-18] 16  SpO2:  [95 %-99 %] 95 %  BP: (130-145)/(65-91) 144/90     Weight: 62.6 kg (138 lb)  Body mass index is 24.45 kg/m².    Estimated Creatinine Clearance: 71.1 mL/min (based on SCr of 0.8 mg/dL).    Physical Exam   Constitutional: She is oriented to person, place, and time. She appears well-developed and well-nourished. No distress.       HENT:   Head: Normocephalic and atraumatic.   Mouth/Throat: Uvula is midline, oropharynx is clear and moist and mucous membranes are normal. She does not have dentures. No oral lesions. Abnormal dentition (all upper teeth missing). Dental caries (multiple cavitary and eroded teeth lower.) present. No dental abscesses or lacerations.   Eyes: EOM are normal. Pupils are equal, round, and reactive to light. No scleral icterus.   Cardiovascular: Normal rate, regular rhythm and normal heart sounds. Exam reveals no gallop and no friction rub.   No murmur heard.  Pulmonary/Chest: Effort normal and breath sounds normal. No respiratory distress. She has no wheezes. She has no rales.   Abdominal: Soft. Bowel sounds are normal. She exhibits no distension and no mass. There is no hepatosplenomegaly. There is no tenderness. There is no rebound and no guarding.   Musculoskeletal: She exhibits no edema.   Neurological: She is alert and oriented to person, place, and time.   Skin: Skin is warm, dry and intact. No rash noted.   Psychiatric: She has a normal mood and affect. Her behavior is normal.       Significant Labs:   Blood Culture:   Recent Labs   Lab  09/02/18   1921  09/04/18   0807   LABBLOO  Gram stain oscar bottle: Gram negative cocci  Results called to and read back by: Kalie Bradley RN  09/04/2018  07:03  VEILLONELLA PARVULA  Gram stain oscar bottle: Gram Negative Cocci   Results called to and read back by: Kalie Bradley RN  09/04/2018  07:02  VEILLONELLA PARVULA  No Growth to date  No Growth to date  No Growth to date  No Growth to date  No Growth to date  No Growth to date     CBC:   Recent Labs   Lab  09/06/18 0435  09/07/18   0512   WBC  5.18  7.79   HGB  11.2*  11.2*   HCT  32.7*  33.9*   PLT  202  220     CMP:   Recent Labs   Lab  09/06/18 0435  09/07/18   0512   NA  136  135*   K  4.0  3.9   CL  99  101   CO2  29  26   GLU  326*  395*   BUN  15  16   CREATININE  0.8  0.8   CALCIUM  9.1  9.0   PROT  6.3  6.2   ALBUMIN  2.6*  2.6*   BILITOT  0.3  0.3   ALKPHOS  200*  195*   AST  32  57*   ALT  47*  66*   ANIONGAP  8  8   EGFRNONAA  >60.0  >60.0     Wound Culture:   Recent Labs   Lab  09/02/18   2344   LABAERO  KLEBSIELLA PNEUMONIAE  Many       All pertinent labs within the past 24 hours have been reviewed.    Significant Imaging: I have reviewed all pertinent imaging results/findings within the past 24 hours.   US Lower Extrem Arteries Bilat with ANSON (xpd) [[id number]] Resulted: 09/04/18 1208   Order Status: Completed Updated: 09/04/18 1210   Narrative:     EXAMINATION:  US ARTERIAL LOWER EXTREMITY BILAT WITH ANSON (XPD)    CLINICAL HISTORY:  Toe wound;    TECHNIQUE:  Bilateral lower extremity arterial duplex ultrasound examination performed. Multiple gray scale and color doppler images were obtained in addition to waveform analysis.  Ankle-brachial indices were calculated.    COMPARISON:  No applicable prior imaging available for comparison.    FINDINGS:  The ankle brachial index on the right is 1.1 and on the left is 1.1.    The peak systolic velocities on the right are as follows, in centimeters/second:    Common femoral artery: 117    Superficial femoral artery, proximal: 80    Superficial femoral artery, mid portion: 88    Superficial femoral artery, distal: 82    Popliteal artery: 75    Posterior tibial artery: 72    Anterior tibial artery: 47    The peak systolic  velocities on the left are as follows, in centimeters/second:    Common femoral artery: 114    Superficial femoral artery, proximal: 106    Superficial femoral artery, mid portion: 96    Superficial femoral artery, distal: 75    Popliteal artery: 62    Posterior tibial artery: 75    Anterior tibial artery: 67    Normal triphasic arterial waveforms are demonstrated.   Impression:       Normal ankle brachial indices of 1.1 on the right and 1.1 on the left.    No hemodynamically significant stenosis demonstrated in the right or left lower extremity arterial system.    Electronically signed by resident: Kenn Dick MD  Date: 09/04/2018  Time: 11:44    Electronically signed by: Chilango Flower MD  Date: 09/04/2018  Time: 12:08   MRI Foot Toes W WO Contrast Right [[id number]] Resulted: 09/03/18 1647   Order Status: Completed Updated: 09/03/18 1649   Narrative:     EXAMINATION:  MRI FOOT TOES W WO CONTRAST RIGHT    CLINICAL HISTORY:  r/o osteomyelitis - diabetic foot ulcer right great toe.;    TECHNIQUE:  Multiplanar, multisequence MR imaging of the right forefoot before and after the administration of 6 cc of gadolinium based intravenous contrast.    COMPARISON:  No relevant prior.    FINDINGS:  There is a small soft tissue defect in the skin of the plantar right 1st great toe, likely representing the patient's known diabetic foot ulcer.  There is mildly increased stir signal and minimal postcontrast enhancement of the subjacent soft tissues, compatible with edema.  No drainable fluid collection identified.  The underlying 1st digit appears intact without any abnormal signal or postcontrast enhancement to suggest osteomyelitis.  Remaining soft tissue structures of the foot are unremarkable.   Impression:       Right 1st great toe ulcer with subjacent edema.  No evidence of acute osteomyelitis or drainable fluid collection.    Electronically signed by resident: Malachi Sands  Date: 09/03/2018  Time:  16:28    Electronically signed by: Mónica Jasso MD  Date: 09/03/2018  Time: 16:47   X-Ray Chest PA And Lateral [[id number]] Resulted: 09/02/18 1956   Order Status: Completed Updated: 09/02/18 1958   Narrative:     EXAMINATION:  XR CHEST PA AND LATERAL    CLINICAL HISTORY:  Cough    TECHNIQUE:  PA and lateral views of the chest were performed.    COMPARISON:  No relevant, available prior.    FINDINGS:  Cardiomediastinal silhouette is unremarkable.  Trachea is midline.  Lungs are equally well expanded.  No large consolidative opacities.  No large pleural effusion.  No pneumothorax.  Pulmonary vascular pattern is unremarkable.  Upper abdomen shows no significant abnormality.  Osseous structures are intact.   Impression:       No acute radiographic abnormality to explain the patient's cough.    Electronically signed by resident: Malachi Sands  Date: 09/02/2018  Time: 19:50    Electronically signed by: Corky Abdi MD  Date: 09/02/2018  Time: 19:56

## 2018-09-07 NOTE — SUBJECTIVE & OBJECTIVE
Interval History: No acute events overnight. Vitals within normal.     Review of Systems   Constitutional: Negative for appetite change, chills, diaphoresis, fatigue and fever.   HENT: Negative for congestion, ear pain, mouth sores, nosebleeds, postnasal drip, rhinorrhea, sneezing, sore throat, tinnitus and trouble swallowing.    Eyes: Negative for photophobia, pain, discharge and visual disturbance.   Respiratory: Positive for cough. Negative for choking, chest tightness, shortness of breath, wheezing and stridor.    Cardiovascular: Negative for chest pain, palpitations and leg swelling.   Gastrointestinal: Negative for abdominal distention, abdominal pain, anal bleeding, blood in stool, constipation, diarrhea, nausea and vomiting.   Endocrine: Negative for polydipsia, polyphagia and polyuria.   Genitourinary: Negative for difficulty urinating, flank pain, hematuria, pelvic pain, vaginal bleeding, vaginal discharge and vaginal pain.   Musculoskeletal: Positive for back pain and myalgias. Negative for arthralgias, joint swelling, neck pain and neck stiffness.   Skin: Positive for wound. Negative for rash.   Allergic/Immunologic: Negative for immunocompromised state.   Neurological: Negative for dizziness, tremors, facial asymmetry, weakness, light-headedness, numbness and headaches.   Hematological: Negative for adenopathy. Does not bruise/bleed easily.     Objective:     Vital Signs (Most Recent):  Temp: 96.4 °F (35.8 °C) (09/07/18 0700)  Pulse: 79 (09/07/18 0700)  Resp: 16 (09/07/18 0700)  BP: (!) 144/90 (09/07/18 0700)  SpO2: 95 % (09/07/18 0700) Vital Signs (24h Range):  Temp:  [96.4 °F (35.8 °C)-98.4 °F (36.9 °C)] 96.4 °F (35.8 °C)  Pulse:  [79-89] 79  Resp:  [16-18] 16  SpO2:  [95 %-99 %] 95 %  BP: (130-145)/(65-91) 144/90     Weight: 62.6 kg (138 lb)  Body mass index is 24.45 kg/m².    Intake/Output Summary (Last 24 hours) at 9/7/2018 1110  Last data filed at 9/7/2018 0454  Gross per 24 hour   Intake 200 ml    Output --   Net 200 ml      Physical Exam   Constitutional: She is oriented to person, place, and time. She appears well-developed and well-nourished.   She is lying on the bed comfortably with no signs of pain or distress   HENT:   Head: Normocephalic and atraumatic.   Right Ear: External ear normal.   Left Ear: External ear normal.   Mouth/Throat: Oropharynx is clear and moist. No oropharyngeal exudate.   Eyes: Conjunctivae and EOM are normal. Pupils are equal, round, and reactive to light. Right eye exhibits no discharge. Left eye exhibits no discharge. No scleral icterus.   Neck: Normal range of motion. Neck supple. No thyromegaly present.   Cardiovascular: Normal rate, regular rhythm, normal heart sounds and intact distal pulses. Exam reveals no gallop and no friction rub.   No murmur heard.  Pulmonary/Chest: Effort normal and breath sounds normal. No stridor. No respiratory distress. She has no wheezes. She has no rales. She exhibits no tenderness.   Abdominal: Soft. Bowel sounds are normal. She exhibits no distension and no mass. There is no tenderness. There is no guarding.   Musculoskeletal: Normal range of motion. She exhibits no edema or deformity.   1 cm ulcer on plantar aspect of right great toe, tissue debridement was performed by Podiatry (on 9/4) and lesion was dressed properly.   Lymphadenopathy:     She has no cervical adenopathy.   Neurological: She is alert and oriented to person, place, and time.   Skin: Skin is warm and dry. Capillary refill takes less than 2 seconds. No rash noted. No erythema.     Significant Labs:   CBC:   Recent Labs   Lab  09/06/18   0435  09/07/18   0512   WBC  5.18  7.79   HGB  11.2*  11.2*   HCT  32.7*  33.9*   PLT  202  220     CMP:   Recent Labs   Lab  09/06/18   0435  09/07/18   0512   NA  136  135*   K  4.0  3.9   CL  99  101   CO2  29  26   GLU  326*  395*   BUN  15  16   CREATININE  0.8  0.8   CALCIUM  9.1  9.0   PROT  6.3  6.2   ALBUMIN  2.6*  2.6*   BILITOT   0.3  0.3   ALKPHOS  200*  195*   AST  32  57*   ALT  47*  66*   ANIONGAP  8  8   EGFRNONAA  >60.0  >60.0       Significant Imaging: I have reviewed and interpreted all pertinent imaging results/findings within the past 24 hours.

## 2018-09-07 NOTE — ASSESSMENT & PLAN NOTE
- Cultures show Prevotella, Veillonella, and Kelsiella   - given cultures growing Veillonella - suspect source of bacteremia  - MRI without drainable fluid collections or osteomyelitis - suspect limited to soft tissues  - rec 2 weeks of abx for cultured organisms  - organisms covered with zosyn but will switch to Unasyn as no pseudomonal growth

## 2018-09-07 NOTE — ASSESSMENT & PLAN NOTE
-takes insulin at home, patient think it's 10 units at night. Also on glimepiride BID   -currently she is on detemir 20U BID and scheduled aspart 10U TID with ISS  -continue POCT and BGL; adjust regimen as needed

## 2018-09-07 NOTE — ASSESSMENT & PLAN NOTE
- UA not strongly positive and patient denies any current or recent dysuria though cultures grew MRSA  - MRSA not a normal organism found in urine and is usually seen through hematogenous spread so concern for bacteremia  - will need 2d Echo (ordered)  - patient on vanc currently

## 2018-09-08 PROBLEM — S36.119A LIVER INJURY: Status: ACTIVE | Noted: 2018-09-08

## 2018-09-08 LAB
ALBUMIN SERPL BCP-MCNC: 2.9 G/DL
ALP SERPL-CCNC: 194 U/L
ALT SERPL W/O P-5'-P-CCNC: 101 U/L
ANION GAP SERPL CALC-SCNC: 9 MMOL/L
ANISOCYTOSIS BLD QL SMEAR: SLIGHT
AST SERPL-CCNC: 90 U/L
BASOPHILS NFR BLD: 2 %
BILIRUB SERPL-MCNC: 0.2 MG/DL
BUN SERPL-MCNC: 15 MG/DL
CALCIUM SERPL-MCNC: 9.2 MG/DL
CHLORIDE SERPL-SCNC: 99 MMOL/L
CO2 SERPL-SCNC: 27 MMOL/L
CREAT SERPL-MCNC: 0.8 MG/DL
CRP SERPL-MCNC: 16.7 MG/L
DIFFERENTIAL METHOD: ABNORMAL
EOSINOPHIL NFR BLD: 1 %
ERYTHROCYTE [DISTWIDTH] IN BLOOD BY AUTOMATED COUNT: 12.5 %
ERYTHROCYTE [SEDIMENTATION RATE] IN BLOOD BY WESTERGREN METHOD: 65 MM/HR
EST. GFR  (AFRICAN AMERICAN): >60 ML/MIN/1.73 M^2
EST. GFR  (NON AFRICAN AMERICAN): >60 ML/MIN/1.73 M^2
GLUCOSE SERPL-MCNC: 322 MG/DL
HCT VFR BLD AUTO: 34.4 %
HGB BLD-MCNC: 11.2 G/DL
IMM GRANULOCYTES # BLD AUTO: ABNORMAL K/UL
IMM GRANULOCYTES NFR BLD AUTO: ABNORMAL %
LYMPHOCYTES NFR BLD: 33 %
MAGNESIUM SERPL-MCNC: 1.8 MG/DL
MCH RBC QN AUTO: 26.7 PG
MCHC RBC AUTO-ENTMCNC: 32.6 G/DL
MCV RBC AUTO: 82 FL
METAMYELOCYTES NFR BLD MANUAL: 1 %
MONOCYTES NFR BLD: 12 %
NEUTROPHILS NFR BLD: 50 %
NEUTS BAND NFR BLD MANUAL: 1 %
NRBC BLD-RTO: 0 /100 WBC
OVALOCYTES BLD QL SMEAR: ABNORMAL
PHOSPHATE SERPL-MCNC: 3.7 MG/DL
PLATELET # BLD AUTO: 275 K/UL
PMV BLD AUTO: 10.7 FL
POCT GLUCOSE: 114 MG/DL (ref 70–110)
POCT GLUCOSE: 122 MG/DL (ref 70–110)
POCT GLUCOSE: 243 MG/DL (ref 70–110)
POCT GLUCOSE: 358 MG/DL (ref 70–110)
POIKILOCYTOSIS BLD QL SMEAR: SLIGHT
POLYCHROMASIA BLD QL SMEAR: ABNORMAL
POTASSIUM SERPL-SCNC: 3.7 MMOL/L
PROT SERPL-MCNC: 6.7 G/DL
RBC # BLD AUTO: 4.2 M/UL
SODIUM SERPL-SCNC: 135 MMOL/L
WBC # BLD AUTO: 11.08 K/UL

## 2018-09-08 PROCEDURE — 86140 C-REACTIVE PROTEIN: CPT

## 2018-09-08 PROCEDURE — 63600175 PHARM REV CODE 636 W HCPCS: Performed by: STUDENT IN AN ORGANIZED HEALTH CARE EDUCATION/TRAINING PROGRAM

## 2018-09-08 PROCEDURE — 11000001 HC ACUTE MED/SURG PRIVATE ROOM

## 2018-09-08 PROCEDURE — 25000242 PHARM REV CODE 250 ALT 637 W/ HCPCS: Performed by: STUDENT IN AN ORGANIZED HEALTH CARE EDUCATION/TRAINING PROGRAM

## 2018-09-08 PROCEDURE — 84100 ASSAY OF PHOSPHORUS: CPT

## 2018-09-08 PROCEDURE — 36415 COLL VENOUS BLD VENIPUNCTURE: CPT

## 2018-09-08 PROCEDURE — 25000003 PHARM REV CODE 250: Performed by: STUDENT IN AN ORGANIZED HEALTH CARE EDUCATION/TRAINING PROGRAM

## 2018-09-08 PROCEDURE — 83735 ASSAY OF MAGNESIUM: CPT

## 2018-09-08 PROCEDURE — 85007 BL SMEAR W/DIFF WBC COUNT: CPT

## 2018-09-08 PROCEDURE — 85652 RBC SED RATE AUTOMATED: CPT

## 2018-09-08 PROCEDURE — 25500020 PHARM REV CODE 255: Performed by: STUDENT IN AN ORGANIZED HEALTH CARE EDUCATION/TRAINING PROGRAM

## 2018-09-08 PROCEDURE — 63600175 PHARM REV CODE 636 W HCPCS: Performed by: PHYSICIAN ASSISTANT

## 2018-09-08 PROCEDURE — 86803 HEPATITIS C AB TEST: CPT

## 2018-09-08 PROCEDURE — 80053 COMPREHEN METABOLIC PANEL: CPT

## 2018-09-08 PROCEDURE — 85027 COMPLETE CBC AUTOMATED: CPT

## 2018-09-08 PROCEDURE — 99233 SBSQ HOSP IP/OBS HIGH 50: CPT | Mod: ,,, | Performed by: STUDENT IN AN ORGANIZED HEALTH CARE EDUCATION/TRAINING PROGRAM

## 2018-09-08 PROCEDURE — 99233 SBSQ HOSP IP/OBS HIGH 50: CPT | Mod: ,,, | Performed by: PHYSICIAN ASSISTANT

## 2018-09-08 PROCEDURE — 25000003 PHARM REV CODE 250: Performed by: PHYSICIAN ASSISTANT

## 2018-09-08 RX ORDER — DULOXETIN HYDROCHLORIDE 20 MG/1
20 CAPSULE, DELAYED RELEASE ORAL DAILY
Status: DISCONTINUED | OUTPATIENT
Start: 2018-09-09 | End: 2018-09-13 | Stop reason: HOSPADM

## 2018-09-08 RX ORDER — INSULIN ASPART 100 [IU]/ML
15 INJECTION, SOLUTION INTRAVENOUS; SUBCUTANEOUS
Status: DISCONTINUED | OUTPATIENT
Start: 2018-09-08 | End: 2018-09-11

## 2018-09-08 RX ADMIN — SENNOSIDES AND DOCUSATE SODIUM 2 TABLET: 8.6; 5 TABLET ORAL at 09:09

## 2018-09-08 RX ADMIN — AMPICILLIN AND SULBACTAM 3 G: 2; 1 INJECTION, POWDER, FOR SOLUTION INTRAVENOUS at 10:09

## 2018-09-08 RX ADMIN — GABAPENTIN 300 MG: 300 CAPSULE ORAL at 08:09

## 2018-09-08 RX ADMIN — POLYETHYLENE GLYCOL 3350 17 G: 17 POWDER, FOR SOLUTION ORAL at 09:09

## 2018-09-08 RX ADMIN — DULOXETINE HYDROCHLORIDE 30 MG: 30 CAPSULE, DELAYED RELEASE ORAL at 08:09

## 2018-09-08 RX ADMIN — ENOXAPARIN SODIUM 40 MG: 100 INJECTION SUBCUTANEOUS at 05:09

## 2018-09-08 RX ADMIN — FLUTICASONE PROPIONATE 50 MCG: 50 SPRAY, METERED NASAL at 12:09

## 2018-09-08 RX ADMIN — IOHEXOL 75 ML: 350 INJECTION, SOLUTION INTRAVENOUS at 06:09

## 2018-09-08 RX ADMIN — VANCOMYCIN 1000 MG: 1 INJECTION, SOLUTION INTRAVENOUS at 09:09

## 2018-09-08 RX ADMIN — VANCOMYCIN 1000 MG: 1 INJECTION, SOLUTION INTRAVENOUS at 06:09

## 2018-09-08 RX ADMIN — INSULIN ASPART 3 UNITS: 100 INJECTION, SOLUTION INTRAVENOUS; SUBCUTANEOUS at 12:09

## 2018-09-08 RX ADMIN — HYDROCODONE BITARTRATE AND ACETAMINOPHEN 1 TABLET: 7.5; 325 TABLET ORAL at 12:09

## 2018-09-08 RX ADMIN — GABAPENTIN 300 MG: 300 CAPSULE ORAL at 02:09

## 2018-09-08 RX ADMIN — AMPICILLIN AND SULBACTAM 3 G: 2; 1 INJECTION, POWDER, FOR SOLUTION INTRAVENOUS at 01:09

## 2018-09-08 RX ADMIN — KETOROLAC TROMETHAMINE 10 MG: 10 TABLET, FILM COATED ORAL at 09:09

## 2018-09-08 RX ADMIN — INSULIN ASPART 10 UNITS: 100 INJECTION, SOLUTION INTRAVENOUS; SUBCUTANEOUS at 08:09

## 2018-09-08 RX ADMIN — VANCOMYCIN 1000 MG: 1 INJECTION, SOLUTION INTRAVENOUS at 02:09

## 2018-09-08 RX ADMIN — INSULIN ASPART 15 UNITS: 100 INJECTION, SOLUTION INTRAVENOUS; SUBCUTANEOUS at 09:09

## 2018-09-08 RX ADMIN — CETIRIZINE HYDROCHLORIDE 10 MG: 5 TABLET, FILM COATED ORAL at 08:09

## 2018-09-08 RX ADMIN — INSULIN ASPART 2 UNITS: 100 INJECTION, SOLUTION INTRAVENOUS; SUBCUTANEOUS at 08:09

## 2018-09-08 RX ADMIN — HYDROCODONE BITARTRATE AND ACETAMINOPHEN 1 TABLET: 7.5; 325 TABLET ORAL at 09:09

## 2018-09-08 RX ADMIN — VANCOMYCIN 1000 MG: 1 INJECTION, SOLUTION INTRAVENOUS at 12:09

## 2018-09-08 RX ADMIN — AMPICILLIN AND SULBACTAM 3 G: 2; 1 INJECTION, POWDER, FOR SOLUTION INTRAVENOUS at 05:09

## 2018-09-08 RX ADMIN — GABAPENTIN 300 MG: 300 CAPSULE ORAL at 09:09

## 2018-09-08 RX ADMIN — AMPICILLIN AND SULBACTAM 3 G: 2; 1 INJECTION, POWDER, FOR SOLUTION INTRAVENOUS at 11:09

## 2018-09-08 NOTE — PROGRESS NOTES
[facility] Hospital Medicine  Progress Note    Patient Name: Mariah Foley  MRN: [id number]  Patient Class: IP- Inpatient   Admission Date: 2018  Length of Stay: 6 days  Attending Physician: Joe Helton MD  Primary Care Provider: Lukas Stein MD    [facility] Medicine Team: [facility] 5 Chiquita Mak MD    Subjective:     Principal Problem:Sepsis    HPI:  46 yo F  with pmhx of fibromyalgia and DM2, in her usual state of health until 5 days prior to admission when she developed a sore throat with productive cough, which resolved after 2 days. Patient then developed myalgias, mainly around her left scapula and left CVA which she describes as a muscle stretching pain, 7 of 10 intensity, constant, associated with subjective fever, chills, generalized body aches, headache, sharp right ear paip, nausea, vomiting (once daily for past 4 days in the morning, nonbloody). No alleviating or aggravating factors. Patient tried ibuprofen for the pain at home which did not help. No chest or abdomian pain, diarrhea, constipation, changes in urinary habits, dysuria, hematuria, vaginal discharge or odors. Not sexually active. Has a ulcer on plantar aspect of right great toe which she says has been there for 2 months and has not noticed any pain, warmth, or discharge from it, although on examination there was obvious purulent discharge once dressing was removed. 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 Denies any recent trauma, sick contacts, travel out of the country.     Hospital Course:  9/3- Patient seen today and she is vitally stable except of low BP 97/54. Waiting for the MRI result of the R great toe. Continue on Vanc and Zosyn and f/u with Vanc trough tomorrow AM.  - NAEON, patient seen today and she is vitally stable. MRI of R great toe planter ulcer showed no evidence of osteomyelitis or drainable fluid collection. Wound culture showed gram negative  rods lactose . Urine culture showed staph aureus. Blood culture showed gram negative cocci. Repeat blood culture and continue on broad spectrum Abx (Vanc and Zosyn). Podiatry is following and debridment was performed today. Patient has productive cough, headache and congestion. Cetirizine and Fluticasone nasal spray was ordered. Insulin dose adjusted (Detemir 10U BID and Aspart 5U TID WM).  9/5 - no acute events. Awaiting further speciation  9/6- NAEON, patient seen today and she is vitally stable. Continue on Vanc and Zosyn and f/u with Vanc trough tomorrow AM. Adjust insulin regimen (detemir 15U BID, aspart 8U TID and continue on low dose ISS), f/u Podiatry recs.  9/7- NAEON, patient seen today and she is vitally stable. Continue on Vanc and Zosyn and f/u with Vanc trough. Adjust insulin regimen to (detemir 20U BID and aspart 10U TID). F/u Podiatry recs.  9/8- NAEON, patient seen today and she is vitally stable. CT maxillofacial and 2d echo was ordered to rule out bacteremia complications. Zosyn was discontinued. Continue on Vanc and Unasyn.    Interval History: No acute events overnight. Vitals within normal.     Review of Systems   Constitutional: Negative for appetite change, chills, diaphoresis, fatigue and fever.   HENT: Negative for congestion, ear pain, mouth sores, nosebleeds, postnasal drip, rhinorrhea, sneezing, sore throat, tinnitus and trouble swallowing.    Eyes: Negative for photophobia, pain, discharge and visual disturbance.   Respiratory: Positive for cough. Negative for choking, chest tightness, shortness of breath, wheezing and stridor.    Cardiovascular: Negative for chest pain, palpitations and leg swelling.   Gastrointestinal: Negative for abdominal distention, abdominal pain, anal bleeding, blood in stool, constipation, diarrhea, nausea and vomiting.   Endocrine: Negative for polydipsia, polyphagia and polyuria.   Genitourinary: Negative for difficulty urinating, flank pain,  hematuria, pelvic pain, vaginal bleeding, vaginal discharge and vaginal pain.   Musculoskeletal: Positive for back pain and myalgias. Negative for arthralgias, joint swelling, neck pain and neck stiffness.   Skin: Positive for wound. Negative for rash.   Allergic/Immunologic: Negative for immunocompromised state.   Neurological: Negative for dizziness, tremors, facial asymmetry, weakness, light-headedness, numbness and headaches.   Hematological: Negative for adenopathy. Does not bruise/bleed easily.     Objective:     Vital Signs (Most Recent):  Temp: 97.5 °F (36.4 °C) (09/08/18 0817)  Pulse: 78 (09/08/18 0817)  Resp: 16 (09/08/18 0817)  BP: 138/77 (09/08/18 0817)  SpO2: 99 % (09/08/18 0817) Vital Signs (24h Range):  Temp:  [96.2 °F (35.7 °C)-97.6 °F (36.4 °C)] 97.5 °F (36.4 °C)  Pulse:  [75-87] 78  Resp:  [16-20] 16  SpO2:  [96 %-99 %] 99 %  BP: (119-155)/(68-85) 138/77     Weight: 62.6 kg (138 lb)  Body mass index is 24.45 kg/m².    Intake/Output Summary (Last 24 hours) at 9/8/2018 1040  Last data filed at 9/8/2018 0600  Gross per 24 hour   Intake 400 ml   Output --   Net 400 ml      Physical Exam   Constitutional: She is oriented to person, place, and time. She appears well-developed and well-nourished.   She is lying on the bed comfortably with no signs of pain or distress   HENT:   Head: Normocephalic and atraumatic.   Right Ear: External ear normal.   Left Ear: External ear normal.   Mouth/Throat: Oropharynx is clear and moist. No oropharyngeal exudate.   Eyes: Conjunctivae and EOM are normal. Pupils are equal, round, and reactive to light. Right eye exhibits no discharge. Left eye exhibits no discharge. No scleral icterus.   Neck: Normal range of motion. Neck supple. No thyromegaly present.   Cardiovascular: Normal rate, regular rhythm, normal heart sounds and intact distal pulses. Exam reveals no gallop and no friction rub.   No murmur heard.  Pulmonary/Chest: Effort normal and breath sounds normal. No  stridor. No respiratory distress. She has no wheezes. She has no rales. She exhibits no tenderness.   Abdominal: Soft. Bowel sounds are normal. She exhibits no distension and no mass. There is no tenderness. There is no guarding.   Musculoskeletal: Normal range of motion. She exhibits no edema or deformity.   1 cm ulcer on plantar aspect of right great toe, tissue debridement was performed by Podiatry (on 9/4) and lesion was dressed properly.   Lymphadenopathy:     She has no cervical adenopathy.   Neurological: She is alert and oriented to person, place, and time.   Skin: Skin is warm and dry. Capillary refill takes less than 2 seconds. No rash noted. No erythema.     Significant Labs:   CBC:   Recent Labs   Lab  09/07/18   0512  09/08/18   0419   WBC  7.79  11.08   HGB  11.2*  11.2*   HCT  33.9*  34.4*   PLT  220  275     CMP:   Recent Labs   Lab  09/07/18   0512  09/08/18   0419   NA  135*  135*   K  3.9  3.7   CL  101  99   CO2  26  27   GLU  395*  322*   BUN  16  15   CREATININE  0.8  0.8   CALCIUM  9.0  9.2   PROT  6.2  6.7   ALBUMIN  2.6*  2.9*   BILITOT  0.3  0.2   ALKPHOS  195*  194*   AST  57*  90*   ALT  66*  101*   ANIONGAP  8  9   EGFRNONAA  >60.0  >60.0       Significant Imaging: I have reviewed and interpreted all pertinent imaging results/findings within the past 24 hours.    Assessment/Plan:      * Sepsis    Bacteremia  Diabetic foot culture  Urinary tract infection  - presented with 3/4 SIRS. Rapid strep, influenza, HIV, and monospot testing negative.   - etiologies multifactorial including MRSA in urine culture (9/2), gram negative dionicio in blood culture 9/2/18, and klebsiella from right food ulcer 9/2/18  - CT maxillofacial and 2d echo was ordered as the poor oral hygiene is the most likely primary source.  - continue on Vanc and started on Unasyn on 9/7  - f/u infectious diseases recommendations.  - appreciate further Podiatry recs        Diabetic ulcer of toe of right foot    - podiatry  consulted  - MRI of R foot showed no evidence of osteomyelitis or drainable fluid collection.  - debridement was performed by podiatry (on 9/4) and wound has dressed properly.  - wound culture showed gram negative rods lactose  (Klebsiella), Pravotella bivia and Veillonella parvula.  - Continue on Vanc (vanc trough is 19) and discontinue Zosyn. Started on Unasyn on 9/7. f/u Podiatry recs.        Bacteremia    - blood culture showed Veillonella Parvula  - continue on Vanc and Unasyn  - CT maxillofacial and 2d echo was ordered.          UTI (urinary tract infection)    - rocephin given in ED for possible UTI   - urine culture showed MRSA, no symptoms or signs of UTI.  - continue on broad spectrum Abx         Cough    -CXR unremarkable  -sputum culture if productive cough continues   -she has headache and congestion, Cetirizine was ordered.  -Guaifenesin was ordered.  -symptoms and signs has improved, d/c Cetirizine.          DM2 (diabetes mellitus, type 2)    -takes insulin at home, patient think it's 10 units at night. Also on glimepiride BID   -currently she is on detemir 25U BID and scheduled aspart 15U TID with ISS  -continue POCT and BGL; adjust regimen as needed        Liver injury    - AST and ALT is trending up since date of admission.  - Liver US scan and hepatitis study were ordered.  - Duloxetine and Cetirizine are discontinued.        Fibromyalgia      -resume gabapentin 300 TID            Tobacco abuse    - for tobacco cessation           Debility    -PT and OT was ordered to treat and evaluate. Appreciate assistance.  - recommending discharge home when medically ready        Leukocytosis      -see plan for sepsis           VTE Risk Mitigation (From admission, onward)        Ordered     enoxaparin injection 40 mg  Daily      09/03/18 0722     IP VTE LOW RISK PATIENT  Once      09/02/18 2155     Place sequential compression device  Until discontinued      09/02/18 2155     IP VTE LOW RISK  PATIENT  Once      09/02/18 9043              Chiquita Mak MD  Department of Hospital Medicine   [facility]

## 2018-09-08 NOTE — ASSESSMENT & PLAN NOTE
- blood culture showed Veillonella Parvula  - continue on Vanc and Unasyn  - CT maxillofacial and 2d echo was ordered.

## 2018-09-08 NOTE — SUBJECTIVE & OBJECTIVE
Interval History: NAEON. Fevers resolved. Patient reports feeling much better today. No new complaints. Awaiting further imaging studies.     Review of Systems   Constitutional: Negative for chills, diaphoresis and fever.   HENT: Positive for dental problem. Negative for congestion, ear pain, hearing loss, sore throat and tinnitus.    Eyes: Negative for pain, redness and visual disturbance.   Respiratory: Negative for cough and shortness of breath.    Cardiovascular: Negative for chest pain and leg swelling.   Gastrointestinal: Negative for abdominal pain, constipation, diarrhea and nausea.   Genitourinary: Negative for difficulty urinating, dysuria and hematuria.   Musculoskeletal: Negative for arthralgias, myalgias and neck pain.   Skin: Negative for rash and wound.   Neurological: Negative for dizziness, facial asymmetry, weakness, light-headedness, numbness and headaches.   Psychiatric/Behavioral: Negative for confusion. The patient is not nervous/anxious.      Objective:     Vital Signs (Most Recent):  Temp: 97.8 °F (36.6 °C) (09/08/18 1220)  Pulse: 82 (09/08/18 1220)  Resp: 16 (09/08/18 1220)  BP: 128/78 (09/08/18 1220)  SpO2: 98 % (09/08/18 1220) Vital Signs (24h Range):  Temp:  [96.2 °F (35.7 °C)-97.8 °F (36.6 °C)] 97.8 °F (36.6 °C)  Pulse:  [75-87] 82  Resp:  [16-20] 16  SpO2:  [96 %-99 %] 98 %  BP: (119-155)/(68-85) 128/78     Weight: 62.6 kg (138 lb)  Body mass index is 24.45 kg/m².    Estimated Creatinine Clearance: 71.1 mL/min (based on SCr of 0.8 mg/dL).    Physical Exam   Constitutional: She is oriented to person, place, and time. She appears well-developed and well-nourished. No distress.   HENT:   Head: Normocephalic and atraumatic.   Mouth/Throat: Uvula is midline, oropharynx is clear and moist and mucous membranes are normal. She does not have dentures. No oral lesions. Abnormal dentition (all upper teeth missing). Dental caries (multiple cavitary and eroded teeth lower.) present. No dental  abscesses or lacerations.   Eyes: Pupils are equal, round, and reactive to light. No scleral icterus.   Cardiovascular: Normal rate, regular rhythm and normal heart sounds.   No murmur heard.  Pulmonary/Chest: Effort normal and breath sounds normal. No respiratory distress. She has no wheezes. She has no rales.   Abdominal: Soft. She exhibits no distension and no mass. There is no hepatosplenomegaly. There is no tenderness.   Musculoskeletal: She exhibits no edema.   Lymphadenopathy:     She has no cervical adenopathy.   Neurological: She is alert and oriented to person, place, and time.   Skin: Skin is warm, dry and intact. No rash noted.   Right foot ulcer c/d/i. No drainage. No tenderness.    Psychiatric: She has a normal mood and affect. Her behavior is normal.       Significant Labs: All pertinent labs within the past 24 hours have been reviewed.    Significant Imaging: I have reviewed all pertinent imaging results/findings within the past 24 hours.

## 2018-09-08 NOTE — ASSESSMENT & PLAN NOTE
-CXR unremarkable  -sputum culture if productive cough continues   -she has headache and congestion, Cetirizine was ordered.  -Guaifenesin was ordered.  -symptoms and signs has improved, d/c Cetirizine.

## 2018-09-08 NOTE — ASSESSMENT & PLAN NOTE
-takes insulin at home, patient think it's 10 units at night. Also on glimepiride BID   -currently she is on detemir 25U BID and scheduled aspart 15U TID with ISS  -continue POCT and BGL; adjust regimen as needed

## 2018-09-08 NOTE — ASSESSMENT & PLAN NOTE
- AST and ALT is trending up since date of admission.  - Liver US scan and hepatitis study were ordered.  - Duloxetine and Cetirizine are discontinued.

## 2018-09-08 NOTE — ASSESSMENT & PLAN NOTE
Bacteremia  Diabetic foot culture  Urinary tract infection  - presented with 3/4 SIRS. Rapid strep, influenza, HIV, and monospot testing negative.   - etiologies multifactorial including MRSA in urine culture (9/2), gram negative dionicio in blood culture 9/2/18, and klebsiella from right food ulcer 9/2/18  - CT maxillofacial and 2d echo was ordered as the poor oral hygiene is the most likely primary source.  - continue on Vanc and started on Unasyn on 9/7  - f/u infectious diseases recommendations.  - appreciate further Podiatry recs

## 2018-09-08 NOTE — ASSESSMENT & PLAN NOTE
- podiatry consulted  - MRI of R foot showed no evidence of osteomyelitis or drainable fluid collection.  - debridement was performed by podiatry (on 9/4) and wound has dressed properly.  - wound culture showed gram negative rods lactose  (Klebsiella), Pravotella bivia and Veillonella parvula.  - Continue on Vanc (vanc trough is 19) and discontinue Zosyn. Started on Unasyn on 9/7. f/u Podiatry recs.

## 2018-09-08 NOTE — ASSESSMENT & PLAN NOTE
- Cultures show Prevotella, Veillonella, and Kelsiella   - given cultures growing Veillonella - suspect source of bacteremia  - MRI without drainable fluid collections or osteomyelitis - suspect limited to soft tissues  - will treat with two weeks of antibiotics for cultured organisms      Plan  - Continue Vancomycin and Unasyn. Vanc trough goal 15-20.   - Follow up Veillonella susceptibilities and repeat blood cultures to ensure clearance  - Recommend CT max/face or MRI if able to evaluate for osteomyelitis/abscess as suscept her teeth is the source of bacteremia and any foci of infection will need to be drained.   - Given Veillonella can be associated with endocarditis and MRSA in urine, recommend 2D echo (ordered)  - Anticipate at least two weeks of antibiotics. If endocarditis or osteomyelitis is found - will need to extend duration to six weeks.  - ID will follow.

## 2018-09-08 NOTE — ASSESSMENT & PLAN NOTE
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 She was found to be bacteremic with Veillonella parvula and also has MRSA growing in urine, which was likely secondarily seeded from a primary bloodstream infection. Suspect source of both of these infections is her oral cavity.  Her foot ulcer looks well healed on exam. Veillonella is a rare gram negative anerobic cocci. It is part of oral jose e and is capable of causing severe infections such as endocarditis, osteomyelitis. Very little data exists on treatment. There have been cases of penicillin, vanc, cipro, tetracycline resistance. Need to f/u antibiotic sensitivies. Susceptibility studies suggest that chloramphenicol, clindamycin, and metronidazole have the greatest activity against Veillonella.

## 2018-09-08 NOTE — SUBJECTIVE & OBJECTIVE
Interval History: No acute events overnight. Vitals within normal.     Review of Systems   Constitutional: Negative for appetite change, chills, diaphoresis, fatigue and fever.   HENT: Negative for congestion, ear pain, mouth sores, nosebleeds, postnasal drip, rhinorrhea, sneezing, sore throat, tinnitus and trouble swallowing.    Eyes: Negative for photophobia, pain, discharge and visual disturbance.   Respiratory: Positive for cough. Negative for choking, chest tightness, shortness of breath, wheezing and stridor.    Cardiovascular: Negative for chest pain, palpitations and leg swelling.   Gastrointestinal: Negative for abdominal distention, abdominal pain, anal bleeding, blood in stool, constipation, diarrhea, nausea and vomiting.   Endocrine: Negative for polydipsia, polyphagia and polyuria.   Genitourinary: Negative for difficulty urinating, flank pain, hematuria, pelvic pain, vaginal bleeding, vaginal discharge and vaginal pain.   Musculoskeletal: Positive for back pain and myalgias. Negative for arthralgias, joint swelling, neck pain and neck stiffness.   Skin: Positive for wound. Negative for rash.   Allergic/Immunologic: Negative for immunocompromised state.   Neurological: Negative for dizziness, tremors, facial asymmetry, weakness, light-headedness, numbness and headaches.   Hematological: Negative for adenopathy. Does not bruise/bleed easily.     Objective:     Vital Signs (Most Recent):  Temp: 97.5 °F (36.4 °C) (09/08/18 0817)  Pulse: 78 (09/08/18 0817)  Resp: 16 (09/08/18 0817)  BP: 138/77 (09/08/18 0817)  SpO2: 99 % (09/08/18 0817) Vital Signs (24h Range):  Temp:  [96.2 °F (35.7 °C)-97.6 °F (36.4 °C)] 97.5 °F (36.4 °C)  Pulse:  [75-87] 78  Resp:  [16-20] 16  SpO2:  [96 %-99 %] 99 %  BP: (119-155)/(68-85) 138/77     Weight: 62.6 kg (138 lb)  Body mass index is 24.45 kg/m².    Intake/Output Summary (Last 24 hours) at 9/8/2018 1040  Last data filed at 9/8/2018 0600  Gross per 24 hour   Intake 400 ml    Output --   Net 400 ml      Physical Exam   Constitutional: She is oriented to person, place, and time. She appears well-developed and well-nourished.   She is lying on the bed comfortably with no signs of pain or distress   HENT:   Head: Normocephalic and atraumatic.   Right Ear: External ear normal.   Left Ear: External ear normal.   Mouth/Throat: Oropharynx is clear and moist. No oropharyngeal exudate.   Eyes: Conjunctivae and EOM are normal. Pupils are equal, round, and reactive to light. Right eye exhibits no discharge. Left eye exhibits no discharge. No scleral icterus.   Neck: Normal range of motion. Neck supple. No thyromegaly present.   Cardiovascular: Normal rate, regular rhythm, normal heart sounds and intact distal pulses. Exam reveals no gallop and no friction rub.   No murmur heard.  Pulmonary/Chest: Effort normal and breath sounds normal. No stridor. No respiratory distress. She has no wheezes. She has no rales. She exhibits no tenderness.   Abdominal: Soft. Bowel sounds are normal. She exhibits no distension and no mass. There is no tenderness. There is no guarding.   Musculoskeletal: Normal range of motion. She exhibits no edema or deformity.   1 cm ulcer on plantar aspect of right great toe, tissue debridement was performed by Podiatry (on 9/4) and lesion was dressed properly.   Lymphadenopathy:     She has no cervical adenopathy.   Neurological: She is alert and oriented to person, place, and time.   Skin: Skin is warm and dry. Capillary refill takes less than 2 seconds. No rash noted. No erythema.     Significant Labs:   CBC:   Recent Labs   Lab  09/07/18   0512  09/08/18 0419   WBC  7.79  11.08   HGB  11.2*  11.2*   HCT  33.9*  34.4*   PLT  220  275     CMP:   Recent Labs   Lab  09/07/18   0512  09/08/18   0419   NA  135*  135*   K  3.9  3.7   CL  101  99   CO2  26  27   GLU  395*  322*   BUN  16  15   CREATININE  0.8  0.8   CALCIUM  9.0  9.2   PROT  6.2  6.7   ALBUMIN  2.6*  2.9*   BILITOT   0.3  0.2   ALKPHOS  195*  194*   AST  57*  90*   ALT  66*  101*   ANIONGAP  8  9   EGFRNONAA  >60.0  >60.0       Significant Imaging: I have reviewed and interpreted all pertinent imaging results/findings within the past 24 hours.

## 2018-09-09 LAB
ALBUMIN SERPL BCP-MCNC: 2.9 G/DL
ALP SERPL-CCNC: 197 U/L
ALT SERPL W/O P-5'-P-CCNC: 109 U/L
ANION GAP SERPL CALC-SCNC: 9 MMOL/L
ANISOCYTOSIS BLD QL SMEAR: SLIGHT
AST SERPL-CCNC: 83 U/L
BACTERIA BLD CULT: NORMAL
BACTERIA BLD CULT: NORMAL
BASOPHILS NFR BLD: 0 %
BILIRUB SERPL-MCNC: 0.2 MG/DL
BUN SERPL-MCNC: 16 MG/DL
CALCIUM SERPL-MCNC: 9.1 MG/DL
CHLORIDE SERPL-SCNC: 99 MMOL/L
CO2 SERPL-SCNC: 27 MMOL/L
CREAT SERPL-MCNC: 0.7 MG/DL
DIASTOLIC DYSFUNCTION: NO
DIFFERENTIAL METHOD: ABNORMAL
EOSINOPHIL NFR BLD: 2 %
ERYTHROCYTE [DISTWIDTH] IN BLOOD BY AUTOMATED COUNT: 12.6 %
EST. GFR  (AFRICAN AMERICAN): >60 ML/MIN/1.73 M^2
EST. GFR  (NON AFRICAN AMERICAN): >60 ML/MIN/1.73 M^2
ESTIMATED PA SYSTOLIC PRESSURE: 36.94
GLUCOSE SERPL-MCNC: 302 MG/DL
HCT VFR BLD AUTO: 34.5 %
HGB BLD-MCNC: 11.6 G/DL
IMM GRANULOCYTES # BLD AUTO: ABNORMAL K/UL
IMM GRANULOCYTES NFR BLD AUTO: ABNORMAL %
LYMPHOCYTES NFR BLD: 15 %
MAGNESIUM SERPL-MCNC: 2 MG/DL
MCH RBC QN AUTO: 27.6 PG
MCHC RBC AUTO-ENTMCNC: 33.6 G/DL
MCV RBC AUTO: 82 FL
METAMYELOCYTES NFR BLD MANUAL: 2 %
MITRAL VALVE REGURGITATION: NORMAL
MONOCYTES NFR BLD: 1 %
NEUTROPHILS NFR BLD: 77 %
NEUTS BAND NFR BLD MANUAL: 3 %
NRBC BLD-RTO: 0 /100 WBC
PHOSPHATE SERPL-MCNC: 4.3 MG/DL
PLATELET # BLD AUTO: 302 K/UL
PLATELET BLD QL SMEAR: ABNORMAL
PMV BLD AUTO: 10.9 FL
POCT GLUCOSE: 137 MG/DL (ref 70–110)
POCT GLUCOSE: 158 MG/DL (ref 70–110)
POCT GLUCOSE: 293 MG/DL (ref 70–110)
POCT GLUCOSE: 309 MG/DL (ref 70–110)
POCT GLUCOSE: 41 MG/DL (ref 70–110)
POIKILOCYTOSIS BLD QL SMEAR: SLIGHT
POLYCHROMASIA BLD QL SMEAR: ABNORMAL
POTASSIUM SERPL-SCNC: 3.5 MMOL/L
PROT SERPL-MCNC: 6.9 G/DL
RBC # BLD AUTO: 4.21 M/UL
RETIRED EF AND QEF - SEE NOTES: 60 (ref 55–65)
SODIUM SERPL-SCNC: 135 MMOL/L
TRICUSPID VALVE REGURGITATION: NORMAL
VANCOMYCIN TROUGH SERPL-MCNC: 15.8 UG/ML
WBC # BLD AUTO: 12.6 K/UL

## 2018-09-09 PROCEDURE — 63600175 PHARM REV CODE 636 W HCPCS: Performed by: PHYSICIAN ASSISTANT

## 2018-09-09 PROCEDURE — 83735 ASSAY OF MAGNESIUM: CPT

## 2018-09-09 PROCEDURE — 36415 COLL VENOUS BLD VENIPUNCTURE: CPT

## 2018-09-09 PROCEDURE — 25000003 PHARM REV CODE 250: Performed by: STUDENT IN AN ORGANIZED HEALTH CARE EDUCATION/TRAINING PROGRAM

## 2018-09-09 PROCEDURE — 84100 ASSAY OF PHOSPHORUS: CPT

## 2018-09-09 PROCEDURE — 63600175 PHARM REV CODE 636 W HCPCS: Performed by: STUDENT IN AN ORGANIZED HEALTH CARE EDUCATION/TRAINING PROGRAM

## 2018-09-09 PROCEDURE — 99233 SBSQ HOSP IP/OBS HIGH 50: CPT | Mod: ,,, | Performed by: STUDENT IN AN ORGANIZED HEALTH CARE EDUCATION/TRAINING PROGRAM

## 2018-09-09 PROCEDURE — 93306 TTE W/DOPPLER COMPLETE: CPT | Mod: 26,,, | Performed by: INTERNAL MEDICINE

## 2018-09-09 PROCEDURE — 25000242 PHARM REV CODE 250 ALT 637 W/ HCPCS: Performed by: STUDENT IN AN ORGANIZED HEALTH CARE EDUCATION/TRAINING PROGRAM

## 2018-09-09 PROCEDURE — 80053 COMPREHEN METABOLIC PANEL: CPT

## 2018-09-09 PROCEDURE — 25000003 PHARM REV CODE 250: Performed by: PHYSICIAN ASSISTANT

## 2018-09-09 PROCEDURE — 11000001 HC ACUTE MED/SURG PRIVATE ROOM

## 2018-09-09 PROCEDURE — 93306 TTE W/DOPPLER COMPLETE: CPT

## 2018-09-09 PROCEDURE — 80202 ASSAY OF VANCOMYCIN: CPT

## 2018-09-09 RX ORDER — DIPHENHYDRAMINE HCL 25 MG
25 CAPSULE ORAL EVERY 6 HOURS PRN
Status: DISCONTINUED | OUTPATIENT
Start: 2018-09-09 | End: 2018-09-13 | Stop reason: HOSPADM

## 2018-09-09 RX ADMIN — AMPICILLIN AND SULBACTAM 3 G: 2; 1 INJECTION, POWDER, FOR SOLUTION INTRAVENOUS at 09:09

## 2018-09-09 RX ADMIN — ENOXAPARIN SODIUM 40 MG: 100 INJECTION SUBCUTANEOUS at 04:09

## 2018-09-09 RX ADMIN — HYDROCODONE BITARTRATE AND ACETAMINOPHEN 1 TABLET: 7.5; 325 TABLET ORAL at 05:09

## 2018-09-09 RX ADMIN — AMPICILLIN AND SULBACTAM 3 G: 2; 1 INJECTION, POWDER, FOR SOLUTION INTRAVENOUS at 10:09

## 2018-09-09 RX ADMIN — INSULIN ASPART 5 UNITS: 100 INJECTION, SOLUTION INTRAVENOUS; SUBCUTANEOUS at 04:09

## 2018-09-09 RX ADMIN — FLUTICASONE PROPIONATE 50 MCG: 50 SPRAY, METERED NASAL at 08:09

## 2018-09-09 RX ADMIN — DIPHENHYDRAMINE HYDROCHLORIDE 25 MG: 25 CAPSULE ORAL at 05:09

## 2018-09-09 RX ADMIN — DULOXETINE HYDROCHLORIDE 20 MG: 20 CAPSULE, DELAYED RELEASE ORAL at 08:09

## 2018-09-09 RX ADMIN — SENNOSIDES AND DOCUSATE SODIUM 2 TABLET: 8.6; 5 TABLET ORAL at 09:09

## 2018-09-09 RX ADMIN — VANCOMYCIN 1000 MG: 1 INJECTION, SOLUTION INTRAVENOUS at 05:09

## 2018-09-09 RX ADMIN — INSULIN ASPART 3 UNITS: 100 INJECTION, SOLUTION INTRAVENOUS; SUBCUTANEOUS at 08:09

## 2018-09-09 RX ADMIN — AMPICILLIN AND SULBACTAM 3 G: 2; 1 INJECTION, POWDER, FOR SOLUTION INTRAVENOUS at 03:09

## 2018-09-09 RX ADMIN — HYDROCODONE BITARTRATE AND ACETAMINOPHEN 1 TABLET: 7.5; 325 TABLET ORAL at 10:09

## 2018-09-09 RX ADMIN — GABAPENTIN 300 MG: 300 CAPSULE ORAL at 03:09

## 2018-09-09 RX ADMIN — KETOROLAC TROMETHAMINE 10 MG: 10 TABLET, FILM COATED ORAL at 09:09

## 2018-09-09 RX ADMIN — POLYETHYLENE GLYCOL 3350 17 G: 17 POWDER, FOR SOLUTION ORAL at 08:09

## 2018-09-09 RX ADMIN — AMPICILLIN AND SULBACTAM 3 G: 2; 1 INJECTION, POWDER, FOR SOLUTION INTRAVENOUS at 05:09

## 2018-09-09 RX ADMIN — INSULIN ASPART 15 UNITS: 100 INJECTION, SOLUTION INTRAVENOUS; SUBCUTANEOUS at 08:09

## 2018-09-09 RX ADMIN — INSULIN ASPART 15 UNITS: 100 INJECTION, SOLUTION INTRAVENOUS; SUBCUTANEOUS at 04:09

## 2018-09-09 RX ADMIN — GABAPENTIN 300 MG: 300 CAPSULE ORAL at 09:09

## 2018-09-09 RX ADMIN — DIPHENHYDRAMINE HYDROCHLORIDE 25 MG: 25 CAPSULE ORAL at 10:09

## 2018-09-09 RX ADMIN — HYDROCODONE BITARTRATE AND ACETAMINOPHEN 1 TABLET: 7.5; 325 TABLET ORAL at 04:09

## 2018-09-09 RX ADMIN — SENNOSIDES AND DOCUSATE SODIUM 2 TABLET: 8.6; 5 TABLET ORAL at 08:09

## 2018-09-09 RX ADMIN — GABAPENTIN 300 MG: 300 CAPSULE ORAL at 08:09

## 2018-09-09 RX ADMIN — INSULIN ASPART 15 UNITS: 100 INJECTION, SOLUTION INTRAVENOUS; SUBCUTANEOUS at 12:09

## 2018-09-09 NOTE — PLAN OF CARE
Problem: Patient Care Overview  Goal: Plan of Care Review  Outcome: Ongoing (interventions implemented as appropriate)  Pt in bed with no complaints voiced, no acute distress noted at this time.  Currently in bed with eyes closed, resp even and unlabored.  Frequent assessments ongoing, pain currently controlled.

## 2018-09-09 NOTE — ASSESSMENT & PLAN NOTE
Bacteremia  Diabetic foot culture  Urinary tract infection  - presented with 3/4 SIRS. Rapid strep, influenza, HIV, and monospot testing negative.   - etiologies multifactorial including MRSA in urine culture (9/2), gram negative dionicio in blood culture 9/2/18, and klebsiella from right food ulcer 9/2/18  - CT maxillofacial showed no drainable periodontal fluid collection.  - 2d echo was ordered as the poor oral hygiene is the most likely primary source.  - continue on Vanc and started on Unasyn on 9/7  - f/u infectious diseases recommendations.  - appreciate further Podiatry recs

## 2018-09-09 NOTE — SUBJECTIVE & OBJECTIVE
Interval History: No acute events overnight. Vitals within normal.     Review of Systems   Constitutional: Negative for appetite change, chills, diaphoresis, fatigue and fever.   HENT: Negative for congestion, ear pain, mouth sores, nosebleeds, postnasal drip, rhinorrhea, sneezing, sore throat, tinnitus and trouble swallowing.    Eyes: Negative for photophobia, pain, discharge and visual disturbance.   Respiratory: Positive for cough. Negative for choking, chest tightness, shortness of breath, wheezing and stridor.    Cardiovascular: Negative for chest pain, palpitations and leg swelling.   Gastrointestinal: Negative for abdominal distention, abdominal pain, anal bleeding, blood in stool, constipation, diarrhea, nausea and vomiting.   Endocrine: Negative for polydipsia, polyphagia and polyuria.   Genitourinary: Negative for difficulty urinating, flank pain, hematuria, pelvic pain, vaginal bleeding, vaginal discharge and vaginal pain.   Musculoskeletal: Positive for back pain and myalgias. Negative for arthralgias, joint swelling, neck pain and neck stiffness.   Skin: Positive for wound. Negative for rash.   Allergic/Immunologic: Negative for immunocompromised state.   Neurological: Negative for dizziness, tremors, facial asymmetry, weakness, light-headedness, numbness and headaches.   Hematological: Negative for adenopathy. Does not bruise/bleed easily.     Objective:     Vital Signs (Most Recent):  Temp: 96.6 °F (35.9 °C) (09/09/18 0730)  Pulse: 89 (09/09/18 0730)  Resp: 16 (09/09/18 0730)  BP: (!) 159/75 (09/09/18 0730)  SpO2: 97 % (09/09/18 0730) Vital Signs (24h Range):  Temp:  [96.4 °F (35.8 °C)-98 °F (36.7 °C)] 96.6 °F (35.9 °C)  Pulse:  [76-89] 89  Resp:  [14-20] 16  SpO2:  [96 %-99 %] 97 %  BP: (128-163)/(73-87) 159/75     Weight: 62.6 kg (138 lb)  Body mass index is 24.45 kg/m².    Intake/Output Summary (Last 24 hours) at 9/9/2018 1049  Last data filed at 9/9/2018 0400  Gross per 24 hour   Intake 1150 ml    Output --   Net 1150 ml      Physical Exam   Constitutional: She is oriented to person, place, and time. She appears well-developed and well-nourished.   She is lying on the bed comfortably with no signs of pain or distress   HENT:   Head: Normocephalic and atraumatic.   Right Ear: External ear normal.   Left Ear: External ear normal.   Mouth/Throat: Oropharynx is clear and moist. No oropharyngeal exudate.   Eyes: Conjunctivae and EOM are normal. Pupils are equal, round, and reactive to light. Right eye exhibits no discharge. Left eye exhibits no discharge. No scleral icterus.   Neck: Normal range of motion. Neck supple. No thyromegaly present.   Cardiovascular: Normal rate, regular rhythm, normal heart sounds and intact distal pulses. Exam reveals no gallop and no friction rub.   No murmur heard.  Pulmonary/Chest: Effort normal and breath sounds normal. No stridor. No respiratory distress. She has no wheezes. She has no rales. She exhibits no tenderness.   Abdominal: Soft. Bowel sounds are normal. She exhibits no distension and no mass. There is no tenderness. There is no guarding.   Musculoskeletal: Normal range of motion. She exhibits no edema or deformity.   1 cm ulcer on plantar aspect of right great toe, tissue debridement was performed by Podiatry (on 9/4) and lesion was dressed properly.   Lymphadenopathy:     She has no cervical adenopathy.   Neurological: She is alert and oriented to person, place, and time.   Skin: Skin is warm and dry. Capillary refill takes less than 2 seconds. No rash noted. No erythema.     Significant Labs:   CBC:   Recent Labs   Lab  09/08/18 0419 09/09/18 0449   WBC  11.08  12.60   HGB  11.2*  11.6*   HCT  34.4*  34.5*   PLT  275  302     CMP:   Recent Labs   Lab  09/08/18 0419 09/09/18 0449   NA  135*  135*   K  3.7  3.5   CL  99  99   CO2  27  27   GLU  322*  302*   BUN  15  16   CREATININE  0.8  0.7   CALCIUM  9.2  9.1   PROT  6.7  6.9   ALBUMIN  2.9*  2.9*   BILITOT   0.2  0.2   ALKPHOS  194*  197*   AST  90*  83*   ALT  101*  109*   ANIONGAP  9  9   EGFRNONAA  >60.0  >60.0       Significant Imaging: I have reviewed and interpreted all pertinent imaging results/findings within the past 24 hours.

## 2018-09-09 NOTE — PLAN OF CARE
Problem: Diabetes, Type 2 (Adult)  Intervention: Optimize Glycemic Control  BG monitored and insulin provided per MD order. Education provided on diet and monitoring schedule.

## 2018-09-09 NOTE — ASSESSMENT & PLAN NOTE
- podiatry consulted  - MRI of R foot showed no evidence of osteomyelitis or drainable fluid collection.  - debridement was performed by podiatry (on 9/4) and wound has dressed properly.  - wound culture showed gram negative rods lactose  (Klebsiella), Pravotella bivia and Veillonella parvula.  - Continue on Vanc (vanc trough is 19) and discontinue Zosyn. Started on Unasyn on 9/7.  - f/u Podiatry recs.  - f/u ID recs.

## 2018-09-09 NOTE — PLAN OF CARE
Problem: Patient Care Overview  Goal: Plan of Care Review  Outcome: Ongoing (interventions implemented as appropriate)  Patient stable at this time and in no acute distress. Patient in CT right now, will take blood glucose after she returns prior to her dinner.

## 2018-09-09 NOTE — ASSESSMENT & PLAN NOTE
- AST and ALT is trending up since date of admission.  - Liver US scan showed fatty live with no focal hepatic lesion  - hepatitis study still in process.  - Duloxetine and Cetirizine are discontinued.

## 2018-09-09 NOTE — ASSESSMENT & PLAN NOTE
-takes insulin at home, patient think it's 10 units at night. Also on glimepiride BID   -currently she is on detemir 30U BID and scheduled aspart 15U TID with ISS  -continue POCT and BGL; adjust regimen as needed

## 2018-09-09 NOTE — PLAN OF CARE
Problem: Patient Care Overview  Goal: Plan of Care Review  Outcome: Ongoing (interventions implemented as appropriate)  Patient resting in bed comfortably. IV intact with no signs of irritation. Fall precautions maintained with no falls noted. Call light in reach bed locked and in lowest position. Non-skid socks on while out of bed. patient instructed to call for assistance. Skin integrity maintained as patient is independent with frequent positioning. C/o pain managed with prn meds. No other complaints or concerns. Progressing towards goals. Will continue to monitor and follow plan of care.

## 2018-09-09 NOTE — PROGRESS NOTES
[facility] Hospital Medicine  Progress Note    Patient Name: Mariah Foley  MRN: [id number]  Patient Class: IP- Inpatient   Admission Date: 2018  Length of Stay: 7 days  Attending Physician: Joe Helton MD  Primary Care Provider: Lukas Stein MD    [facility] Medicine Team: [facility] HOSP MED 5 Chiquita Mak MD    Subjective:     Principal Problem:Sepsis    HPI:  46 yo F  with pmhx of fibromyalgia and DM2, in her usual state of health until 5 days prior to admission when she developed a sore throat with productive cough, which resolved after 2 days. Patient then developed myalgias, mainly around her left scapula and left CVA which she describes as a muscle stretching pain, 7 of 10 intensity, constant, associated with subjective fever, chills, generalized body aches, headache, sharp right ear paip, nausea, vomiting (once daily for past 4 days in the morning, nonbloody). No alleviating or aggravating factors. Patient tried ibuprofen for the pain at home which did not help. No chest or abdomian pain, diarrhea, constipation, changes in urinary habits, dysuria, hematuria, vaginal discharge or odors. Not sexually active. Has a ulcer on plantar aspect of right great toe which she says has been there for 2 months and has not noticed any pain, warmth, or discharge from it, although on examination there was obvious purulent discharge once dressing was removed. 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 Denies any recent trauma, sick contacts, travel out of the country.     Hospital Course:  9/3- Patient seen today and she is vitally stable except of low BP 97/54. Waiting for the MRI result of the R great toe. Continue on Vanc and Zosyn and f/u with Vanc trough tomorrow AM.  - NAEON, patient seen today and she is vitally stable. MRI of R great toe planter ulcer showed no evidence of osteomyelitis or drainable fluid collection. Wound culture showed gram negative  rods lactose . Urine culture showed staph aureus. Blood culture showed gram negative cocci. Repeat blood culture and continue on broad spectrum Abx (Vanc and Zosyn). Podiatry is following and debridment was performed today. Patient has productive cough, headache and congestion. Cetirizine and Fluticasone nasal spray was ordered. Insulin dose adjusted (Detemir 10U BID and Aspart 5U TID WM).  9/5 - no acute events. Awaiting further speciation  9/6- NAEON, patient seen today and she is vitally stable. Continue on Vanc and Zosyn and f/u with Vanc trough tomorrow AM. Adjust insulin regimen (detemir 15U BID, aspart 8U TID and continue on low dose ISS), f/u Podiatry recs.  9/7- NAEON, patient seen today and she is vitally stable. Continue on Vanc and Zosyn and f/u with Vanc trough. Adjust insulin regimen to (detemir 20U BID and aspart 10U TID). F/u Podiatry recs.  9/8- NAEON, patient seen today and she is vitally stable. CT maxillofacial and 2d echo was ordered to rule out bacteremia complications. Zosyn was discontinued. Continue on Vanc and Unasyn.  9/9- NAEON, patient is vitally stable. CT maxillofacial showed no drainable periodontal fluid collection. Waiting for 2d echo. Patient has itching, Benadryl was ordered prn, Hydrocodone dose was decreased. Midline placement was ordered.    Interval History: No acute events overnight. Vitals within normal.     Review of Systems   Constitutional: Negative for appetite change, chills, diaphoresis, fatigue and fever.   HENT: Negative for congestion, ear pain, mouth sores, nosebleeds, postnasal drip, rhinorrhea, sneezing, sore throat, tinnitus and trouble swallowing.    Eyes: Negative for photophobia, pain, discharge and visual disturbance.   Respiratory: Positive for cough. Negative for choking, chest tightness, shortness of breath, wheezing and stridor.    Cardiovascular: Negative for chest pain, palpitations and leg swelling.   Gastrointestinal: Negative for abdominal  distention, abdominal pain, anal bleeding, blood in stool, constipation, diarrhea, nausea and vomiting.   Endocrine: Negative for polydipsia, polyphagia and polyuria.   Genitourinary: Negative for difficulty urinating, flank pain, hematuria, pelvic pain, vaginal bleeding, vaginal discharge and vaginal pain.   Musculoskeletal: Positive for back pain and myalgias. Negative for arthralgias, joint swelling, neck pain and neck stiffness.   Skin: Positive for wound. Negative for rash.   Allergic/Immunologic: Negative for immunocompromised state.   Neurological: Negative for dizziness, tremors, facial asymmetry, weakness, light-headedness, numbness and headaches.   Hematological: Negative for adenopathy. Does not bruise/bleed easily.     Objective:     Vital Signs (Most Recent):  Temp: 96.6 °F (35.9 °C) (09/09/18 0730)  Pulse: 89 (09/09/18 0730)  Resp: 16 (09/09/18 0730)  BP: (!) 159/75 (09/09/18 0730)  SpO2: 97 % (09/09/18 0730) Vital Signs (24h Range):  Temp:  [96.4 °F (35.8 °C)-98 °F (36.7 °C)] 96.6 °F (35.9 °C)  Pulse:  [76-89] 89  Resp:  [14-20] 16  SpO2:  [96 %-99 %] 97 %  BP: (128-163)/(73-87) 159/75     Weight: 62.6 kg (138 lb)  Body mass index is 24.45 kg/m².    Intake/Output Summary (Last 24 hours) at 9/9/2018 1049  Last data filed at 9/9/2018 0400  Gross per 24 hour   Intake 1150 ml   Output --   Net 1150 ml      Physical Exam   Constitutional: She is oriented to person, place, and time. She appears well-developed and well-nourished.   She is lying on the bed comfortably with no signs of pain or distress   HENT:   Head: Normocephalic and atraumatic.   Right Ear: External ear normal.   Left Ear: External ear normal.   Mouth/Throat: Oropharynx is clear and moist. No oropharyngeal exudate.   Eyes: Conjunctivae and EOM are normal. Pupils are equal, round, and reactive to light. Right eye exhibits no discharge. Left eye exhibits no discharge. No scleral icterus.   Neck: Normal range of motion. Neck supple. No  thyromegaly present.   Cardiovascular: Normal rate, regular rhythm, normal heart sounds and intact distal pulses. Exam reveals no gallop and no friction rub.   No murmur heard.  Pulmonary/Chest: Effort normal and breath sounds normal. No stridor. No respiratory distress. She has no wheezes. She has no rales. She exhibits no tenderness.   Abdominal: Soft. Bowel sounds are normal. She exhibits no distension and no mass. There is no tenderness. There is no guarding.   Musculoskeletal: Normal range of motion. She exhibits no edema or deformity.   1 cm ulcer on plantar aspect of right great toe, tissue debridement was performed by Podiatry (on 9/4) and lesion was dressed properly.   Lymphadenopathy:     She has no cervical adenopathy.   Neurological: She is alert and oriented to person, place, and time.   Skin: Skin is warm and dry. Capillary refill takes less than 2 seconds. No rash noted. No erythema.     Significant Labs:   CBC:   Recent Labs   Lab  09/08/18   0419  09/09/18   0449   WBC  11.08  12.60   HGB  11.2*  11.6*   HCT  34.4*  34.5*   PLT  275  302     CMP:   Recent Labs   Lab  09/08/18   0419  09/09/18   0449   NA  135*  135*   K  3.7  3.5   CL  99  99   CO2  27  27   GLU  322*  302*   BUN  15  16   CREATININE  0.8  0.7   CALCIUM  9.2  9.1   PROT  6.7  6.9   ALBUMIN  2.9*  2.9*   BILITOT  0.2  0.2   ALKPHOS  194*  197*   AST  90*  83*   ALT  101*  109*   ANIONGAP  9  9   EGFRNONAA  >60.0  >60.0       Significant Imaging: I have reviewed and interpreted all pertinent imaging results/findings within the past 24 hours.    Assessment/Plan:      * Sepsis    Bacteremia  Diabetic foot culture  Urinary tract infection  - presented with 3/4 SIRS. Rapid strep, influenza, HIV, and monospot testing negative.   - etiologies multifactorial including MRSA in urine culture (9/2), gram negative dionicio in blood culture 9/2/18, and klebsiella from right food ulcer 9/2/18  - CT maxillofacial showed no drainable periodontal fluid  collection.  - 2d echo was ordered as the poor oral hygiene is the most likely primary source.  - continue on Vanc and started on Unasyn on 9/7  - f/u infectious diseases recommendations.  - appreciate further Podiatry recs        Diabetic ulcer of toe of right foot    - podiatry consulted  - MRI of R foot showed no evidence of osteomyelitis or drainable fluid collection.  - debridement was performed by podiatry (on 9/4) and wound has dressed properly.  - wound culture showed gram negative rods lactose  (Klebsiella), Pravotella bivia and Veillonella parvula.  - Continue on Vanc (vanc trough is 19) and discontinue Zosyn. Started on Unasyn on 9/7.  - f/u Podiatry recs.  - f/u ID recs.        Bacteremia    - blood culture showed Veillonella Parvula  - continue on Vanc and Unasyn  - see above          UTI (urinary tract infection)    - rocephin given in ED for possible UTI   - urine culture showed MRSA, no symptoms or signs of UTI.  - continue on broad spectrum Abx         Cough    -CXR unremarkable  -sputum culture if productive cough continues   -she has headache and congestion, Cetirizine was ordered.  -Guaifenesin was ordered.  -symptoms and signs has improved, d/c Cetirizine.          DM2 (diabetes mellitus, type 2)    -takes insulin at home, patient think it's 10 units at night. Also on glimepiride BID   -currently she is on detemir 30U BID and scheduled aspart 15U TID with ISS  -continue POCT and BGL; adjust regimen as needed        Liver injury    - AST and ALT is trending up since date of admission.  - Liver US scan showed fatty live with no focal hepatic lesion  - hepatitis study still in process.  - Duloxetine and Cetirizine are discontinued.        Fibromyalgia      -resume gabapentin 300 TID            Tobacco abuse    - for tobacco cessation           Debility    -PT and OT was ordered to treat and evaluate. Appreciate assistance.  - recommending discharge home when medically ready         Leukocytosis      -see plan for sepsis           VTE Risk Mitigation (From admission, onward)        Ordered     enoxaparin injection 40 mg  Daily      09/03/18 0722     IP VTE LOW RISK PATIENT  Once      09/02/18 2155     Place sequential compression device  Until discontinued      09/02/18 2155     IP VTE LOW RISK PATIENT  Once      09/02/18 2155              Chiquita Mak MD  Department of Hospital Medicine   [facility]

## 2018-09-10 PROBLEM — R78.81 BACTEREMIA: Status: RESOLVED | Noted: 2018-09-05 | Resolved: 2018-09-10

## 2018-09-10 PROBLEM — R05.9 COUGH: Status: RESOLVED | Noted: 2018-09-02 | Resolved: 2018-09-10

## 2018-09-10 LAB
ALBUMIN SERPL BCP-MCNC: 2.8 G/DL
ALP SERPL-CCNC: 184 U/L
ALT SERPL W/O P-5'-P-CCNC: 98 U/L
ANION GAP SERPL CALC-SCNC: 9 MMOL/L
ANISOCYTOSIS BLD QL SMEAR: SLIGHT
AST SERPL-CCNC: 64 U/L
BASOPHILS # BLD AUTO: ABNORMAL K/UL
BASOPHILS NFR BLD: 0 %
BILIRUB SERPL-MCNC: 0.2 MG/DL
BUN SERPL-MCNC: 14 MG/DL
CALCIUM SERPL-MCNC: 9.2 MG/DL
CHLORIDE SERPL-SCNC: 102 MMOL/L
CO2 SERPL-SCNC: 24 MMOL/L
CREAT SERPL-MCNC: 0.9 MG/DL
DIFFERENTIAL METHOD: ABNORMAL
EOSINOPHIL # BLD AUTO: ABNORMAL K/UL
EOSINOPHIL NFR BLD: 1 %
ERYTHROCYTE [DISTWIDTH] IN BLOOD BY AUTOMATED COUNT: 13.2 %
EST. GFR  (AFRICAN AMERICAN): >60 ML/MIN/1.73 M^2
EST. GFR  (NON AFRICAN AMERICAN): >60 ML/MIN/1.73 M^2
GLUCOSE SERPL-MCNC: 326 MG/DL
HCT VFR BLD AUTO: 32.5 %
HCV AB SERPL QL IA: NEGATIVE
HGB BLD-MCNC: 10.3 G/DL
IMM GRANULOCYTES # BLD AUTO: ABNORMAL K/UL
IMM GRANULOCYTES NFR BLD AUTO: ABNORMAL %
LYMPHOCYTES # BLD AUTO: ABNORMAL K/UL
LYMPHOCYTES NFR BLD: 25 %
MAGNESIUM SERPL-MCNC: 1.8 MG/DL
MCH RBC QN AUTO: 26.8 PG
MCHC RBC AUTO-ENTMCNC: 31.7 G/DL
MCV RBC AUTO: 84 FL
METAMYELOCYTES NFR BLD MANUAL: 2 %
MONOCYTES # BLD AUTO: ABNORMAL K/UL
MONOCYTES NFR BLD: 9 %
NEUTROPHILS NFR BLD: 57 %
NEUTS BAND NFR BLD MANUAL: 6 %
NRBC BLD-RTO: 0 /100 WBC
OVALOCYTES BLD QL SMEAR: ABNORMAL
PHOSPHATE SERPL-MCNC: 4.2 MG/DL
PLATELET # BLD AUTO: 255 K/UL
PMV BLD AUTO: 10.7 FL
POCT GLUCOSE: 145 MG/DL (ref 70–110)
POCT GLUCOSE: 256 MG/DL (ref 70–110)
POCT GLUCOSE: 268 MG/DL (ref 70–110)
POCT GLUCOSE: 338 MG/DL (ref 70–110)
POIKILOCYTOSIS BLD QL SMEAR: SLIGHT
POLYCHROMASIA BLD QL SMEAR: ABNORMAL
POTASSIUM SERPL-SCNC: 4.4 MMOL/L
PROT SERPL-MCNC: 6.4 G/DL
RBC # BLD AUTO: 3.85 M/UL
SODIUM SERPL-SCNC: 135 MMOL/L
WBC # BLD AUTO: 11.32 K/UL

## 2018-09-10 PROCEDURE — 36415 COLL VENOUS BLD VENIPUNCTURE: CPT

## 2018-09-10 PROCEDURE — 25000003 PHARM REV CODE 250: Performed by: STUDENT IN AN ORGANIZED HEALTH CARE EDUCATION/TRAINING PROGRAM

## 2018-09-10 PROCEDURE — 85027 COMPLETE CBC AUTOMATED: CPT

## 2018-09-10 PROCEDURE — 84100 ASSAY OF PHOSPHORUS: CPT

## 2018-09-10 PROCEDURE — 63600175 PHARM REV CODE 636 W HCPCS: Performed by: STUDENT IN AN ORGANIZED HEALTH CARE EDUCATION/TRAINING PROGRAM

## 2018-09-10 PROCEDURE — 94761 N-INVAS EAR/PLS OXIMETRY MLT: CPT

## 2018-09-10 PROCEDURE — 63600175 PHARM REV CODE 636 W HCPCS: Performed by: PHYSICIAN ASSISTANT

## 2018-09-10 PROCEDURE — S4991 NICOTINE PATCH NONLEGEND: HCPCS | Performed by: STUDENT IN AN ORGANIZED HEALTH CARE EDUCATION/TRAINING PROGRAM

## 2018-09-10 PROCEDURE — 85007 BL SMEAR W/DIFF WBC COUNT: CPT

## 2018-09-10 PROCEDURE — 83735 ASSAY OF MAGNESIUM: CPT

## 2018-09-10 PROCEDURE — 25000003 PHARM REV CODE 250: Performed by: PHYSICIAN ASSISTANT

## 2018-09-10 PROCEDURE — 11000001 HC ACUTE MED/SURG PRIVATE ROOM

## 2018-09-10 PROCEDURE — 99232 SBSQ HOSP IP/OBS MODERATE 35: CPT | Mod: ,,, | Performed by: INTERNAL MEDICINE

## 2018-09-10 PROCEDURE — 80053 COMPREHEN METABOLIC PANEL: CPT

## 2018-09-10 RX ORDER — INSULIN ASPART 100 [IU]/ML
1-10 INJECTION, SOLUTION INTRAVENOUS; SUBCUTANEOUS
Status: DISCONTINUED | OUTPATIENT
Start: 2018-09-10 | End: 2018-09-13 | Stop reason: HOSPADM

## 2018-09-10 RX ORDER — MICONAZOLE NITRATE 100 MG/1
100 SUPPOSITORY VAGINAL NIGHTLY
Status: DISCONTINUED | OUTPATIENT
Start: 2018-09-10 | End: 2018-09-13 | Stop reason: HOSPADM

## 2018-09-10 RX ADMIN — INSULIN ASPART 15 UNITS: 100 INJECTION, SOLUTION INTRAVENOUS; SUBCUTANEOUS at 12:09

## 2018-09-10 RX ADMIN — INSULIN ASPART 8 UNITS: 100 INJECTION, SOLUTION INTRAVENOUS; SUBCUTANEOUS at 12:09

## 2018-09-10 RX ADMIN — VANCOMYCIN 1000 MG: 1 INJECTION, SOLUTION INTRAVENOUS at 09:09

## 2018-09-10 RX ADMIN — ENOXAPARIN SODIUM 40 MG: 100 INJECTION SUBCUTANEOUS at 04:09

## 2018-09-10 RX ADMIN — INSULIN DETEMIR 5 UNITS: 100 INJECTION, SOLUTION SUBCUTANEOUS at 12:09

## 2018-09-10 RX ADMIN — AMPICILLIN AND SULBACTAM 3 G: 2; 1 INJECTION, POWDER, FOR SOLUTION INTRAVENOUS at 09:09

## 2018-09-10 RX ADMIN — INSULIN ASPART 15 UNITS: 100 INJECTION, SOLUTION INTRAVENOUS; SUBCUTANEOUS at 04:09

## 2018-09-10 RX ADMIN — DIPHENHYDRAMINE HYDROCHLORIDE 25 MG: 25 CAPSULE ORAL at 01:09

## 2018-09-10 RX ADMIN — GABAPENTIN 300 MG: 300 CAPSULE ORAL at 09:09

## 2018-09-10 RX ADMIN — DIPHENHYDRAMINE HYDROCHLORIDE 25 MG: 25 CAPSULE ORAL at 09:09

## 2018-09-10 RX ADMIN — INSULIN DETEMIR 35 UNITS: 100 INJECTION, SOLUTION SUBCUTANEOUS at 09:09

## 2018-09-10 RX ADMIN — DIPHENHYDRAMINE HYDROCHLORIDE 25 MG: 25 CAPSULE ORAL at 05:09

## 2018-09-10 RX ADMIN — MICONAZOLE NITRATE 100 MG: 100 SUPPOSITORY VAGINAL at 06:09

## 2018-09-10 RX ADMIN — INSULIN ASPART 3 UNITS: 100 INJECTION, SOLUTION INTRAVENOUS; SUBCUTANEOUS at 09:09

## 2018-09-10 RX ADMIN — VANCOMYCIN 1000 MG: 1 INJECTION, SOLUTION INTRAVENOUS at 01:09

## 2018-09-10 RX ADMIN — VANCOMYCIN 1000 MG: 1 INJECTION, SOLUTION INTRAVENOUS at 04:09

## 2018-09-10 RX ADMIN — FLUTICASONE PROPIONATE 50 MCG: 50 SPRAY, METERED NASAL at 09:09

## 2018-09-10 RX ADMIN — HYDROCODONE BITARTRATE AND ACETAMINOPHEN 1 TABLET: 7.5; 325 TABLET ORAL at 09:09

## 2018-09-10 RX ADMIN — INSULIN ASPART 15 UNITS: 100 INJECTION, SOLUTION INTRAVENOUS; SUBCUTANEOUS at 09:09

## 2018-09-10 RX ADMIN — AMPICILLIN AND SULBACTAM 3 G: 2; 1 INJECTION, POWDER, FOR SOLUTION INTRAVENOUS at 04:09

## 2018-09-10 RX ADMIN — AMPICILLIN AND SULBACTAM 3 G: 2; 1 INJECTION, POWDER, FOR SOLUTION INTRAVENOUS at 10:09

## 2018-09-10 RX ADMIN — ACETAMINOPHEN 650 MG: 325 TABLET, FILM COATED ORAL at 09:09

## 2018-09-10 RX ADMIN — DULOXETINE HYDROCHLORIDE 20 MG: 20 CAPSULE, DELAYED RELEASE ORAL at 09:09

## 2018-09-10 RX ADMIN — HYDROCODONE BITARTRATE AND ACETAMINOPHEN 1 TABLET: 7.5; 325 TABLET ORAL at 05:09

## 2018-09-10 RX ADMIN — SENNOSIDES AND DOCUSATE SODIUM 2 TABLET: 8.6; 5 TABLET ORAL at 09:09

## 2018-09-10 RX ADMIN — AMPICILLIN AND SULBACTAM 3 G: 2; 1 INJECTION, POWDER, FOR SOLUTION INTRAVENOUS at 05:09

## 2018-09-10 RX ADMIN — GABAPENTIN 300 MG: 300 CAPSULE ORAL at 04:09

## 2018-09-10 NOTE — PROGRESS NOTES
Food & Nutrition  Education    Diet Education: Diabetic Education  Time Spent: 15 minutes  Learners: Pt      Nutrition Education provided with handouts: Meal Planning Using the Plate Method      Comments: A1c 12.3. Pt reports having some previous Diabetic Diet education. Dicussed CHO serving sizes, non-starchy vegetables, lean protein, and healthy fats. Encouraged pt to avoid skipping meals, stay hydrated, and eat fresh, unprocessed foods. Pt verbalized understanding.       All questions and concerns answered. Dietitian's contact information provided.       Follow-Up: Yes - 9/20/18    Please re-consult as needed.

## 2018-09-10 NOTE — PLAN OF CARE
Problem: Diabetes, Type 2 (Adult)  Goal: Signs and Symptoms of Listed Potential Problems Will be Absent, Minimized or Managed (Diabetes, Type 2)  Signs and symptoms of listed potential problems will be absent, minimized or managed by discharge/transition of care (reference Diabetes, Type 2 (Adult) CPG).  Outcome: Ongoing (interventions implemented as appropriate)  Pt aware of her diabetes management. Accu  checks AC/ HS  With a sliding scale and detemir  Insulin orders.

## 2018-09-10 NOTE — SUBJECTIVE & OBJECTIVE
Interval History: No events overnight. Patient reports feeling well this morning. Has complaints of right sided jaw pain, likely due to dental problems. Patient reports concern about going home today, would prefer tomorrow as this would give her time to inform her roommate.     Review of Systems   Constitutional: Negative for chills, fatigue and fever.   HENT: Positive for dental problem (dental carries causing jaw pain).    Respiratory: Negative for cough, chest tightness and shortness of breath.    Cardiovascular: Negative for chest pain, palpitations and leg swelling.   Gastrointestinal: Negative for abdominal pain, constipation, diarrhea and nausea.   Musculoskeletal: Negative for arthralgias, back pain and myalgias.   Skin: Negative for color change, pallor and rash.   Neurological: Negative for dizziness, weakness and headaches.     Objective:     Vital Signs (Most Recent):  Temp: 98 °F (36.7 °C) (09/10/18 0519)  Pulse: 75 (09/10/18 0519)  Resp: 16 (09/10/18 0519)  BP: 135/74 (09/10/18 0519)  SpO2: 98 % (09/10/18 0721) Vital Signs (24h Range):  Temp:  [96.2 °F (35.7 °C)-98.1 °F (36.7 °C)] 98 °F (36.7 °C)  Pulse:  [75-90] 75  Resp:  [16-17] 16  SpO2:  [95 %-99 %] 98 %  BP: (135-156)/(74-87) 135/74     Weight: 62.6 kg (138 lb)  Body mass index is 24.45 kg/m².    Intake/Output Summary (Last 24 hours) at 9/10/2018 0907  Last data filed at 9/10/2018 0506  Gross per 24 hour   Intake 1700 ml   Output --   Net 1700 ml      Physical Exam   Constitutional: She is oriented to person, place, and time. She appears well-developed and well-nourished. No distress.   HENT:   Head: Normocephalic and atraumatic.   Mouth/Throat: Oropharynx is clear and moist.   Cardiovascular: Normal rate, regular rhythm, normal heart sounds and intact distal pulses.   Pulmonary/Chest: Effort normal and breath sounds normal.   Abdominal: Soft. Bowel sounds are normal.   Musculoskeletal: Normal range of motion. She exhibits no edema or deformity.    Neurological: She is alert and oriented to person, place, and time.   Skin: Skin is warm and dry.   Diabetic foot ulcer on right big toe.   Psychiatric: She has a normal mood and affect. Her behavior is normal.   Vitals reviewed.      Significant Labs:   CBC:   Recent Labs   Lab  09/09/18   0449  09/10/18   0530   WBC  12.60  11.32   HGB  11.6*  10.3*   HCT  34.5*  32.5*   PLT  302  255     CMP:   Recent Labs   Lab  09/09/18   0449  09/10/18   0530   NA  135*  135*   K  3.5  4.4   CL  99  102   CO2  27  24   GLU  302*  326*   BUN  16  14   CREATININE  0.7  0.9   CALCIUM  9.1  9.2   PROT  6.9  6.4   ALBUMIN  2.9*  2.8*   BILITOT  0.2  0.2   ALKPHOS  197*  184*   AST  83*  64*   ALT  109*  98*   ANIONGAP  9  9   EGFRNONAA  >60.0  >60.0     POCT Glucose:   Recent Labs   Lab  09/09/18   1624  09/09/18   2005  09/10/18   0840   POCTGLUCOSE  309*  158*  268*       Significant Imaging: I have reviewed all pertinent imaging results/findings within the past 24 hours.

## 2018-09-10 NOTE — ASSESSMENT & PLAN NOTE
-takes insulin at home, patient think it's 10 units at night. Also on glimepiride BID   -currently she is on detemir 35U BID and scheduled aspart 15U TID with ISS  -continue POCT and BGL; adjust regimen as needed

## 2018-09-10 NOTE — PHYSICIAN QUERY
PT Name: Mariah Foley  MR #: [id number]     Physician Query Form - Documentation Clarification      Micki Ren RN CDS    Contact Information: [phone]    This form is a permanent document in the medical record.     Query Date: September 10, 2018    By submitting this query, we are merely seeking further clarification of documentation. Please utilize your independent clinical judgment when addressing the question(s) below.    The Medical record reflects the following:    Supporting Clinical Findings Location in Medical Record   Uncontrolled DM increasing daily basal and prandial insulin     DM2 (diabetes mellitus, type 2) -takes insulin at home, patient think it's 10 units at night. Also on glimepiride BID   -low correctional scale, detemir 8U qHS, accuchecks qAC qHS   -goal CBG < 180       Insulin aspart SQ As needed     Insulin aspart 15 units SQ 3 times Daily    Insulin determir 30 units SQ 2 times Daily    Insulin determir 32 units SQ 2 times Daily    Insulin determir 35 units SQ 2 times daily   9/4 Hospital Medicine PN      9/2 H&P            9/10 MAR\    9/9-9/10 MAR      9/9 MAR    9/10-9/11 MAR   POC Glucose 286  POC Glucose 293, 321, 176, 270  POC Glucose 203, 247, 285, 239, 219, 273, 262  POC Glucose 309, 121, 193, 292, 232,   POC Glucose 313, 202, 283, 336,   POC Glucose 358, 243, 122, 114  POC Glucose 2903, 41, 137, 309, 158, 268   POC 9/2  POC 9/3  POC 9/5  POC 9/6  POC 9/7  POC 9/8  POC 9/9                                                                            Doctor, Please specify diagnosis or diagnoses associated with above clinical findings.    Provider Use Only      [x  ] Type 2 diabetes mellituswith hyperglycemia  [  ] Other Conditions (Specify)________________________                                                                                                             [  ] Clinically undetermined

## 2018-09-10 NOTE — ASSESSMENT & PLAN NOTE
- AST and ALT is trending up since date of admission.  - Liver US scan showed fatty live with no focal hepatic lesion  - Hep C was negative  - Duloxetine and Cetirizine are discontinued.

## 2018-09-10 NOTE — PLAN OF CARE
Problem: Patient Care Overview  Goal: Plan of Care Review  Outcome: Ongoing (interventions implemented as appropriate)  Pt in bed with no complaints voiced, no acute distress noted at this time.  Frequent assessments ongoing, pain currently controlled.

## 2018-09-10 NOTE — PROGRESS NOTES
[facility]  Infectious Disease  Progress Note    Patient Name: Mariah Foley  MRN: [id number]  Admission Date: 2018  Length of Stay: 8 days  Attending Physician: Joe Helton MD  Primary Care Provider: Lukas Stein MD    Isolation Status: Contact  Assessment/Plan:      Diabetic ulcer of toe of right foot    - Cultures show Prevotella, Veillonella, and Kelsiella   - MRI foot without drainable fluid collections or osteomyelitis - suspect limited to soft tissues  - given cultures growing Veillonella - suspect source of bacteremia - bacteremia cleared on   - 2D echo without vegetations  - CT maxillofacial concerning for infection though no abscess - suspect patient will nee teeth extracted ASAP after DC - suspect chronic and may need po abx until teeth extracted      Plan  - Continue Vancomycin and Unasyn. Vanc trough goal 15-20 - complete 14 d from  for bacteremia and SSTI of toe  - may need po abx until teeth extracted - can be determined in clinic  - PICC placement  - Needs vanc trough prior to next 4th dose then weekly  - Weekly ESR, CRP, CBC, CMP and Vanc troughs on  faxed to ID dept at [phone]  - ID will sign off - fu on  for PICC removal            Anticipated Disposition: tbd    Thank you for your consult. I will sign off. Please contact us if you have any additional questions.    WAQAS Warren  Infectious Disease  [facility]    Subjective:     Principal Problem:Sepsis    HPI: 48 yo F  with pmhx of fibromyalgia and DM2, in her usual state of health until 5 days prior to admission when she developed a sore throat with productive cough, which resolved after 2 days. Patient then developed myalgias, mainly around her left scapula and left CVA which she describes as a muscle stretching pain, 7 of 10 intensity, constant, associated with subjective fever, chills, generalized body aches, headache, sharp right ear paip, nausea, vomiting  (once daily for past 4 days in the morning, nonbloody). No alleviating or aggravating factors. Patient tried ibuprofen for the pain at home which did not help. No chest or abdomian pain, diarrhea, constipation, changes in urinary habits, dysuria, hematuria, vaginal discharge or odors. Not sexually active. Has a ulcer on plantar aspect of right great toe which she says has been there for 2 months and has not noticed any pain, warmth, or discharge from it, although on examination there was obvious purulent discharge once dressing was removed. 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 Denies any recent trauma, sick contacts, travel out of the country.     She was found to have bacteremia with Veillonella parvula.  Urine culture shows MRSA and foot ulcer cultures showed pan sensitive klebsiella, Veillonella parvula, and Prevotella.  She denies any recent fever, chills, sweats, illness, night sweats, or weight loss in the recent past prior to becoming ill.  She reports very bad lower dentition and notices pus pockets in her teeth.  She reports multiple oral infections to the point of even eroding through the skin on her jaw.  Given limited financial resources she has not been able to have the teeth removed and has chronic pain there.  She denies any dysuria, hesitancy, or urgency.   Interval History: No AEON.  Afebrile and WBC WNL.  CT maxillofacial done.  Feels like she is getting a yeast infection.  The patient denies any recent fever, chills, or sweats.      Review of Systems   Constitutional: Negative for chills, diaphoresis and fever.   HENT: Positive for dental problem. Negative for congestion, ear pain, hearing loss, sore throat and tinnitus.    Eyes: Negative for pain, redness and visual disturbance.   Respiratory: Negative for cough and shortness of breath.    Cardiovascular: Negative for chest pain and leg swelling.   Gastrointestinal: Negative for abdominal pain, constipation,  diarrhea and nausea.   Genitourinary: Negative for difficulty urinating, dysuria and hematuria.   Musculoskeletal: Negative for arthralgias, myalgias and neck pain.   Skin: Negative for rash and wound.   Neurological: Negative for dizziness, facial asymmetry, weakness, light-headedness, numbness and headaches.   Psychiatric/Behavioral: Negative for confusion. The patient is not nervous/anxious.      Objective:     Vital Signs (Most Recent):  Temp: 98 °F (36.7 °C) (09/10/18 0519)  Pulse: 75 (09/10/18 0519)  Resp: 16 (09/10/18 0519)  BP: 135/74 (09/10/18 0519)  SpO2: 98 % (09/10/18 0721) Vital Signs (24h Range):  Temp:  [96.2 °F (35.7 °C)-98.1 °F (36.7 °C)] 98 °F (36.7 °C)  Pulse:  [75-90] 75  Resp:  [16-17] 16  SpO2:  [95 %-99 %] 98 %  BP: (135-156)/(74-87) 135/74     Weight: 62.6 kg (138 lb)  Body mass index is 24.45 kg/m².    Estimated Creatinine Clearance: 63.2 mL/min (based on SCr of 0.9 mg/dL).    Physical Exam   Constitutional: She is oriented to person, place, and time. She appears well-developed and well-nourished. No distress.       HENT:   Head: Normocephalic and atraumatic.   Mouth/Throat: Uvula is midline, oropharynx is clear and moist and mucous membranes are normal. She does not have dentures. No oral lesions. Abnormal dentition (all upper teeth missing). Dental caries (multiple cavitary and eroded teeth lower.) present. No dental abscesses or lacerations.   Eyes: Pupils are equal, round, and reactive to light. No scleral icterus.   Cardiovascular: Normal rate, regular rhythm and normal heart sounds.   No murmur heard.  Pulmonary/Chest: Effort normal and breath sounds normal. No respiratory distress. She has no wheezes. She has no rales.   Abdominal: Soft. She exhibits no distension and no mass. There is no hepatosplenomegaly. There is no tenderness.   Musculoskeletal: She exhibits no edema.   Lymphadenopathy:     She has no cervical adenopathy.   Neurological: She is alert and oriented to person,  place, and time.   Skin: Skin is warm, dry and intact. No rash noted.   Right foot ulcer c/d/i. No drainage. No tenderness.    Psychiatric: She has a normal mood and affect. Her behavior is normal.       Significant Labs:   Blood Culture:   Recent Labs   Lab  09/02/18   1921  09/04/18   0807   LABBLOO  Gram stain oscar bottle: Gram negative cocci  Results called to and read back by: Kalie Bradley RN  09/04/2018  07:03  VEILLONELLA PARVULA  Gram stain oscar bottle: Gram Negative Cocci  Results called to and read back by: Kalie Bradley RN  09/04/2018  07:02  VEILLONELLA PARVULA  No growth after 5 days.  No growth after 5 days.     CBC:   Recent Labs   Lab  09/09/18   0449  09/10/18   0530   WBC  12.60  11.32   HGB  11.6*  10.3*   HCT  34.5*  32.5*   PLT  302  255     CMP:   Recent Labs   Lab  09/09/18   0449  09/10/18   0530   NA  135*  135*   K  3.5  4.4   CL  99  102   CO2  27  24   GLU  302*  326*   BUN  16  14   CREATININE  0.7  0.9   CALCIUM  9.1  9.2   PROT  6.9  6.4   ALBUMIN  2.9*  2.8*   BILITOT  0.2  0.2   ALKPHOS  197*  184*   AST  83*  64*   ALT  109*  98*   ANIONGAP  9  9   EGFRNONAA  >60.0  >60.0     Wound Culture:   Recent Labs   Lab  09/02/18   2344   LABAERO  KLEBSIELLA PNEUMONIAE  Many       All pertinent labs within the past 24 hours have been reviewed.    Significant Imaging: I have reviewed all pertinent imaging results/findings within the past 24 hours.   2D echo with color flow doppler   Order: [id number]   Status:  Final result   Visible to patient:  No (Not Released) Next appt:  None Dx:  Bacteremia   Details        Narrative     Date of Procedure: 09/09/2018        TEST DESCRIPTION   Technical Quality: This is a portable study performed at the patient's bedside. This is a technically adequate study.     Aorta: The aortic root is normal in size, measuring 2.9 cm at sinotubular junction and 3.0 cm at Sinuses of Valsalva. The proximal ascending aorta is normal in size, measuring 2.9 cm across.      Left Atrium: The left atrial volume index is normal, measuring 33.72 cc/m2.     Left Ventricle: The left ventricle is normal in size, with an end-diastolic diameter of 4.0 cm, and an end-systolic diameter of 2.3 cm. LV wall thickness is normal, with the septum measuring 0.8 cm and the posterior wall measuring 0.9 cm across. Relative   wall thickness was increased at 0.45, and the LV mass index was 69.4 g/m2 consistent with concentric remodeling. There are no regional wall motion abnormalities. Left ventricular systolic function appears normal. Visually estimated ejection fraction is   60-65%. The LV Doppler derived stroke volume equals 62.0 ccs.     Diastolic indices: E wave velocity 1.0 m/s, E/A ratio 1.2,  msec., E/e' ratio(avg) 9. Diastolic function is normal.     Right Atrium: The right atrium is normal in size, measuring 4.4 cm in length and 3.6 cm in width in the apical view.     Right Ventricle: The right ventricle is normal in size measuring 3.4 cm at the base in the apical right ventricle-focused view. Global right ventricular systolic function appears normal. Tricuspid annular plane systolic excursion (TAPSE) is 2.6 cm.   Tissue Doppler-derived tricuspid annular peak systolic velocity (S prime) is 16.8 cm/s. The estimated PA systolic pressure is 37 mmHg.     Aortic Valve:  Aortic valve is normal in structure with normal leaflet mobility.     Mitral Valve:  Mitral valve is normal in structure with normal leaflet mobility. There is trivial mitral regurgitation.     Tricuspid Valve:  Tricuspid valve is normal in structure with normal leaflet mobility. There is trivial tricuspid regurgitation.     Pulmonary Valve:  Pulmonary valve is normal in structure with normal leaflet mobility.     IVC: IVC is enlarged but collapses > 50% with a sniff, suggesting intermediate right atrial pressure of 8 mmHg.     Intracavitary: There is no evidence of pericardial effusion, intracavity mass, thrombi, or  vegetation.         CONCLUSIONS     1 - Normal left ventricular systolic function (EF 60-65%).     2 - No wall motion abnormalities.     3 - Normal left ventricular diastolic function.     4 - Normal right ventricular systolic function .     5 - Trivial mitral regurgitation.     6 - Trivial tricuspid regurgitation.     7 - The estimated PA systolic pressure is 37 mmHg.     8 - No evidence of vegetation.             This document has been electronically    SIGNED BY: Mumtaz Reno MD On: 09/09/2018 11:25             CT Maxillofacial With Contrast [[id number]] Resulted: 09/09/18 0045   Order Status: Completed Updated: 09/09/18 0047   Narrative:     EXAMINATION:  CT MAXILLOFACIAL WITH CONTRAST    CLINICAL HISTORY:  Known dental abscesses with mouth jose e growing in bacteremia;    TECHNIQUE:  2.5 mm images were acquired of the maxilla facial region following the administration of 75 cc omni 350 IV contrast.    COMPARISON:  None    FINDINGS:  The visualized intracranial structures appear within normal limits.  The paranasal sinuses appear well aerated.  The mastoid air cells appear within normal limits.  The globes are intact.  No abnormal retro bulbar soft tissue stranding or inflammatory change.    There is no evidence of maxillofacial fracture.  The mandibular condyles appear appropriately located.  There there is absent dentition throughout the maxilla.  There are multiple periapical lucencies about the remaining mandibular dentition in keeping with odontogenic disease.  No discrete drainable periodontal fluid collection appreciated.  No focal fluid collections appreciated within the subcutaneous soft tissues.    There are scattered mildly prominent submental and sublingual lymph nodes which may be reactive in etiology.  There are scattered upper limit of normal bilateral cervical chain lymph nodes, although none of which appear enlarged by CT criteria.  The parapharyngeal fat planes are well preserved.  The  visualized vascular structures appear patent.  Visualized parotid and submandibular glands are unremarkable.  Visualized portion of the thyroid gland appears within normal limits.  There are mild degenerative changes of the visualized cervical spine.   Impression:       1.  Numerous absent dentition with multiple periapical lucency about the remaining mandibular dictation in keeping with odontogenic disease.  No discrete drainable periodontal fluid collection appreciated by CT.    2.  Multiple mildly prominent submental/sublingual lymph nodes which may be reactive in etiology.      Electronically signed by: Vinay Solis MD  Date: 09/09/2018  Time: 00:45   US Abdomen Limited [[id number]] Resulted: 09/08/18 1933   Order Status: Completed Updated: 09/08/18 1936   Narrative:     EXAMINATION:  US ABDOMEN LIMITED    CLINICAL HISTORY:  RUQ ultrasound to evaluate progressive elevated transaminases    TECHNIQUE:  Limited abdominal ultrasound (including pancreas, liver, gallbladder, common bile duct, spleen, and IVC) Was performed.    COMPARISON:  CT renal stone study 11/30/2012    FINDINGS:  Liver: Normal in size, measuring 15.1 cm. Fatty liver infiltration, with an HRI measuring 1.44.  No focal hepatic lesions.    Gallbladder: No calculi, wall thickening, or pericholecystic fluid.  No sonographic Matos's sign.    Biliary system: The common duct is not dilated, measuring 3 mm.  No intrahepatic ductal dilatation.    Spleen: Normal in size with a homogeneous echotexture, measuring 11.7 x 4.5 cm.  There are collateral vessels within the hilum.    Pancreas: The visualized portions of pancreas appear normal.    Miscellaneous: No ascites.   Impression:       Findings suggestive of hepatic steatosis.    Small perisplenic collaterals.    Electronically signed by resident: Taurus Denny  Date: 09/08/2018  Time: 18:48    Electronically signed by: Shant Montaño MD  Date: 09/08/2018  Time: 19:33   US Lower Extrem Arteries Bilat  with ANSON (xpd) [[id number]] Resulted: 09/04/18 1208   Order Status: Completed Updated: 09/04/18 1210   Narrative:     EXAMINATION:  US ARTERIAL LOWER EXTREMITY BILAT WITH ANSON (XPD)    CLINICAL HISTORY:  Toe wound;    TECHNIQUE:  Bilateral lower extremity arterial duplex ultrasound examination performed. Multiple gray scale and color doppler images were obtained in addition to waveform analysis.  Ankle-brachial indices were calculated.    COMPARISON:  No applicable prior imaging available for comparison.    FINDINGS:  The ankle brachial index on the right is 1.1 and on the left is 1.1.    The peak systolic velocities on the right are as follows, in centimeters/second:    Common femoral artery: 117    Superficial femoral artery, proximal: 80    Superficial femoral artery, mid portion: 88    Superficial femoral artery, distal: 82    Popliteal artery: 75    Posterior tibial artery: 72    Anterior tibial artery: 47    The peak systolic velocities on the left are as follows, in centimeters/second:    Common femoral artery: 114    Superficial femoral artery, proximal: 106    Superficial femoral artery, mid portion: 96    Superficial femoral artery, distal: 75    Popliteal artery: 62    Posterior tibial artery: 75    Anterior tibial artery: 67    Normal triphasic arterial waveforms are demonstrated.   Impression:       Normal ankle brachial indices of 1.1 on the right and 1.1 on the left.    No hemodynamically significant stenosis demonstrated in the right or left lower extremity arterial system.    Electronically signed by resident: Kenn Dick MD  Date: 09/04/2018  Time: 11:44    Electronically signed by: Chilango Flower MD  Date: 09/04/2018  Time: 12:08   MRI Foot Toes W WO Contrast Right [[id number]] Resulted: 09/03/18 1647   Order Status: Completed Updated: 09/03/18 1649   Narrative:     EXAMINATION:  MRI FOOT TOES W WO CONTRAST RIGHT    CLINICAL HISTORY:  r/o osteomyelitis - diabetic foot ulcer right great  toe.;    TECHNIQUE:  Multiplanar, multisequence MR imaging of the right forefoot before and after the administration of 6 cc of gadolinium based intravenous contrast.    COMPARISON:  No relevant prior.    FINDINGS:  There is a small soft tissue defect in the skin of the plantar right 1st great toe, likely representing the patient's known diabetic foot ulcer.  There is mildly increased stir signal and minimal postcontrast enhancement of the subjacent soft tissues, compatible with edema.  No drainable fluid collection identified.  The underlying 1st digit appears intact without any abnormal signal or postcontrast enhancement to suggest osteomyelitis.  Remaining soft tissue structures of the foot are unremarkable.   Impression:       Right 1st great toe ulcer with subjacent edema.  No evidence of acute osteomyelitis or drainable fluid collection.    Electronically signed by resident: Malachi Sands  Date: 09/03/2018  Time: 16:28    Electronically signed by: Mónica Jasso MD  Date: 09/03/2018  Time: 16:47

## 2018-09-10 NOTE — ASSESSMENT & PLAN NOTE
- podiatry consulted  - MRI of R foot showed no evidence of osteomyelitis or drainable fluid collection.  - debridement was performed by podiatry (on 9/4) and wound has dressed properly.  - wound culture showed gram negative rods lactose  (Klebsiella), Pravotella bivia and Veillonella parvula.  - Continue on Vanc and discontinue Zosyn. Started on Unasyn on 9/7.  - f/u Podiatry recs.  - f/u ID recs.

## 2018-09-10 NOTE — PROGRESS NOTES
[facility] Hospital Medicine  Progress Note    Patient Name: Mariah Foley  MRN: [id number]  Patient Class: IP- Inpatient   Admission Date: 2018  Length of Stay: 8 days  Attending Physician: Joe Helton MD  Primary Care Provider: Lukas Stein MD    [facility] Medicine Team: [facility] [facility] 5 Homer Mcgrath MD    Subjective:     Principal Problem:Sepsis    HPI:  48 yo F  with pmhx of fibromyalgia and DM2, in her usual state of health until 5 days prior to admission when she developed a sore throat with productive cough, which resolved after 2 days. Patient then developed myalgias, mainly around her left scapula and left CVA which she describes as a muscle stretching pain, 7 of 10 intensity, constant, associated with subjective fever, chills, generalized body aches, headache, sharp right ear paip, nausea, vomiting (once daily for past 4 days in the morning, nonbloody). No alleviating or aggravating factors. Patient tried ibuprofen for the pain at home which did not help. No chest or abdomian pain, diarrhea, constipation, changes in urinary habits, dysuria, hematuria, vaginal discharge or odors. Not sexually active. Has a ulcer on plantar aspect of right great toe which she says has been there for 2 months and has not noticed any pain, warmth, or discharge from it, although on examination there was obvious purulent discharge once dressing was removed. 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 Denies any recent trauma, sick contacts, travel out of the country.     Hospital Course:  9/3- Patient seen today and she is vitally stable except of low BP 97/54. Waiting for the MRI result of the R great toe. Continue on Vanc and Zosyn and f/u with Vanc trough tomorrow AM.  - NAEON, patient seen today and she is vitally stable. MRI of R great toe planter ulcer showed no evidence of osteomyelitis or drainable fluid collection. Wound culture showed gram negative  rods lactose . Urine culture showed staph aureus. Blood culture showed gram negative cocci. Repeat blood culture and continue on broad spectrum Abx (Vanc and Zosyn). Podiatry is following and debridment was performed today. Patient has productive cough, headache and congestion. Cetirizine and Fluticasone nasal spray was ordered. Insulin dose adjusted (Detemir 10U BID and Aspart 5U TID WM).  9/5 - no acute events. Awaiting further speciation  9/6- NAEON, patient seen today and she is vitally stable. Continue on Vanc and Zosyn and f/u with Vanc trough tomorrow AM. Adjust insulin regimen (detemir 15U BID, aspart 8U TID and continue on low dose ISS), f/u Podiatry recs.  9/7- NAEON, patient seen today and she is vitally stable. Continue on Vanc and Zosyn and f/u with Vanc trough. Adjust insulin regimen to (detemir 20U BID and aspart 10U TID). F/u Podiatry recs.  9/8- NAEON, patient seen today and she is vitally stable. CT maxillofacial and 2d echo was ordered to rule out bacteremia complications. Zosyn was discontinued. Continue on Vanc and Unasyn.  9/9- NAEON, patient is vitally stable. CT maxillofacial showed no drainable periodontal fluid collection. Waiting for 2d echo. Patient has itching, Benadryl was ordered prn, Hydrocodone dose was decreased. Midline placement was ordered.  9/10- NAEON, patient is stable. Patient being prepared for discharge. 2D echo showed no vegetations. Per ID's recommendation patient will be discharged with vanc and unasyn. Consult for PICC line placed as any duration of vanc requires it and unaysn is given continuously over a 24 hour period. PICC team consulted, told they will place line tomorrow. In addition, patient on 35 units BID of basal insulin, additional 5 units added for better glucose control.    Interval History: No events overnight. Patient reports feeling well this morning. Has complaints of right sided jaw pain, likely due to dental problems. Patient reports concern  about going home today, would prefer tomorrow as this would give her time to inform her roommate.     Review of Systems   Constitutional: Negative for chills, fatigue and fever.   HENT: Positive for dental problem (dental carries causing jaw pain).    Respiratory: Negative for cough, chest tightness and shortness of breath.    Cardiovascular: Negative for chest pain, palpitations and leg swelling.   Gastrointestinal: Negative for abdominal pain, constipation, diarrhea and nausea.   Musculoskeletal: Negative for arthralgias, back pain and myalgias.   Skin: Negative for color change, pallor and rash.   Neurological: Negative for dizziness, weakness and headaches.     Objective:     Vital Signs (Most Recent):  Temp: 98 °F (36.7 °C) (09/10/18 0519)  Pulse: 75 (09/10/18 0519)  Resp: 16 (09/10/18 0519)  BP: 135/74 (09/10/18 0519)  SpO2: 98 % (09/10/18 0721) Vital Signs (24h Range):  Temp:  [96.2 °F (35.7 °C)-98.1 °F (36.7 °C)] 98 °F (36.7 °C)  Pulse:  [75-90] 75  Resp:  [16-17] 16  SpO2:  [95 %-99 %] 98 %  BP: (135-156)/(74-87) 135/74     Weight: 62.6 kg (138 lb)  Body mass index is 24.45 kg/m².    Intake/Output Summary (Last 24 hours) at 9/10/2018 0907  Last data filed at 9/10/2018 0506  Gross per 24 hour   Intake 1700 ml   Output --   Net 1700 ml      Physical Exam   Constitutional: She is oriented to person, place, and time. She appears well-developed and well-nourished. No distress.   HENT:   Head: Normocephalic and atraumatic.   Mouth/Throat: Oropharynx is clear and moist.   Cardiovascular: Normal rate, regular rhythm, normal heart sounds and intact distal pulses.   Pulmonary/Chest: Effort normal and breath sounds normal.   Abdominal: Soft. Bowel sounds are normal.   Musculoskeletal: Normal range of motion. She exhibits no edema or deformity.   Neurological: She is alert and oriented to person, place, and time.   Skin: Skin is warm and dry.   Diabetic foot ulcer on right big toe.   Psychiatric: She has a normal mood  and affect. Her behavior is normal.   Vitals reviewed.      Significant Labs:   CBC:   Recent Labs   Lab  09/09/18   0449  09/10/18   0530   WBC  12.60  11.32   HGB  11.6*  10.3*   HCT  34.5*  32.5*   PLT  302  255     CMP:   Recent Labs   Lab  09/09/18   0449  09/10/18   0530   NA  135*  135*   K  3.5  4.4   CL  99  102   CO2  27  24   GLU  302*  326*   BUN  16  14   CREATININE  0.7  0.9   CALCIUM  9.1  9.2   PROT  6.9  6.4   ALBUMIN  2.9*  2.8*   BILITOT  0.2  0.2   ALKPHOS  197*  184*   AST  83*  64*   ALT  109*  98*   ANIONGAP  9  9   EGFRNONAA  >60.0  >60.0     POCT Glucose:   Recent Labs   Lab  09/09/18   1624  09/09/18   2005  09/10/18   0840   POCTGLUCOSE  309*  158*  268*       Significant Imaging: I have reviewed all pertinent imaging results/findings within the past 24 hours.    Assessment/Plan:      * Sepsis    Bacteremia  Diabetic foot culture  Urinary tract infection  - presented with 3/4 SIRS. Rapid strep, influenza, HIV, and monospot testing negative.   - etiologies multifactorial including MRSA in urine culture (9/2), gram negative dionicio in blood culture 9/2/18, and klebsiella from right food ulcer 9/2/18  - CT maxillofacial showed no drainable periodontal fluid collection.  - 2d echo showed no cardiac vegetations  - continue on Vanc and started on Unasyn on 9/7, currently Day 3  - f/u infectious diseases recommendations, still waiting on sensitivities of veillonella   - appreciate further Podiatry recs        Liver injury    - AST and ALT is trending up since date of admission.  - Liver US scan showed fatty live with no focal hepatic lesion  - Hep C was negative  - Duloxetine and Cetirizine are discontinued.        Debility    -PT and OT was ordered to treat and evaluate. Appreciate assistance.  - recommending discharge home when medically ready        Fibromyalgia      -resume gabapentin 300 TID            DM2 (diabetes mellitus, type 2)    -takes insulin at home, patient think it's 10 units at  night. Also on glimepiride BID   -currently she is on detemir 35U BID and scheduled aspart 15U TID with ISS  -continue POCT and BGL; adjust regimen as needed        Tobacco abuse    - for tobacco cessation           UTI (urinary tract infection)    - rocephin given in ED for possible UTI   - urine culture showed MRSA, no symptoms or signs of UTI.  - continue on broad spectrum Abx         Leukocytosis      -see plan for sepsis         Diabetic ulcer of toe of right foot    - podiatry consulted  - MRI of R foot showed no evidence of osteomyelitis or drainable fluid collection.  - debridement was performed by podiatry (on 9/4) and wound has dressed properly.  - wound culture showed gram negative rods lactose  (Klebsiella), Pravotella bivia and Veillonella parvula.  - Continue on Vanc and discontinue Zosyn. Started on Unasyn on 9/7.  - f/u Podiatry recs.  - f/u ID recs.          VTE Risk Mitigation (From admission, onward)        Ordered     enoxaparin injection 40 mg  Daily      09/03/18 0722     IP VTE LOW RISK PATIENT  Once      09/02/18 2155     Place sequential compression device  Until discontinued      09/02/18 2155     IP VTE LOW RISK PATIENT  Once      09/02/18 2155              Homer Mcgrath MD  Department of Hospital Medicine   [facility]

## 2018-09-10 NOTE — ASSESSMENT & PLAN NOTE
Bacteremia  Diabetic foot culture  Urinary tract infection  - presented with 3/4 SIRS. Rapid strep, influenza, HIV, and monospot testing negative.   - etiologies multifactorial including MRSA in urine culture (9/2), gram negative dionicio in blood culture 9/2/18, and klebsiella from right food ulcer 9/2/18  - CT maxillofacial showed no drainable periodontal fluid collection.  - 2d echo showed no cardiac vegetations  - continue on Vanc and started on Unasyn on 9/7, currently Day 3  - f/u infectious diseases recommendations, still waiting on sensitivities of veillonella   - appreciate further Podiatry recs

## 2018-09-10 NOTE — SUBJECTIVE & OBJECTIVE
Interval History: No AEON.  Afebrile and WBC WNL.  CT maxillofacial done.  Feels like she is getting a yeast infection.  The patient denies any recent fever, chills, or sweats.      Review of Systems   Constitutional: Negative for chills, diaphoresis and fever.   HENT: Positive for dental problem. Negative for congestion, ear pain, hearing loss, sore throat and tinnitus.    Eyes: Negative for pain, redness and visual disturbance.   Respiratory: Negative for cough and shortness of breath.    Cardiovascular: Negative for chest pain and leg swelling.   Gastrointestinal: Negative for abdominal pain, constipation, diarrhea and nausea.   Genitourinary: Negative for difficulty urinating, dysuria and hematuria.   Musculoskeletal: Negative for arthralgias, myalgias and neck pain.   Skin: Negative for rash and wound.   Neurological: Negative for dizziness, facial asymmetry, weakness, light-headedness, numbness and headaches.   Psychiatric/Behavioral: Negative for confusion. The patient is not nervous/anxious.      Objective:     Vital Signs (Most Recent):  Temp: 98 °F (36.7 °C) (09/10/18 0519)  Pulse: 75 (09/10/18 0519)  Resp: 16 (09/10/18 0519)  BP: 135/74 (09/10/18 0519)  SpO2: 98 % (09/10/18 0721) Vital Signs (24h Range):  Temp:  [96.2 °F (35.7 °C)-98.1 °F (36.7 °C)] 98 °F (36.7 °C)  Pulse:  [75-90] 75  Resp:  [16-17] 16  SpO2:  [95 %-99 %] 98 %  BP: (135-156)/(74-87) 135/74     Weight: 62.6 kg (138 lb)  Body mass index is 24.45 kg/m².    Estimated Creatinine Clearance: 63.2 mL/min (based on SCr of 0.9 mg/dL).    Physical Exam   Constitutional: She is oriented to person, place, and time. She appears well-developed and well-nourished. No distress.       HENT:   Head: Normocephalic and atraumatic.   Mouth/Throat: Uvula is midline, oropharynx is clear and moist and mucous membranes are normal. She does not have dentures. No oral lesions. Abnormal dentition (all upper teeth missing). Dental caries (multiple cavitary and eroded  teeth lower.) present. No dental abscesses or lacerations.   Eyes: Pupils are equal, round, and reactive to light. No scleral icterus.   Cardiovascular: Normal rate, regular rhythm and normal heart sounds.   No murmur heard.  Pulmonary/Chest: Effort normal and breath sounds normal. No respiratory distress. She has no wheezes. She has no rales.   Abdominal: Soft. She exhibits no distension and no mass. There is no hepatosplenomegaly. There is no tenderness.   Musculoskeletal: She exhibits no edema.   Lymphadenopathy:     She has no cervical adenopathy.   Neurological: She is alert and oriented to person, place, and time.   Skin: Skin is warm, dry and intact. No rash noted.   Right foot ulcer c/d/i. No drainage. No tenderness.    Psychiatric: She has a normal mood and affect. Her behavior is normal.       Significant Labs:   Blood Culture:   Recent Labs   Lab  09/02/18 1921 09/04/18   0807   LABBLOO  Gram stain oscar bottle: Gram negative cocci  Results called to and read back by: Kalie Bradley RN  09/04/2018  07:03  VEILLONELLA PARVULA  Gram stain oscar bottle: Gram Negative Cocci  Results called to and read back by: Kalie Bradley RN  09/04/2018  07:02  VEILLONELLA PARVULA  No growth after 5 days.  No growth after 5 days.     CBC:   Recent Labs   Lab  09/09/18   0449  09/10/18   0530   WBC  12.60  11.32   HGB  11.6*  10.3*   HCT  34.5*  32.5*   PLT  302  255     CMP:   Recent Labs   Lab  09/09/18   0449  09/10/18   0530   NA  135*  135*   K  3.5  4.4   CL  99  102   CO2  27  24   GLU  302*  326*   BUN  16  14   CREATININE  0.7  0.9   CALCIUM  9.1  9.2   PROT  6.9  6.4   ALBUMIN  2.9*  2.8*   BILITOT  0.2  0.2   ALKPHOS  197*  184*   AST  83*  64*   ALT  109*  98*   ANIONGAP  9  9   EGFRNONAA  >60.0  >60.0     Wound Culture:   Recent Labs   Lab  09/02/18   2344   LABAERO  KLEBSIELLA PNEUMONIAE  Many       All pertinent labs within the past 24 hours have been reviewed.    Significant Imaging: I have reviewed all  pertinent imaging results/findings within the past 24 hours.   2D echo with color flow doppler   Order: [id number]   Status:  Final result   Visible to patient:  No (Not Released) Next appt:  None Dx:  Bacteremia   Details        Narrative     Date of Procedure: 09/09/2018        TEST DESCRIPTION   Technical Quality: This is a portable study performed at the patient's bedside. This is a technically adequate study.     Aorta: The aortic root is normal in size, measuring 2.9 cm at sinotubular junction and 3.0 cm at Sinuses of Valsalva. The proximal ascending aorta is normal in size, measuring 2.9 cm across.     Left Atrium: The left atrial volume index is normal, measuring 33.72 cc/m2.     Left Ventricle: The left ventricle is normal in size, with an end-diastolic diameter of 4.0 cm, and an end-systolic diameter of 2.3 cm. LV wall thickness is normal, with the septum measuring 0.8 cm and the posterior wall measuring 0.9 cm across. Relative   wall thickness was increased at 0.45, and the LV mass index was 69.4 g/m2 consistent with concentric remodeling. There are no regional wall motion abnormalities. Left ventricular systolic function appears normal. Visually estimated ejection fraction is   60-65%. The LV Doppler derived stroke volume equals 62.0 ccs.     Diastolic indices: E wave velocity 1.0 m/s, E/A ratio 1.2,  msec., E/e' ratio(avg) 9. Diastolic function is normal.     Right Atrium: The right atrium is normal in size, measuring 4.4 cm in length and 3.6 cm in width in the apical view.     Right Ventricle: The right ventricle is normal in size measuring 3.4 cm at the base in the apical right ventricle-focused view. Global right ventricular systolic function appears normal. Tricuspid annular plane systolic excursion (TAPSE) is 2.6 cm.   Tissue Doppler-derived tricuspid annular peak systolic velocity (S prime) is 16.8 cm/s. The estimated PA systolic pressure is 37 mmHg.     Aortic Valve:  Aortic valve is normal  in structure with normal leaflet mobility.     Mitral Valve:  Mitral valve is normal in structure with normal leaflet mobility. There is trivial mitral regurgitation.     Tricuspid Valve:  Tricuspid valve is normal in structure with normal leaflet mobility. There is trivial tricuspid regurgitation.     Pulmonary Valve:  Pulmonary valve is normal in structure with normal leaflet mobility.     IVC: IVC is enlarged but collapses > 50% with a sniff, suggesting intermediate right atrial pressure of 8 mmHg.     Intracavitary: There is no evidence of pericardial effusion, intracavity mass, thrombi, or vegetation.         CONCLUSIONS     1 - Normal left ventricular systolic function (EF 60-65%).     2 - No wall motion abnormalities.     3 - Normal left ventricular diastolic function.     4 - Normal right ventricular systolic function .     5 - Trivial mitral regurgitation.     6 - Trivial tricuspid regurgitation.     7 - The estimated PA systolic pressure is 37 mmHg.     8 - No evidence of vegetation.             This document has been electronically    SIGNED BY: Mumtaz Reno MD On: 09/09/2018 11:25             CT Maxillofacial With Contrast [[id number]] Resulted: 09/09/18 0045   Order Status: Completed Updated: 09/09/18 0047   Narrative:     EXAMINATION:  CT MAXILLOFACIAL WITH CONTRAST    CLINICAL HISTORY:  Known dental abscesses with mouth jose e growing in bacteremia;    TECHNIQUE:  2.5 mm images were acquired of the maxilla facial region following the administration of 75 cc omni 350 IV contrast.    COMPARISON:  None    FINDINGS:  The visualized intracranial structures appear within normal limits.  The paranasal sinuses appear well aerated.  The mastoid air cells appear within normal limits.  The globes are intact.  No abnormal retro bulbar soft tissue stranding or inflammatory change.    There is no evidence of maxillofacial fracture.  The mandibular condyles appear appropriately located.  There there is absent  dentition throughout the maxilla.  There are multiple periapical lucencies about the remaining mandibular dentition in keeping with odontogenic disease.  No discrete drainable periodontal fluid collection appreciated.  No focal fluid collections appreciated within the subcutaneous soft tissues.    There are scattered mildly prominent submental and sublingual lymph nodes which may be reactive in etiology.  There are scattered upper limit of normal bilateral cervical chain lymph nodes, although none of which appear enlarged by CT criteria.  The parapharyngeal fat planes are well preserved.  The visualized vascular structures appear patent.  Visualized parotid and submandibular glands are unremarkable.  Visualized portion of the thyroid gland appears within normal limits.  There are mild degenerative changes of the visualized cervical spine.   Impression:       1.  Numerous absent dentition with multiple periapical lucency about the remaining mandibular dictation in keeping with odontogenic disease.  No discrete drainable periodontal fluid collection appreciated by CT.    2.  Multiple mildly prominent submental/sublingual lymph nodes which may be reactive in etiology.      Electronically signed by: Vinay Solis MD  Date: 09/09/2018  Time: 00:45   US Abdomen Limited [[id number]] Resulted: 09/08/18 1933   Order Status: Completed Updated: 09/08/18 1936   Narrative:     EXAMINATION:  US ABDOMEN LIMITED    CLINICAL HISTORY:  RUQ ultrasound to evaluate progressive elevated transaminases    TECHNIQUE:  Limited abdominal ultrasound (including pancreas, liver, gallbladder, common bile duct, spleen, and IVC) Was performed.    COMPARISON:  CT renal stone study 11/30/2012    FINDINGS:  Liver: Normal in size, measuring 15.1 cm. Fatty liver infiltration, with an HRI measuring 1.44.  No focal hepatic lesions.    Gallbladder: No calculi, wall thickening, or pericholecystic fluid.  No sonographic Matos's sign.    Biliary system: The  common duct is not dilated, measuring 3 mm.  No intrahepatic ductal dilatation.    Spleen: Normal in size with a homogeneous echotexture, measuring 11.7 x 4.5 cm.  There are collateral vessels within the hilum.    Pancreas: The visualized portions of pancreas appear normal.    Miscellaneous: No ascites.   Impression:       Findings suggestive of hepatic steatosis.    Small perisplenic collaterals.    Electronically signed by resident: Taurus Denny  Date: 09/08/2018  Time: 18:48    Electronically signed by: Shant Montaño MD  Date: 09/08/2018  Time: 19:33   US Lower Extrem Arteries Bilat with ANSON (xpd) [[id number]] Resulted: 09/04/18 1208   Order Status: Completed Updated: 09/04/18 1210   Narrative:     EXAMINATION:  US ARTERIAL LOWER EXTREMITY BILAT WITH ANSON (XPD)    CLINICAL HISTORY:  Toe wound;    TECHNIQUE:  Bilateral lower extremity arterial duplex ultrasound examination performed. Multiple gray scale and color doppler images were obtained in addition to waveform analysis.  Ankle-brachial indices were calculated.    COMPARISON:  No applicable prior imaging available for comparison.    FINDINGS:  The ankle brachial index on the right is 1.1 and on the left is 1.1.    The peak systolic velocities on the right are as follows, in centimeters/second:    Common femoral artery: 117    Superficial femoral artery, proximal: 80    Superficial femoral artery, mid portion: 88    Superficial femoral artery, distal: 82    Popliteal artery: 75    Posterior tibial artery: 72    Anterior tibial artery: 47    The peak systolic velocities on the left are as follows, in centimeters/second:    Common femoral artery: 114    Superficial femoral artery, proximal: 106    Superficial femoral artery, mid portion: 96    Superficial femoral artery, distal: 75    Popliteal artery: 62    Posterior tibial artery: 75    Anterior tibial artery: 67    Normal triphasic arterial waveforms are demonstrated.   Impression:       Normal ankle  brachial indices of 1.1 on the right and 1.1 on the left.    No hemodynamically significant stenosis demonstrated in the right or left lower extremity arterial system.    Electronically signed by resident: Kenn Dick MD  Date: 09/04/2018  Time: 11:44    Electronically signed by: Chilango Flower MD  Date: 09/04/2018  Time: 12:08   MRI Foot Toes W WO Contrast Right [[id number]] Resulted: 09/03/18 1647   Order Status: Completed Updated: 09/03/18 1649   Narrative:     EXAMINATION:  MRI FOOT TOES W WO CONTRAST RIGHT    CLINICAL HISTORY:  r/o osteomyelitis - diabetic foot ulcer right great toe.;    TECHNIQUE:  Multiplanar, multisequence MR imaging of the right forefoot before and after the administration of 6 cc of gadolinium based intravenous contrast.    COMPARISON:  No relevant prior.    FINDINGS:  There is a small soft tissue defect in the skin of the plantar right 1st great toe, likely representing the patient's known diabetic foot ulcer.  There is mildly increased stir signal and minimal postcontrast enhancement of the subjacent soft tissues, compatible with edema.  No drainable fluid collection identified.  The underlying 1st digit appears intact without any abnormal signal or postcontrast enhancement to suggest osteomyelitis.  Remaining soft tissue structures of the foot are unremarkable.   Impression:       Right 1st great toe ulcer with subjacent edema.  No evidence of acute osteomyelitis or drainable fluid collection.    Electronically signed by resident: Malachi Sands  Date: 09/03/2018  Time: 16:28    Electronically signed by: Mónica Jasso MD  Date: 09/03/2018  Time: 16:47

## 2018-09-10 NOTE — ASSESSMENT & PLAN NOTE
- Cultures show Prevotella, Veillonella, and Kelsiella   - MRI foot without drainable fluid collections or osteomyelitis - suspect limited to soft tissues  - given cultures growing Veillonella - suspect source of bacteremia - bacteremia cleared on 9/4  - 2D echo without vegetations  - CT maxillofacial concerning for infection though no abscess - suspect patient will nee teeth extracted ASAP after DC - suspect chronic and may need po abx until teeth extracted      Plan  - Continue Vancomycin and Unasyn. Vanc trough goal 15-20 - complete 14 d from 9/4 for bacteremia and SSTI of toe  - may need po abx until teeth extracted - can be determined in clinic  - PICC placement  - Needs vanc trough prior to next 4th dose then weekly  - Weekly ESR, CRP, CBC, CMP and Vanc troughs on Mondays faxed to ID dept at [phone]  - ID will sign off - fu on 4/19 for PICC removal

## 2018-09-10 NOTE — PLAN OF CARE
09/10/18 1003   Discharge Reassessment   Assessment Type Discharge Planning Reassessment   Provided patient/caregiver education on the expected discharge date and the discharge plan Yes   Do you have any problems affording any of your prescribed medications? No   Discharge Plan A Home with family   Discharge Plan B Home with family;Home Health

## 2018-09-11 PROBLEM — I10 HYPERTENSION: Status: ACTIVE | Noted: 2018-09-11

## 2018-09-11 LAB
ALBUMIN SERPL BCP-MCNC: 2.9 G/DL
ALP SERPL-CCNC: 188 U/L
ALT SERPL W/O P-5'-P-CCNC: 89 U/L
ANION GAP SERPL CALC-SCNC: 10 MMOL/L
ANISOCYTOSIS BLD QL SMEAR: SLIGHT
AST SERPL-CCNC: 48 U/L
BASOPHILS # BLD AUTO: ABNORMAL K/UL
BASOPHILS NFR BLD: 0 %
BILIRUB SERPL-MCNC: 0.2 MG/DL
BUN SERPL-MCNC: 12 MG/DL
CALCIUM SERPL-MCNC: 9.3 MG/DL
CHLORIDE SERPL-SCNC: 99 MMOL/L
CO2 SERPL-SCNC: 26 MMOL/L
CREAT SERPL-MCNC: 0.8 MG/DL
DIFFERENTIAL METHOD: ABNORMAL
EOSINOPHIL # BLD AUTO: ABNORMAL K/UL
EOSINOPHIL NFR BLD: 5 %
ERYTHROCYTE [DISTWIDTH] IN BLOOD BY AUTOMATED COUNT: 13.2 %
EST. GFR  (AFRICAN AMERICAN): >60 ML/MIN/1.73 M^2
EST. GFR  (NON AFRICAN AMERICAN): >60 ML/MIN/1.73 M^2
GLUCOSE SERPL-MCNC: 353 MG/DL
HCT VFR BLD AUTO: 32.3 %
HGB BLD-MCNC: 10.5 G/DL
HYPOCHROMIA BLD QL SMEAR: ABNORMAL
IMM GRANULOCYTES # BLD AUTO: ABNORMAL K/UL
IMM GRANULOCYTES NFR BLD AUTO: ABNORMAL %
LYMPHOCYTES # BLD AUTO: ABNORMAL K/UL
LYMPHOCYTES NFR BLD: 20 %
MAGNESIUM SERPL-MCNC: 1.9 MG/DL
MCH RBC QN AUTO: 27.3 PG
MCHC RBC AUTO-ENTMCNC: 32.5 G/DL
MCV RBC AUTO: 84 FL
METAMYELOCYTES NFR BLD MANUAL: 1 %
MONOCYTES # BLD AUTO: ABNORMAL K/UL
MONOCYTES NFR BLD: 11 %
MYELOCYTES NFR BLD MANUAL: 1 %
NEUTROPHILS NFR BLD: 61 %
NEUTS BAND NFR BLD MANUAL: 1 %
NRBC BLD-RTO: 0 /100 WBC
OVALOCYTES BLD QL SMEAR: ABNORMAL
PHOSPHATE SERPL-MCNC: 4 MG/DL
PLATELET # BLD AUTO: 291 K/UL
PMV BLD AUTO: 11.2 FL
POCT GLUCOSE: 145 MG/DL (ref 70–110)
POCT GLUCOSE: 208 MG/DL (ref 70–110)
POCT GLUCOSE: 233 MG/DL (ref 70–110)
POCT GLUCOSE: 267 MG/DL (ref 70–110)
POCT GLUCOSE: 329 MG/DL (ref 70–110)
POIKILOCYTOSIS BLD QL SMEAR: SLIGHT
POLYCHROMASIA BLD QL SMEAR: ABNORMAL
POTASSIUM SERPL-SCNC: 3.7 MMOL/L
PROT SERPL-MCNC: 6.6 G/DL
RBC # BLD AUTO: 3.85 M/UL
SODIUM SERPL-SCNC: 135 MMOL/L
VANCOMYCIN SERPL-MCNC: 19.4 UG/ML
WBC # BLD AUTO: 12.03 K/UL

## 2018-09-11 PROCEDURE — 83735 ASSAY OF MAGNESIUM: CPT

## 2018-09-11 PROCEDURE — 25000003 PHARM REV CODE 250: Performed by: STUDENT IN AN ORGANIZED HEALTH CARE EDUCATION/TRAINING PROGRAM

## 2018-09-11 PROCEDURE — 36569 INSJ PICC 5 YR+ W/O IMAGING: CPT

## 2018-09-11 PROCEDURE — 02HV33Z INSERTION OF INFUSION DEVICE INTO SUPERIOR VENA CAVA, PERCUTANEOUS APPROACH: ICD-10-PCS | Performed by: STUDENT IN AN ORGANIZED HEALTH CARE EDUCATION/TRAINING PROGRAM

## 2018-09-11 PROCEDURE — 80053 COMPREHEN METABOLIC PANEL: CPT

## 2018-09-11 PROCEDURE — 63600175 PHARM REV CODE 636 W HCPCS: Performed by: STUDENT IN AN ORGANIZED HEALTH CARE EDUCATION/TRAINING PROGRAM

## 2018-09-11 PROCEDURE — 25000003 PHARM REV CODE 250: Performed by: PHYSICIAN ASSISTANT

## 2018-09-11 PROCEDURE — C1751 CATH, INF, PER/CENT/MIDLINE: HCPCS

## 2018-09-11 PROCEDURE — 84100 ASSAY OF PHOSPHORUS: CPT

## 2018-09-11 PROCEDURE — 11000001 HC ACUTE MED/SURG PRIVATE ROOM

## 2018-09-11 PROCEDURE — 76937 US GUIDE VASCULAR ACCESS: CPT

## 2018-09-11 PROCEDURE — 63600175 PHARM REV CODE 636 W HCPCS: Performed by: PHYSICIAN ASSISTANT

## 2018-09-11 PROCEDURE — 80202 ASSAY OF VANCOMYCIN: CPT

## 2018-09-11 PROCEDURE — 36415 COLL VENOUS BLD VENIPUNCTURE: CPT

## 2018-09-11 PROCEDURE — 85027 COMPLETE CBC AUTOMATED: CPT

## 2018-09-11 PROCEDURE — 85007 BL SMEAR W/DIFF WBC COUNT: CPT

## 2018-09-11 RX ORDER — GUAIFENESIN 600 MG/1
600 TABLET, EXTENDED RELEASE ORAL 2 TIMES DAILY
Status: DISCONTINUED | OUTPATIENT
Start: 2018-09-11 | End: 2018-09-13 | Stop reason: HOSPADM

## 2018-09-11 RX ORDER — CETIRIZINE HYDROCHLORIDE 5 MG/1
10 TABLET ORAL DAILY
Status: DISCONTINUED | OUTPATIENT
Start: 2018-09-11 | End: 2018-09-13 | Stop reason: HOSPADM

## 2018-09-11 RX ORDER — LISINOPRIL 5 MG/1
5 TABLET ORAL DAILY
Status: DISCONTINUED | OUTPATIENT
Start: 2018-09-11 | End: 2018-09-13 | Stop reason: HOSPADM

## 2018-09-11 RX ORDER — CYCLOBENZAPRINE HCL 10 MG
10 TABLET ORAL 3 TIMES DAILY PRN
Status: DISCONTINUED | OUTPATIENT
Start: 2018-09-11 | End: 2018-09-11

## 2018-09-11 RX ORDER — CYCLOBENZAPRINE HCL 5 MG
5 TABLET ORAL 3 TIMES DAILY PRN
Status: DISCONTINUED | OUTPATIENT
Start: 2018-09-11 | End: 2018-09-13 | Stop reason: HOSPADM

## 2018-09-11 RX ORDER — BENZONATATE 100 MG/1
100 CAPSULE ORAL 3 TIMES DAILY PRN
Status: DISCONTINUED | OUTPATIENT
Start: 2018-09-11 | End: 2018-09-13 | Stop reason: HOSPADM

## 2018-09-11 RX ORDER — INSULIN ASPART 100 [IU]/ML
20 INJECTION, SOLUTION INTRAVENOUS; SUBCUTANEOUS
Status: DISCONTINUED | OUTPATIENT
Start: 2018-09-11 | End: 2018-09-12

## 2018-09-11 RX ADMIN — VANCOMYCIN 1000 MG: 1 INJECTION, SOLUTION INTRAVENOUS at 03:09

## 2018-09-11 RX ADMIN — INSULIN DETEMIR 35 UNITS: 100 INJECTION, SOLUTION SUBCUTANEOUS at 08:09

## 2018-09-11 RX ADMIN — DIPHENHYDRAMINE HYDROCHLORIDE 25 MG: 25 CAPSULE ORAL at 04:09

## 2018-09-11 RX ADMIN — DIPHENHYDRAMINE HYDROCHLORIDE 25 MG: 25 CAPSULE ORAL at 08:09

## 2018-09-11 RX ADMIN — GABAPENTIN 300 MG: 300 CAPSULE ORAL at 05:09

## 2018-09-11 RX ADMIN — INSULIN ASPART 20 UNITS: 100 INJECTION, SOLUTION INTRAVENOUS; SUBCUTANEOUS at 05:09

## 2018-09-11 RX ADMIN — INSULIN ASPART 6 UNITS: 100 INJECTION, SOLUTION INTRAVENOUS; SUBCUTANEOUS at 08:09

## 2018-09-11 RX ADMIN — INSULIN ASPART 2 UNITS: 100 INJECTION, SOLUTION INTRAVENOUS; SUBCUTANEOUS at 09:09

## 2018-09-11 RX ADMIN — ENOXAPARIN SODIUM 40 MG: 100 INJECTION SUBCUTANEOUS at 04:09

## 2018-09-11 RX ADMIN — INSULIN ASPART 4 UNITS: 100 INJECTION, SOLUTION INTRAVENOUS; SUBCUTANEOUS at 05:09

## 2018-09-11 RX ADMIN — GUAIFENESIN 600 MG: 600 TABLET, EXTENDED RELEASE ORAL at 10:09

## 2018-09-11 RX ADMIN — VANCOMYCIN 1000 MG: 1 INJECTION, SOLUTION INTRAVENOUS at 12:09

## 2018-09-11 RX ADMIN — HYDROCODONE BITARTRATE AND ACETAMINOPHEN 1 TABLET: 7.5; 325 TABLET ORAL at 08:09

## 2018-09-11 RX ADMIN — AMPICILLIN AND SULBACTAM 3 G: 2; 1 INJECTION, POWDER, FOR SOLUTION INTRAVENOUS at 05:09

## 2018-09-11 RX ADMIN — INSULIN DETEMIR 45 UNITS: 100 INJECTION, SOLUTION SUBCUTANEOUS at 09:09

## 2018-09-11 RX ADMIN — BENZONATATE 100 MG: 100 CAPSULE ORAL at 09:09

## 2018-09-11 RX ADMIN — DULOXETINE HYDROCHLORIDE 20 MG: 20 CAPSULE, DELAYED RELEASE ORAL at 08:09

## 2018-09-11 RX ADMIN — INSULIN ASPART 20 UNITS: 100 INJECTION, SOLUTION INTRAVENOUS; SUBCUTANEOUS at 12:09

## 2018-09-11 RX ADMIN — SENNOSIDES AND DOCUSATE SODIUM 2 TABLET: 8.6; 5 TABLET ORAL at 08:09

## 2018-09-11 RX ADMIN — GUAIFENESIN 600 MG: 600 TABLET, EXTENDED RELEASE ORAL at 09:09

## 2018-09-11 RX ADMIN — INSULIN ASPART 15 UNITS: 100 INJECTION, SOLUTION INTRAVENOUS; SUBCUTANEOUS at 08:09

## 2018-09-11 RX ADMIN — HYDROCODONE BITARTRATE AND ACETAMINOPHEN 1 TABLET: 7.5; 325 TABLET ORAL at 01:09

## 2018-09-11 RX ADMIN — LISINOPRIL 5 MG: 5 TABLET ORAL at 12:09

## 2018-09-11 RX ADMIN — AMPICILLIN AND SULBACTAM 3 G: 2; 1 INJECTION, POWDER, FOR SOLUTION INTRAVENOUS at 10:09

## 2018-09-11 RX ADMIN — GABAPENTIN 300 MG: 300 CAPSULE ORAL at 08:09

## 2018-09-11 RX ADMIN — GABAPENTIN 300 MG: 300 CAPSULE ORAL at 09:09

## 2018-09-11 RX ADMIN — CETIRIZINE HYDROCHLORIDE 10 MG: 5 TABLET, FILM COATED ORAL at 12:09

## 2018-09-11 RX ADMIN — FLUTICASONE PROPIONATE 50 MCG: 50 SPRAY, METERED NASAL at 08:09

## 2018-09-11 RX ADMIN — VANCOMYCIN 1000 MG: 1 INJECTION, SOLUTION INTRAVENOUS at 09:09

## 2018-09-11 RX ADMIN — HYDROCODONE BITARTRATE AND ACETAMINOPHEN 1 TABLET: 7.5; 325 TABLET ORAL at 04:09

## 2018-09-11 RX ADMIN — INSULIN DETEMIR 10 UNITS: 100 INJECTION, SOLUTION SUBCUTANEOUS at 12:09

## 2018-09-11 RX ADMIN — HYDROCODONE BITARTRATE AND ACETAMINOPHEN 1 TABLET: 7.5; 325 TABLET ORAL at 09:09

## 2018-09-11 NOTE — PROGRESS NOTES
[facility] Hospital Medicine  Progress Note    Patient Name: Mariah Foley  MRN: [id number]  Patient Class: IP- Inpatient   Admission Date: 2018  Length of Stay: 9 days  Attending Physician: Joe Helton MD  Primary Care Provider: Lukas Stein MD    [facility] Medicine Team: [facility] MED 5 Homer Mcgrath MD    Subjective:     Principal Problem:Sepsis    HPI:  46 yo F  with pmhx of fibromyalgia and DM2, in her usual state of health until 5 days prior to admission when she developed a sore throat with productive cough, which resolved after 2 days. Patient then developed myalgias, mainly around her left scapula and left CVA which she describes as a muscle stretching pain, 7 of 10 intensity, constant, associated with subjective fever, chills, generalized body aches, headache, sharp right ear paip, nausea, vomiting (once daily for past 4 days in the morning, nonbloody). No alleviating or aggravating factors. Patient tried ibuprofen for the pain at home which did not help. No chest or abdomian pain, diarrhea, constipation, changes in urinary habits, dysuria, hematuria, vaginal discharge or odors. Not sexually active. Has a ulcer on plantar aspect of right great toe which she says has been there for 2 months and has not noticed any pain, warmth, or discharge from it, although on examination there was obvious purulent discharge once dressing was removed. 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 Denies any recent trauma, sick contacts, travel out of the country.     Hospital Course:  9/3- Patient seen today and she is vitally stable except of low BP 97/54. Waiting for the MRI result of the R great toe. Continue on Vanc and Zosyn and f/u with Vanc trough tomorrow AM.  - NAEON, patient seen today and she is vitally stable. MRI of R great toe planter ulcer showed no evidence of osteomyelitis or drainable fluid collection. Wound culture showed gram negative  rods lactose . Urine culture showed staph aureus. Blood culture showed gram negative cocci. Repeat blood culture and continue on broad spectrum Abx (Vanc and Zosyn). Podiatry is following and debridment was performed today. Patient has productive cough, headache and congestion. Cetirizine and Fluticasone nasal spray was ordered. Insulin dose adjusted (Detemir 10U BID and Aspart 5U TID WM).  9/5 - no acute events. Awaiting further speciation  9/6- NAEON, patient seen today and she is vitally stable. Continue on Vanc and Zosyn and f/u with Vanc trough tomorrow AM. Adjust insulin regimen (detemir 15U BID, aspart 8U TID and continue on low dose ISS), f/u Podiatry recs.  9/7- NAEON, patient seen today and she is vitally stable. Continue on Vanc and Zosyn and f/u with Vanc trough. Adjust insulin regimen to (detemir 20U BID and aspart 10U TID). F/u Podiatry recs.  9/8- NAEON, patient seen today and she is vitally stable. CT maxillofacial and 2d echo was ordered to rule out bacteremia complications. Zosyn was discontinued. Continue on Vanc and Unasyn.  9/9- NAEON, patient is vitally stable. CT maxillofacial showed no drainable periodontal fluid collection. Waiting for 2d echo. Patient has itching, Benadryl was ordered prn, Hydrocodone dose was decreased. Midline placement was ordered.  9/10- NAEON, patient is stable. Patient being prepared for discharge. 2D echo showed no vegetations. Per ID's recommendation patient will be discharged with vanc and unasyn. Consult for PICC line placed as any duration of vanc requires it and unaysn is given continuously over a 24 hour period. PICC team consulted, told they will place line tomorrow. In addition, patient on 35 units BID of basal insulin, additional 5 units added for better glucose control.    9/11: Started mucinex and tessalon for patient who has complaints of cough today. Continued her home flexeril for back pain. Her glucose is still not well controlled as it is 329  this morning. Increased to 45 units BID of detemir and 20 units aspart. PICC line has been placed today. Patient also started on lisinopril for HTN. Will monitor one more day prior to discharge.     Interval History:  No acute events overnight. Patient address concern with her dental work after discharge. Explained plan of care to patient. She also reports back pain and troublesome cough that is leading to headache.     Review of Systems   Constitutional: Positive for fatigue. Negative for chills and fever.   HENT: Positive for dental problem (painful jaw).    Respiratory: Positive for cough. Negative for chest tightness and shortness of breath.    Cardiovascular: Negative for chest pain, palpitations and leg swelling.   Gastrointestinal: Negative for abdominal pain, constipation, diarrhea and nausea.   Musculoskeletal: Positive for back pain. Negative for arthralgias and myalgias.   Skin: Negative for color change, pallor and rash.   Neurological: Negative for dizziness, weakness and headaches.     Objective:     Vital Signs (Most Recent):  Temp: 97.8 °F (36.6 °C) (09/11/18 0400)  Pulse: 86 (09/11/18 0400)  Resp: 16 (09/11/18 0400)  BP: (!) 148/82 (09/11/18 0400)  SpO2: 99 % (09/10/18 1900) Vital Signs (24h Range):  Temp:  [97.6 °F (36.4 °C)-98 °F (36.7 °C)] 97.8 °F (36.6 °C)  Pulse:  [84-95] 86  Resp:  [16-18] 16  SpO2:  [93 %-99 %] 99 %  BP: (148-188)/(70-89) 148/82     Weight: 62.6 kg (138 lb)  Body mass index is 24.45 kg/m².    Intake/Output Summary (Last 24 hours) at 9/11/2018 0639  Last data filed at 9/10/2018 1800  Gross per 24 hour   Intake 1694.01 ml   Output 300 ml   Net 1394.01 ml      Physical Exam   Constitutional: She is oriented to person, place, and time. She appears well-developed and well-nourished. No distress.   HENT:   Head: Normocephalic and atraumatic.   Dental carries present   Cardiovascular: Normal rate, regular rhythm, normal heart sounds and intact distal pulses.   Pulmonary/Chest:  Effort normal and breath sounds normal.   Cough present, mild congestion     Abdominal: Soft. Bowel sounds are normal.   Musculoskeletal: Normal range of motion. She exhibits no edema or deformity.   Neurological: She is alert and oriented to person, place, and time.   Skin: Skin is warm and dry.   Psychiatric: She has a normal mood and affect. Her behavior is normal.   Vitals reviewed.      Significant Labs:   CBC:   Recent Labs   Lab  09/10/18   0530  09/11/18   0451   WBC  11.32  12.03   HGB  10.3*  10.5*   HCT  32.5*  32.3*   PLT  255  291     CMP:   Recent Labs   Lab  09/10/18   0530  09/11/18   0451   NA  135*  135*   K  4.4  3.7   CL  102  99   CO2  24  26   GLU  326*  353*   BUN  14  12   CREATININE  0.9  0.8   CALCIUM  9.2  9.3   PROT  6.4  6.6   ALBUMIN  2.8*  2.9*   BILITOT  0.2  0.2   ALKPHOS  184*  188*   AST  64*  48*   ALT  98*  89*   ANIONGAP  9  10   EGFRNONAA  >60.0  >60.0     Magnesium:   Recent Labs   Lab  09/10/18   0530  09/11/18   0451   MG  1.8  1.9     POCT Glucose:   Recent Labs   Lab  09/10/18   1657  09/10/18   2144  09/11/18   0342   POCTGLUCOSE  145*  256*  329*     Phosphorus: 4.0   Vancomycin random: 19.4    Significant Imaging: No new imaging    Assessment/Plan:      * Sepsis    Bacteremia  Diabetic foot culture  Urinary tract infection  - presented with 3/4 SIRS. Rapid strep, influenza, HIV, and monospot testing negative.   - etiologies multifactorial including MRSA in urine culture (9/2), gram negative dionicio in blood culture 9/2/18, and klebsiella from right food ulcer 9/2/18  - CT maxillofacial showed no drainable periodontal fluid collection.  - 2d echo showed no cardiac vegetations  - continue on Vanc and started on Unasyn on 9/7, currently Day 3  - f/u infectious diseases recommendations, still waiting on sensitivities of veillonella   - appreciate further Podiatry recs        Hypertension    - lisinopril 5 mg daily          Liver injury    - AST and ALT is trending up since date  of admission.  - Liver US scan showed fatty live with no focal hepatic lesion  - Hep C was negative  - Duloxetine and Cetirizine are discontinued.        Debility    - PT OT re consulted for discharge recommendations        Fibromyalgia      -resume gabapentin 300 TID            DM2 (diabetes mellitus, type 2)    -takes insulin at home, patient think it's 10 units at night. Also on glimepiride BID   -currently she is on detemir 45U BID and scheduled aspart 20U TID with ISS  -continue POCT and BGL; adjust regimen as needed        Tobacco abuse    - for tobacco cessation           UTI (urinary tract infection)    - rocephin given in ED for possible UTI   - urine culture showed MRSA, no symptoms or signs of UTI.  - continue on broad spectrum Abx         Leukocytosis      -see plan for sepsis         Diabetic ulcer of toe of right foot    - podiatry consulted  - MRI of R foot showed no evidence of osteomyelitis or drainable fluid collection.  - debridement was performed by podiatry (on 9/4) and wound has dressed properly.  - wound culture showed gram negative rods lactose  (Klebsiella), Pravotella bivia and Veillonella parvula.  - Continue on Vanc and discontinue Zosyn. Started on Unasyn on 9/7.  - f/u Podiatry recs.  - f/u ID recs.          VTE Risk Mitigation (From admission, onward)        Ordered     enoxaparin injection 40 mg  Daily      09/03/18 0722     IP VTE LOW RISK PATIENT  Once      09/02/18 2155     Place sequential compression device  Until discontinued      09/02/18 2155     IP VTE LOW RISK PATIENT  Once      09/02/18 2155              Homer Mcgrath MD  Department of Hospital Medicine   [facility]

## 2018-09-11 NOTE — SUBJECTIVE & OBJECTIVE
Interval History:  No acute events overnight. Patient address concern with her dental work after discharge. Explained plan of care to patient. She also reports back pain and troublesome cough that is leading to headache.     Review of Systems   Constitutional: Positive for fatigue. Negative for chills and fever.   HENT: Positive for dental problem (painful jaw).    Respiratory: Positive for cough. Negative for chest tightness and shortness of breath.    Cardiovascular: Negative for chest pain, palpitations and leg swelling.   Gastrointestinal: Negative for abdominal pain, constipation, diarrhea and nausea.   Musculoskeletal: Positive for back pain. Negative for arthralgias and myalgias.   Skin: Negative for color change, pallor and rash.   Neurological: Negative for dizziness, weakness and headaches.     Objective:     Vital Signs (Most Recent):  Temp: 97.8 °F (36.6 °C) (09/11/18 0400)  Pulse: 86 (09/11/18 0400)  Resp: 16 (09/11/18 0400)  BP: (!) 148/82 (09/11/18 0400)  SpO2: 99 % (09/10/18 1900) Vital Signs (24h Range):  Temp:  [97.6 °F (36.4 °C)-98 °F (36.7 °C)] 97.8 °F (36.6 °C)  Pulse:  [84-95] 86  Resp:  [16-18] 16  SpO2:  [93 %-99 %] 99 %  BP: (148-188)/(70-89) 148/82     Weight: 62.6 kg (138 lb)  Body mass index is 24.45 kg/m².    Intake/Output Summary (Last 24 hours) at 9/11/2018 0639  Last data filed at 9/10/2018 1800  Gross per 24 hour   Intake 1694.01 ml   Output 300 ml   Net 1394.01 ml      Physical Exam   Constitutional: She is oriented to person, place, and time. She appears well-developed and well-nourished. No distress.   HENT:   Head: Normocephalic and atraumatic.   Dental carries present   Cardiovascular: Normal rate, regular rhythm, normal heart sounds and intact distal pulses.   Pulmonary/Chest: Effort normal and breath sounds normal.   Cough present, mild congestion     Abdominal: Soft. Bowel sounds are normal.   Musculoskeletal: Normal range of motion. She exhibits no edema or deformity.    Neurological: She is alert and oriented to person, place, and time.   Skin: Skin is warm and dry.   Psychiatric: She has a normal mood and affect. Her behavior is normal.   Vitals reviewed.      Significant Labs:   CBC:   Recent Labs   Lab  09/10/18   0530  09/11/18   0451   WBC  11.32  12.03   HGB  10.3*  10.5*   HCT  32.5*  32.3*   PLT  255  291     CMP:   Recent Labs   Lab  09/10/18   0530  09/11/18   0451   NA  135*  135*   K  4.4  3.7   CL  102  99   CO2  24  26   GLU  326*  353*   BUN  14  12   CREATININE  0.9  0.8   CALCIUM  9.2  9.3   PROT  6.4  6.6   ALBUMIN  2.8*  2.9*   BILITOT  0.2  0.2   ALKPHOS  184*  188*   AST  64*  48*   ALT  98*  89*   ANIONGAP  9  10   EGFRNONAA  >60.0  >60.0     Magnesium:   Recent Labs   Lab  09/10/18   0530  09/11/18   0451   MG  1.8  1.9     POCT Glucose:   Recent Labs   Lab  09/10/18   1657  09/10/18   2144  09/11/18   0342   POCTGLUCOSE  145*  256*  329*     Phosphorus: 4.0   Vancomycin random: 19.4    Significant Imaging: No new imaging

## 2018-09-11 NOTE — PLAN OF CARE
Referral send to [facility] for home IV abx through [id number].  Víctor has contacted Elaina Arreola,clinical service liaison for [facility] cell# [phone], for arrangement of home iv infusion.    4:00pm - Per [facility] is not in network w/payor source.    Nathan Ralph LMSW  Ext. [phone]

## 2018-09-11 NOTE — ASSESSMENT & PLAN NOTE
-takes insulin at home, patient think it's 10 units at night. Also on glimepiride BID   -currently she is on detemir 45U BID and scheduled aspart 20U TID with ISS  -continue POCT and BGL; adjust regimen as needed

## 2018-09-11 NOTE — PLAN OF CARE
Referral send to [facility] through [facility] system.  : Toñito Muñoz, clinical Liaison, cell# [phone]    Nathan Ralph LMSW  Ext. [phone]'

## 2018-09-11 NOTE — CONSULTS
Double lumen PICC placed to (R) BASILIC vein.  34  cm in length, 0cm exposed, and 25 cm arm circumference.  Lot # [id number]

## 2018-09-11 NOTE — NURSING
Pt awake, alert resp even and unlabored. Skin warm and dry to touch. Dry cough noted.  Pt independent with ambulation, gait steady. Safety measures in place.

## 2018-09-11 NOTE — PROCEDURES
"Mariah Foley is a 47 y.o. female patient.    Temp: 96.5 °F (35.8 °C) (09/11/18 0755)  Pulse: 81 (09/11/18 0755)  Resp: 16 (09/11/18 0755)  BP: (!) 140/80 (09/11/18 0755)  SpO2: 98 % (09/11/18 0755)  Weight: 62.6 kg (138 lb) (09/02/18 2134)  Height: 5' 3" (160 cm) (09/02/18 2134)    PICC  Date/Time: 9/11/2018 9:59 AM  Performed by: Memo Marie RN  Consent Done: Yes  Time out: Immediately prior to procedure a time out was called to verify the correct patient, procedure, equipment, support staff and site/side marked as required  Indications: med administration and vascular access  Anesthesia: local infiltration  Local anesthetic: lidocaine 1% without epinephrine  Anesthetic Total (mL): 2  Preparation: skin prepped with ChloraPrep  Skin prep agent dried: skin prep agent completely dried prior to procedure  Sterile barriers: all five maximum sterile barriers used - cap, mask, sterile gown, sterile gloves, and large sterile sheet  Hand hygiene: hand hygiene performed prior to central venous catheter insertion  Location details: right basilic  Catheter type: double lumen  Catheter size: 5 Fr  Catheter Length: 34cm    Ultrasound guidance: yes  Vessel Caliber: medium and patent, compressibility normal  Vascular Doppler: not done  Needle advanced into vessel with real time Ultrasound guidance.  Guidewire confirmed in vessel.  Image recorded and saved.  Sterile sheath used.  no esophageal manometryNumber of attempts: 1  Post-procedure: blood return through all ports, chlorhexidine patch and sterile dressing applied  Specimens: No  Implants: No  Assessment: placement verified by x-ray  Complications: none          Luanne Funes  9/11/2018  "

## 2018-09-12 ENCOUNTER — TELEPHONE (OUTPATIENT)
Dept: PHARMACY | Facility: CLINIC | Age: 48
End: 2018-09-12

## 2018-09-12 LAB
ALBUMIN SERPL BCP-MCNC: 3.1 G/DL
ALP SERPL-CCNC: 179 U/L
ALT SERPL W/O P-5'-P-CCNC: 83 U/L
ANION GAP SERPL CALC-SCNC: 9 MMOL/L
ANISOCYTOSIS BLD QL SMEAR: SLIGHT
AST SERPL-CCNC: 40 U/L
BASOPHILS # BLD AUTO: ABNORMAL K/UL
BASOPHILS NFR BLD: 0 %
BILIRUB SERPL-MCNC: 0.3 MG/DL
BUN SERPL-MCNC: 13 MG/DL
CALCIUM SERPL-MCNC: 9.4 MG/DL
CHLORIDE SERPL-SCNC: 101 MMOL/L
CO2 SERPL-SCNC: 27 MMOL/L
CREAT SERPL-MCNC: 0.8 MG/DL
DIFFERENTIAL METHOD: ABNORMAL
EOSINOPHIL # BLD AUTO: ABNORMAL K/UL
EOSINOPHIL NFR BLD: 0 %
ERYTHROCYTE [DISTWIDTH] IN BLOOD BY AUTOMATED COUNT: 13.4 %
EST. GFR  (AFRICAN AMERICAN): >60 ML/MIN/1.73 M^2
EST. GFR  (NON AFRICAN AMERICAN): >60 ML/MIN/1.73 M^2
GLUCOSE SERPL-MCNC: 314 MG/DL
HCT VFR BLD AUTO: 35.6 %
HGB BLD-MCNC: 11.1 G/DL
HYPOCHROMIA BLD QL SMEAR: ABNORMAL
IMM GRANULOCYTES # BLD AUTO: ABNORMAL K/UL
IMM GRANULOCYTES NFR BLD AUTO: ABNORMAL %
LYMPHOCYTES # BLD AUTO: ABNORMAL K/UL
LYMPHOCYTES NFR BLD: 17 %
MAGNESIUM SERPL-MCNC: 2 MG/DL
MCH RBC QN AUTO: 26.8 PG
MCHC RBC AUTO-ENTMCNC: 31.2 G/DL
MCV RBC AUTO: 86 FL
METAMYELOCYTES NFR BLD MANUAL: 1 %
MONOCYTES # BLD AUTO: ABNORMAL K/UL
MONOCYTES NFR BLD: 6 %
MYELOCYTES NFR BLD MANUAL: 4 %
NEUTROPHILS NFR BLD: 72 %
NRBC BLD-RTO: 0 /100 WBC
OVALOCYTES BLD QL SMEAR: ABNORMAL
PHOSPHATE SERPL-MCNC: 4 MG/DL
PLATELET # BLD AUTO: 319 K/UL
PLATELET BLD QL SMEAR: ABNORMAL
PMV BLD AUTO: 10.8 FL
POCT GLUCOSE: 108 MG/DL (ref 70–110)
POCT GLUCOSE: 133 MG/DL (ref 70–110)
POCT GLUCOSE: 322 MG/DL (ref 70–110)
POIKILOCYTOSIS BLD QL SMEAR: SLIGHT
POLYCHROMASIA BLD QL SMEAR: ABNORMAL
POTASSIUM SERPL-SCNC: 3.6 MMOL/L
PROT SERPL-MCNC: 7.1 G/DL
RBC # BLD AUTO: 4.14 M/UL
SODIUM SERPL-SCNC: 137 MMOL/L
WBC # BLD AUTO: 12.86 K/UL

## 2018-09-12 PROCEDURE — 85007 BL SMEAR W/DIFF WBC COUNT: CPT

## 2018-09-12 PROCEDURE — 25000003 PHARM REV CODE 250: Performed by: STUDENT IN AN ORGANIZED HEALTH CARE EDUCATION/TRAINING PROGRAM

## 2018-09-12 PROCEDURE — 63600175 PHARM REV CODE 636 W HCPCS: Performed by: PHYSICIAN ASSISTANT

## 2018-09-12 PROCEDURE — 0JBQ0ZZ EXCISION OF RIGHT FOOT SUBCUTANEOUS TISSUE AND FASCIA, OPEN APPROACH: ICD-10-PCS | Performed by: PODIATRIST

## 2018-09-12 PROCEDURE — 11000001 HC ACUTE MED/SURG PRIVATE ROOM

## 2018-09-12 PROCEDURE — 63600175 PHARM REV CODE 636 W HCPCS: Performed by: STUDENT IN AN ORGANIZED HEALTH CARE EDUCATION/TRAINING PROGRAM

## 2018-09-12 PROCEDURE — 36415 COLL VENOUS BLD VENIPUNCTURE: CPT

## 2018-09-12 PROCEDURE — 80053 COMPREHEN METABOLIC PANEL: CPT

## 2018-09-12 PROCEDURE — 85027 COMPLETE CBC AUTOMATED: CPT

## 2018-09-12 PROCEDURE — 25000003 PHARM REV CODE 250: Performed by: PHYSICIAN ASSISTANT

## 2018-09-12 PROCEDURE — 84100 ASSAY OF PHOSPHORUS: CPT

## 2018-09-12 PROCEDURE — 83735 ASSAY OF MAGNESIUM: CPT

## 2018-09-12 PROCEDURE — 99232 SBSQ HOSP IP/OBS MODERATE 35: CPT | Mod: ,,, | Performed by: STUDENT IN AN ORGANIZED HEALTH CARE EDUCATION/TRAINING PROGRAM

## 2018-09-12 RX ORDER — LISINOPRIL 5 MG/1
5 TABLET ORAL DAILY
Qty: 90 TABLET | Refills: 3 | Status: SHIPPED | OUTPATIENT
Start: 2018-09-13 | End: 2019-09-13

## 2018-09-12 RX ORDER — VANCOMYCIN HCL IN 5 % DEXTROSE 1G/250ML
1 PLASTIC BAG, INJECTION (ML) INTRAVENOUS 3 TIMES DAILY
Start: 2018-09-12

## 2018-09-12 RX ORDER — INSULIN GLARGINE 100 [IU]/ML
55 INJECTION, SOLUTION SUBCUTANEOUS 2 TIMES DAILY
Qty: 15 ML | Refills: 3 | Status: SHIPPED | OUTPATIENT
Start: 2018-09-12 | End: 2019-09-12

## 2018-09-12 RX ORDER — INSULIN LISPRO 100 [IU]/ML
23 INJECTION, SOLUTION INTRAVENOUS; SUBCUTANEOUS
Qty: 62.1 ML | Refills: 3 | Status: SHIPPED | OUTPATIENT
Start: 2018-09-12 | End: 2019-09-12

## 2018-09-12 RX ORDER — INSULIN LISPRO 100 [IU]/ML
23 INJECTION, SOLUTION INTRAVENOUS; SUBCUTANEOUS
Qty: 15 ML | Refills: 3 | Status: SHIPPED | OUTPATIENT
Start: 2018-09-12 | End: 2019-09-12

## 2018-09-12 RX ORDER — HYDROCODONE BITARTRATE AND ACETAMINOPHEN 7.5; 325 MG/1; MG/1
1 TABLET ORAL EVERY 6 HOURS PRN
Qty: 30 TABLET | Refills: 0
Start: 2018-09-12 | End: 2018-09-13

## 2018-09-12 RX ORDER — GABAPENTIN 300 MG/1
300 CAPSULE ORAL 3 TIMES DAILY
Qty: 270 CAPSULE | Refills: 3 | Status: SHIPPED | OUTPATIENT
Start: 2018-09-12 | End: 2019-09-12

## 2018-09-12 RX ORDER — INSULIN ASPART 100 [IU]/ML
23 INJECTION, SOLUTION INTRAVENOUS; SUBCUTANEOUS
Status: DISCONTINUED | OUTPATIENT
Start: 2018-09-12 | End: 2018-09-13 | Stop reason: HOSPADM

## 2018-09-12 RX ORDER — INSULIN LISPRO 100 [IU]/ML
23 INJECTION, SOLUTION INTRAVENOUS; SUBCUTANEOUS 3 TIMES DAILY
Qty: 60 ML | Refills: 3 | Status: SHIPPED | OUTPATIENT
Start: 2018-09-12 | End: 2018-09-12 | Stop reason: HOSPADM

## 2018-09-12 RX ORDER — PEN NEEDLE, DIABETIC 30 GX3/16"
NEEDLE, DISPOSABLE MISCELLANEOUS
Qty: 200 EACH | Refills: 3 | Status: SHIPPED | OUTPATIENT
Start: 2018-09-12

## 2018-09-12 RX ADMIN — DULOXETINE HYDROCHLORIDE 20 MG: 20 CAPSULE, DELAYED RELEASE ORAL at 10:09

## 2018-09-12 RX ADMIN — INSULIN ASPART 8 UNITS: 100 INJECTION, SOLUTION INTRAVENOUS; SUBCUTANEOUS at 10:09

## 2018-09-12 RX ADMIN — CETIRIZINE HYDROCHLORIDE 10 MG: 5 TABLET, FILM COATED ORAL at 10:09

## 2018-09-12 RX ADMIN — GABAPENTIN 300 MG: 300 CAPSULE ORAL at 10:09

## 2018-09-12 RX ADMIN — BENZONATATE 100 MG: 100 CAPSULE ORAL at 03:09

## 2018-09-12 RX ADMIN — AMPICILLIN AND SULBACTAM 3 G: 2; 1 INJECTION, POWDER, FOR SOLUTION INTRAVENOUS at 05:09

## 2018-09-12 RX ADMIN — AMPICILLIN AND SULBACTAM 3 G: 2; 1 INJECTION, POWDER, FOR SOLUTION INTRAVENOUS at 06:09

## 2018-09-12 RX ADMIN — AMPICILLIN AND SULBACTAM 3 G: 2; 1 INJECTION, POWDER, FOR SOLUTION INTRAVENOUS at 11:09

## 2018-09-12 RX ADMIN — SENNOSIDES AND DOCUSATE SODIUM 2 TABLET: 8.6; 5 TABLET ORAL at 10:09

## 2018-09-12 RX ADMIN — GABAPENTIN 300 MG: 300 CAPSULE ORAL at 02:09

## 2018-09-12 RX ADMIN — HYDROCODONE BITARTRATE AND ACETAMINOPHEN 1 TABLET: 7.5; 325 TABLET ORAL at 06:09

## 2018-09-12 RX ADMIN — INSULIN ASPART 23 UNITS: 100 INJECTION, SOLUTION INTRAVENOUS; SUBCUTANEOUS at 06:09

## 2018-09-12 RX ADMIN — HYDROCODONE BITARTRATE AND ACETAMINOPHEN 1 TABLET: 7.5; 325 TABLET ORAL at 10:09

## 2018-09-12 RX ADMIN — INSULIN ASPART 23 UNITS: 100 INJECTION, SOLUTION INTRAVENOUS; SUBCUTANEOUS at 10:09

## 2018-09-12 RX ADMIN — DIPHENHYDRAMINE HYDROCHLORIDE 25 MG: 25 CAPSULE ORAL at 10:09

## 2018-09-12 RX ADMIN — LISINOPRIL 5 MG: 5 TABLET ORAL at 10:09

## 2018-09-12 RX ADMIN — GUAIFENESIN 600 MG: 600 TABLET, EXTENDED RELEASE ORAL at 10:09

## 2018-09-12 RX ADMIN — HYDROCODONE BITARTRATE AND ACETAMINOPHEN 1 TABLET: 7.5; 325 TABLET ORAL at 05:09

## 2018-09-12 RX ADMIN — DIPHENHYDRAMINE HYDROCHLORIDE 25 MG: 25 CAPSULE ORAL at 06:09

## 2018-09-12 RX ADMIN — ENOXAPARIN SODIUM 40 MG: 100 INJECTION SUBCUTANEOUS at 06:09

## 2018-09-12 RX ADMIN — VANCOMYCIN 1000 MG: 1 INJECTION, SOLUTION INTRAVENOUS at 03:09

## 2018-09-12 RX ADMIN — VANCOMYCIN 1000 MG: 1 INJECTION, SOLUTION INTRAVENOUS at 02:09

## 2018-09-12 RX ADMIN — VANCOMYCIN 1000 MG: 1 INJECTION, SOLUTION INTRAVENOUS at 11:09

## 2018-09-12 RX ADMIN — AMPICILLIN AND SULBACTAM 3 G: 2; 1 INJECTION, POWDER, FOR SOLUTION INTRAVENOUS at 10:09

## 2018-09-12 RX ADMIN — GABAPENTIN 300 MG: 300 CAPSULE ORAL at 11:09

## 2018-09-12 RX ADMIN — CYCLOBENZAPRINE HYDROCHLORIDE 5 MG: 5 TABLET, FILM COATED ORAL at 03:09

## 2018-09-12 RX ADMIN — GUAIFENESIN 600 MG: 600 TABLET, EXTENDED RELEASE ORAL at 11:09

## 2018-09-12 NOTE — PROGRESS NOTES
DOCUMENTATION ONLY:  Prior Authorization for Mrs. Foley's Amelog flexpen approved from 9/12/18 to 9/12/19    Case Id: [id number]  Co-pay: $0.00    Kiana Trotter  Pharmacy Technician   [facility]  Phone: [phone]  Fax: [phone]

## 2018-09-12 NOTE — PROGRESS NOTES
[facility] Hospital Medicine  Progress Note    Patient Name: Mariah Foley  MRN: [id number]  Patient Class: IP- Inpatient   Admission Date: 2018  Length of Stay: 10 days  Attending Physician: Joe Helton MD  Primary Care Provider: Lukas Stein MD    [facility] Medicine Team: [facility] 5 Homer Mcgrath MD    Subjective:     Principal Problem:Sepsis    HPI:  46 yo F  with pmhx of fibromyalgia and DM2, in her usual state of health until 5 days prior to admission when she developed a sore throat with productive cough, which resolved after 2 days. Patient then developed myalgias, mainly around her left scapula and left CVA which she describes as a muscle stretching pain, 7 of 10 intensity, constant, associated with subjective fever, chills, generalized body aches, headache, sharp right ear paip, nausea, vomiting (once daily for past 4 days in the morning, nonbloody). No alleviating or aggravating factors. Patient tried ibuprofen for the pain at home which did not help. No chest or abdomian pain, diarrhea, constipation, changes in urinary habits, dysuria, hematuria, vaginal discharge or odors. Not sexually active. Has a ulcer on plantar aspect of right great toe which she says has been there for 2 months and has not noticed any pain, warmth, or discharge from it, although on examination there was obvious purulent discharge once dressing was removed. 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 Denies any recent trauma, sick contacts, travel out of the country.     Hospital Course:  9/3- Patient seen today and she is vitally stable except of low BP 97/54. Waiting for the MRI result of the R great toe. Continue on Vanc and Zosyn and f/u with Vanc trough tomorrow AM.  - NAEON, patient seen today and she is vitally stable. MRI of R great toe planter ulcer showed no evidence of osteomyelitis or drainable fluid collection. Wound culture showed gram negative  rods lactose . Urine culture showed staph aureus. Blood culture showed gram negative cocci. Repeat blood culture and continue on broad spectrum Abx (Vanc and Zosyn). Podiatry is following and debridment was performed today. Patient has productive cough, headache and congestion. Cetirizine and Fluticasone nasal spray was ordered. Insulin dose adjusted (Detemir 10U BID and Aspart 5U TID WM).  9/5 - no acute events. Awaiting further speciation  9/6- NAEON, patient seen today and she is vitally stable. Continue on Vanc and Zosyn and f/u with Vanc trough tomorrow AM. Adjust insulin regimen (detemir 15U BID, aspart 8U TID and continue on low dose ISS), f/u Podiatry recs.  9/7- NAEON, patient seen today and she is vitally stable. Continue on Vanc and Zosyn and f/u with Vanc trough. Adjust insulin regimen to (detemir 20U BID and aspart 10U TID). F/u Podiatry recs.  9/8- NAEON, patient seen today and she is vitally stable. CT maxillofacial and 2d echo was ordered to rule out bacteremia complications. Zosyn was discontinued. Continue on Vanc and Unasyn.  9/9- NAEON, patient is vitally stable. CT maxillofacial showed no drainable periodontal fluid collection. Waiting for 2d echo. Patient has itching, Benadryl was ordered prn, Hydrocodone dose was decreased. Midline placement was ordered.  9/10- NAEON, patient is stable. Patient being prepared for discharge. 2D echo showed no vegetations. Per ID's recommendation patient will be discharged with vanc and unasyn. Consult for PICC line placed as any duration of vanc requires it and unaysn is given continuously over a 24 hour period. PICC team consulted, told they will place line tomorrow. In addition, patient on 35 units BID of basal insulin, additional 5 units added for better glucose control.    9/11: Started mucinex and tessalon for patient who has complaints of cough today. Continued her home flexeril for back pain. Her glucose is still not well controlled as it is 329  this morning. Increased to 45 units BID of detemir and 20 units aspart. PICC line has been placed today. Patient also started on lisinopril for HTN. Will monitor one more day prior to discharge.   9/12: Mucinex and tessalon helped with cough. She has not requested her flexeril. Glucose not well controlled, increased detemir to 55 BID and aspart to 23 TID. Will get diabetic education prior to discharge. Patient still hypertensive, lisinopril started yesterday. Wound debrided by podiatry today. Aim for discharge tomorrow.     Interval History:   No acute events overnight. Patient reports doing well this morning. She is not asking for her flexeril, will schedule.     Review of Systems   Constitutional: Negative for chills, fatigue and fever.   Respiratory: Negative for cough, chest tightness and shortness of breath.    Cardiovascular: Negative for chest pain, palpitations and leg swelling.   Gastrointestinal: Negative for abdominal pain, constipation, diarrhea and nausea.   Musculoskeletal: Positive for back pain. Negative for arthralgias and myalgias.   Skin: Negative for color change, pallor and rash.   Neurological: Negative for dizziness, weakness and headaches.     Objective:     Vital Signs (Most Recent):  Temp: (!) 95.6 °F (35.3 °C) (09/12/18 0521)  Pulse: 83 (09/12/18 0521)  Resp: 19 (09/12/18 0521)  BP: (!) 140/78 (09/12/18 0521)  SpO2: 97 % (09/12/18 0521) Vital Signs (24h Range):  Temp:  [95.6 °F (35.3 °C)-97.9 °F (36.6 °C)] 95.6 °F (35.3 °C)  Pulse:  [81-95] 83  Resp:  [16-20] 19  SpO2:  [96 %-98 %] 97 %  BP: (140-166)/(74-87) 140/78     Weight: 62.6 kg (138 lb)  Body mass index is 24.45 kg/m².    Intake/Output Summary (Last 24 hours) at 9/12/2018 0643  Last data filed at 9/11/2018 1830  Gross per 24 hour   Intake 1720 ml   Output 800 ml   Net 920 ml      Physical Exam   Constitutional: She is oriented to person, place, and time. She appears well-developed and well-nourished.   HENT:   Head: Normocephalic  and atraumatic.   Poor dentition   Cardiovascular: Normal rate, regular rhythm, normal heart sounds and intact distal pulses.   Pulmonary/Chest: Effort normal and breath sounds normal.   Abdominal: Soft. Bowel sounds are normal.   Musculoskeletal: Normal range of motion. She exhibits tenderness (back pain). She exhibits no edema or deformity.   Neurological: She is alert and oriented to person, place, and time.   Skin: Skin is warm and dry.   Psychiatric: She has a normal mood and affect. Her behavior is normal.   Vitals reviewed.      Significant Labs:   CBC:   Recent Labs   Lab  09/11/18   0451  09/12/18   0556   WBC  12.03  12.86*   HGB  10.5*  11.1*   HCT  32.3*  35.6*   PLT  291  319     CMP:   Recent Labs   Lab  09/11/18 0451 09/12/18   0556   NA  135*  137   K  3.7  3.6   CL  99  101   CO2  26  27   GLU  353*  314*   BUN  12  13   CREATININE  0.8  0.8   CALCIUM  9.3  9.4   PROT  6.6  7.1   ALBUMIN  2.9*  3.1*   BILITOT  0.2  0.3   ALKPHOS  188*  179*   AST  48*  40   ALT  89*  83*   ANIONGAP  10  9   EGFRNONAA  >60.0  >60.0     Magnesium:   Recent Labs   Lab  09/11/18 0451 09/12/18   0556   MG  1.9  2.0     Phosphorus: 4.0    Significant Imaging: I have reviewed all pertinent imaging results/findings within the past 24 hours.    Assessment/Plan:      * Sepsis    Bacteremia  Diabetic foot culture  Urinary tract infection  - presented with 3/4 SIRS. Rapid strep, influenza, HIV, and monospot testing negative.   - etiologies multifactorial including MRSA in urine culture (9/2), gram negative dionicio in blood culture 9/2/18, and klebsiella from right food ulcer 9/2/18  - CT maxillofacial showed no drainable periodontal fluid collection.  - 2d echo showed no cardiac vegetations  - continue on Vanc and started on Unasyn on 9/7, currently Day 6  - f/u infectious diseases recommendations, still waiting on sensitivities of veillonella   - appreciate further Podiatry recs        Hypertension    - lisinopril 5 mg  daily          Liver injury    - AST and ALT is trending up since date of admission.  - Liver US scan showed fatty live with no focal hepatic lesion  - Hep C was negative  - Duloxetine and Cetirizine are discontinued.        Debility    - PT OT re consulted for discharge recommendations        Fibromyalgia      -resume gabapentin 300 TID            DM2 (diabetes mellitus, type 2)    -takes insulin at home, patient think it's 10 units at night. Also on glimepiride BID   -currently she is on detemir 55U BID and scheduled aspart 23U TID with ISS  -continue POCT and BGL; adjust regimen as needed  - Diabetic education  - endocrine follow up        Tobacco abuse    - for tobacco cessation           UTI (urinary tract infection)    - rocephin given in ED for possible UTI   - urine culture showed MRSA, no symptoms or signs of UTI.  - continue on broad spectrum Abx         Leukocytosis      -see plan for sepsis         Diabetic ulcer of toe of right foot    - podiatry consulted  - MRI of R foot showed no evidence of osteomyelitis or drainable fluid collection.  - debridement was performed by podiatry (on 9/4) and wound has dressed properly.  - wound culture showed gram negative rods lactose  (Klebsiella), Pravotella bivia and Veillonella parvula.  - Continue on Vanc and discontinue Zosyn. Started on Unasyn on 9/7.  - f/u Podiatry recs.  - f/u ID recs.          VTE Risk Mitigation (From admission, onward)        Ordered     enoxaparin injection 40 mg  Daily      09/03/18 0722     IP VTE LOW RISK PATIENT  Once      09/02/18 2155     Place sequential compression device  Until discontinued      09/02/18 2155     IP VTE LOW RISK PATIENT  Once      09/02/18 2155              Homer Mcgrath MD  Department of Hospital Medicine   [facility]

## 2018-09-12 NOTE — PROGRESS NOTES
[facility]  Podiatry  Progress Note    Patient Name: Mariah Foley  MRN: [id number]  Admission Date: 2018  Hospital Length of Stay: 10 days  Attending Physician: Joe Helton MD  Primary Care Provider: Lukas Stein MD   Interval Hx:  Patient Seen at bedside for dressing change and bedside debridement.  Patient denies F/C/N/V.  Dressings clean, dry, and intact.      Scheduled Meds:   ampicillin-sulbactim (UNASYN) IVPB  3 g Intravenous Q6H    cetirizine  10 mg Oral Daily    DULoxetine  20 mg Oral Daily    enoxaparin  40 mg Subcutaneous Daily    fluticasone  1 spray Each Nare Daily    gabapentin  300 mg Oral TID    guaiFENesin  600 mg Oral BID    insulin aspart U-100  23 Units Subcutaneous TIDWM    insulin detemir U-100  55 Units Subcutaneous BID    lisinopril  5 mg Oral Daily    miconazole  100 mg Vaginal QHS    nicotine  1 patch Transdermal Daily    polyethylene glycol  17 g Oral BID    senna-docusate 8.6-50 mg  2 tablet Oral BID    vancomycin (VANCOCIN) IVPB  15 mg/kg Intravenous Q8H     Continuous Infusions:  PRN Meds:acetaminophen, benzonatate, cyclobenzaprine, dextrose 50%, dextrose 50%, diphenhydrAMINE, glucagon (human recombinant), glucose, glucose, HYDROcodone-acetaminophen, insulin aspart U-100, ondansetron, sodium chloride 0.9%    Review of patient's allergies indicates:   Allergen Reactions    Bactrim [sulfamethoxazole-trimethoprim]         Past Medical History:   Diagnosis Date    Diabetes mellitus      Past Surgical History:   Procedure Laterality Date     SECTION, CLASSIC      TUBAL LIGATION         Family History     None        Tobacco Use    Smoking status: Current Every Day Smoker     Packs/day: 0.50     Years: 7.00     Pack years: 3.50     Types: Cigarettes    Smokeless tobacco: Never Used   Substance and Sexual Activity    Alcohol use: No    Drug use: No    Sexual activity: Not Currently     Review of Systems   Constitutional: Negative  for chills and fever.   Cardiovascular: Negative for leg swelling.   Gastrointestinal: Negative for nausea and vomiting.   Skin: Positive for wound.     Objective:     Vital Signs (Most Recent):  Temp: 96.7 °F (35.9 °C) (09/12/18 1454)  Pulse: 85 (09/12/18 1454)  Resp: 20 (09/12/18 1454)  BP: 136/74 (09/12/18 1454)  SpO2: 97 % (09/12/18 1454) Vital Signs (24h Range):  Temp:  [95.6 °F (35.3 °C)-97.9 °F (36.6 °C)] 96.7 °F (35.9 °C)  Pulse:  [83-86] 85  Resp:  [16-20] 20  SpO2:  [95 %-97 %] 97 %  BP: (136-166)/(74-87) 136/74     Weight: 62.6 kg (138 lb)  Body mass index is 24.45 kg/m².    Foot Exam    General  General Appearance: appears stated age and healthy   Orientation: alert and oriented to person, place, and time       Right Foot/Ankle     Inspection and Palpation  Skin Exam: drainage and ulcer; skin not intact and no erythema     Neurovascular  Dorsalis pedis: 2+  Posterior tibial: 2+  Saphenous nerve sensation: normal  Tibial nerve sensation: normal  Superficial peroneal nerve sensation: normal  Deep peroneal nerve sensation: normal  Sural nerve sensation: normal    Comments  Planter wound to the R great toe.  - no edema, erythema, drainage or SOI to the wound.  - Wound base is fibrogranular and skin margins are hyperkeratotic.  - Bone probes deep but does not appear to probe to bone.  - No tracking noted.    Left Foot/Ankle      Inspection and Palpation  Skin Exam: skin intact;     Neurovascular  Dorsalis pedis: 2+  Posterior tibial: 2+  Saphenous nerve sensation: normal  Tibial nerve sensation: normal  Superficial peroneal nerve sensation: normal  Deep peroneal nerve sensation: normal  Sural nerve sensation: normal            Laboratory:  BMP:   Recent Labs   Lab  09/12/18   0556   GLU  314*   NA  137   K  3.6   CL  101   CO2  27   BUN  13   CREATININE  0.8   CALCIUM  9.4   MG  2.0     CBC:   Recent Labs   Lab  09/12/18   0556   WBC  12.86*   RBC  4.14   HGB  11.1*   HCT  35.6*   PLT  319   MCV  86   MCH   26.8*   [facility]  [facility]*     Wound Cultures:   Recent Labs   Lab  09/02/18 2344   LABAERO  KLEBSIELLA PNEUMONIAE  Many       Microbiology Results (last 7 days)     Procedure Component Value Units Date/Time    Blood culture [[id number]] Collected:  09/04/18 0807    Order Status:  Completed Specimen:  Blood Updated:  09/09/18 1012     Blood Culture, Routine No growth after 5 days.    Narrative:       Aerobic and anaerobic from site #1    Blood culture [[id number]] Collected:  09/04/18 0807    Order Status:  Completed Specimen:  Blood Updated:  09/09/18 1012     Blood Culture, Routine No growth after 5 days.    Narrative:       Aerobic and anaerobic from site #2    Culture, Anaerobic [[id number]] Collected:  09/02/18 2344    Order Status:  Completed Specimen:  Wound Updated:  09/07/18 1339     Anaerobic Culture --     VEILLONELLA PARVULA  Moderate       Anaerobic Culture --     PREVOTELLA (B.) BIVIA  Moderate      Narrative:       add on per Dr Nguyen order #[id number] 09/03/2018  00:59     Blood culture #1 **CANNOT BE ORDERED STAT** [[id number]] Collected:  09/02/18 1921    Order Status:  Completed Specimen:  Blood from Peripheral, Antecubital, Left Updated:  09/06/18 1208     Blood Culture, Routine Gram stain oscar bottle: Gram negative cocci     Blood Culture, Routine Results called to and read back by: Kalie Bradley RN  09/04/2018  07:03     Blood Culture, Routine VEILLONELLA PARVULA    Blood culture #2 **CANNOT BE ORDERED STAT** [[id number]] Collected:  09/02/18 1921    Order Status:  Completed Specimen:  Blood from Peripheral, Antecubital, Left Updated:  09/06/18 1208     Blood Culture, Routine Gram stain oscar bottle: Gram Negative Cocci     Blood Culture, Routine Results called to and read back by: Kalie Bradley RN  09/04/2018  07:02     Blood Culture, Routine VEILLONELLA PARVULA          Diagnostic Results:  MRI: I have reviewed all pertinent results/findings within the past 24 hours.  No signs of acute osteomyelitis or  abscess    Clinical Findings:  Wound on right hallux toe measures 0.5 x 0.5 x 0.1  With granular base and hyperkeratotic margins.  Negative purulence, erythema, edema, or malodor                        Assessment/Plan:     * Sepsis    Per primary        DM2 (diabetes mellitus, type 2)    Per primary        Diabetic ulcer of toe of right foot    Mariah Foley is a 47 y.o. female with Right plantar 1st toe wound is stable. No erythema, edema, drainage, SOI.  -MRI reviewed: No signs of acute osteomyelitis or abscess  -With verbal consent from the patient, the wound was debrided today.  -place patient in football dressing.     -Patient to WBAT in a darco shoe.  -Podiatry will follow.    Procedure note:   Procedure: Debridement of wound    Surgeon: Dr. Magdaleno  Assisting  Surgeon: Dr. Dariusz Carlin, DPM, PGY2  Diagnosis: Ulcer    Findings: With verbal consent Wound was debrided with 15 blade.  Callus was excisionally debrided to the level of subcutaneous.  Healthy bleeding granular tissue was noted.  Post debridement measurements were 0.5 x 0.5 x 0.1cm    Estimated blood loss: less than 5ml              Dariusz Carlin MD  Podiatry  [facility]

## 2018-09-12 NOTE — SUBJECTIVE & OBJECTIVE
Interval Hx:  Patient Seen at bedside for dressing change and bedside debridement.  Patient denies F/C/N/V.  Dressings clean, dry, and intact.      Scheduled Meds:   ampicillin-sulbactim (UNASYN) IVPB  3 g Intravenous Q6H    cetirizine  10 mg Oral Daily    DULoxetine  20 mg Oral Daily    enoxaparin  40 mg Subcutaneous Daily    fluticasone  1 spray Each Nare Daily    gabapentin  300 mg Oral TID    guaiFENesin  600 mg Oral BID    insulin aspart U-100  23 Units Subcutaneous TIDWM    insulin detemir U-100  55 Units Subcutaneous BID    lisinopril  5 mg Oral Daily    miconazole  100 mg Vaginal QHS    nicotine  1 patch Transdermal Daily    polyethylene glycol  17 g Oral BID    senna-docusate 8.6-50 mg  2 tablet Oral BID    vancomycin (VANCOCIN) IVPB  15 mg/kg Intravenous Q8H     Continuous Infusions:  PRN Meds:acetaminophen, benzonatate, cyclobenzaprine, dextrose 50%, dextrose 50%, diphenhydrAMINE, glucagon (human recombinant), glucose, glucose, HYDROcodone-acetaminophen, insulin aspart U-100, ondansetron, sodium chloride 0.9%    Review of patient's allergies indicates:   Allergen Reactions    Bactrim [sulfamethoxazole-trimethoprim]         Past Medical History:   Diagnosis Date    Diabetes mellitus      Past Surgical History:   Procedure Laterality Date     SECTION, CLASSIC      TUBAL LIGATION         Family History     None        Tobacco Use    Smoking status: Current Every Day Smoker     Packs/day: 0.50     Years: 7.00     Pack years: 3.50     Types: Cigarettes    Smokeless tobacco: Never Used   Substance and Sexual Activity    Alcohol use: No    Drug use: No    Sexual activity: Not Currently     Review of Systems   Constitutional: Negative for chills and fever.   Cardiovascular: Negative for leg swelling.   Gastrointestinal: Negative for nausea and vomiting.   Skin: Positive for wound.     Objective:     Vital Signs (Most Recent):  Temp: 96.7 °F (35.9 °C) (18 1454)  Pulse: 85  (09/12/18 1454)  Resp: 20 (09/12/18 1454)  BP: 136/74 (09/12/18 1454)  SpO2: 97 % (09/12/18 1454) Vital Signs (24h Range):  Temp:  [95.6 °F (35.3 °C)-97.9 °F (36.6 °C)] 96.7 °F (35.9 °C)  Pulse:  [83-86] 85  Resp:  [16-20] 20  SpO2:  [95 %-97 %] 97 %  BP: (136-166)/(74-87) 136/74     Weight: 62.6 kg (138 lb)  Body mass index is 24.45 kg/m².    Foot Exam    General  General Appearance: appears stated age and healthy   Orientation: alert and oriented to person, place, and time       Right Foot/Ankle     Inspection and Palpation  Skin Exam: drainage and ulcer; skin not intact and no erythema     Neurovascular  Dorsalis pedis: 2+  Posterior tibial: 2+  Saphenous nerve sensation: normal  Tibial nerve sensation: normal  Superficial peroneal nerve sensation: normal  Deep peroneal nerve sensation: normal  Sural nerve sensation: normal    Comments  Planter wound to the R great toe.  - no edema, erythema, drainage or SOI to the wound.  - Wound base is fibrogranular and skin margins are hyperkeratotic.  - Bone probes deep but does not appear to probe to bone.  - No tracking noted.    Left Foot/Ankle      Inspection and Palpation  Skin Exam: skin intact;     Neurovascular  Dorsalis pedis: 2+  Posterior tibial: 2+  Saphenous nerve sensation: normal  Tibial nerve sensation: normal  Superficial peroneal nerve sensation: normal  Deep peroneal nerve sensation: normal  Sural nerve sensation: normal            Laboratory:  BMP:   Recent Labs   Lab  09/12/18   0556   GLU  314*   NA  137   K  3.6   CL  101   CO2  27   BUN  13   CREATININE  0.8   CALCIUM  9.4   MG  2.0     CBC:   Recent Labs   Lab  09/12/18   0556   WBC  12.86*   RBC  4.14   HGB  11.1*   HCT  35.6*   PLT  319   MCV  86   MCH  26.8*   MCHC  31.2*     Wound Cultures:   Recent Labs   Lab  09/02/18   2344   LABAERO  KLEBSIELLA PNEUMONIAE  Many       Microbiology Results (last 7 days)     Procedure Component Value Units Date/Time    Blood culture [[id number]] Collected:   09/04/18 0807    Order Status:  Completed Specimen:  Blood Updated:  09/09/18 1012     Blood Culture, Routine No growth after 5 days.    Narrative:       Aerobic and anaerobic from site #1    Blood culture [[id number]] Collected:  09/04/18 0807    Order Status:  Completed Specimen:  Blood Updated:  09/09/18 1012     Blood Culture, Routine No growth after 5 days.    Narrative:       Aerobic and anaerobic from site #2    Culture, Anaerobic [[id number]] Collected:  09/02/18 2344    Order Status:  Completed Specimen:  Wound Updated:  09/07/18 1339     Anaerobic Culture --     VEILLONELLA PARVULA  Moderate       Anaerobic Culture --     PREVOTELLA (B.) BIVIA  Moderate      Narrative:       add on per Dr Nguyen order #[id number] 09/03/2018  00:59     Blood culture #1 **CANNOT BE ORDERED STAT** [[id number]] Collected:  09/02/18 1921    Order Status:  Completed Specimen:  Blood from Peripheral, Antecubital, Left Updated:  09/06/18 1208     Blood Culture, Routine Gram stain oscar bottle: Gram negative cocci     Blood Culture, Routine Results called to and read back by: Kalie Bradley RN  09/04/2018  07:03     Blood Culture, Routine VEILLONELLA PARVULA    Blood culture #2 **CANNOT BE ORDERED STAT** [[id number]] Collected:  09/02/18 1921    Order Status:  Completed Specimen:  Blood from Peripheral, Antecubital, Left Updated:  09/06/18 1208     Blood Culture, Routine Gram stain oscar bottle: Gram Negative Cocci     Blood Culture, Routine Results called to and read back by: Kalie Bradley RN  09/04/2018  07:02     Blood Culture, Routine VEILLONELLA PARVULA          Diagnostic Results:  MRI: I have reviewed all pertinent results/findings within the past 24 hours.  No signs of acute osteomyelitis or abscess    Clinical Findings:  Wound on right hallux toe measures 0.5 x 0.5 x 0.1  With granular base and hyperkeratotic margins.  Negative purulence, erythema, edema, or malodor

## 2018-09-12 NOTE — ASSESSMENT & PLAN NOTE
-takes insulin at home, patient think it's 10 units at night. Also on glimepiride BID   -currently she is on detemir 55U BID and scheduled aspart 23U TID with ISS  -continue POCT and BGL; adjust regimen as needed  - Diabetic education  - endocrine follow up

## 2018-09-12 NOTE — PLAN OF CARE
Referral for home health has been send to [facility] agency for medical home needs through [facility].  Pt has been accepted.    Nathan Ralph LMSW  Covering SW

## 2018-09-12 NOTE — PLAN OF CARE
[facility]    HOME HEALTH ORDERS  FACE TO FACE ENCOUNTER    Patient Name: Mariah Foley  YOB: 1970    PCP: Lukas Stein MD   PCP Address: [address] [place] [address] / [place] [address]  PCP Phone Number: [phone]  PCP Fax: [phone]    Encounter Date: 09/12/2018    Admit to Home Health    Diagnoses:  Active Hospital Problems    Diagnosis  POA    *Sepsis [A41.9]  Yes     Priority: 1 - High    Diabetic ulcer of toe of right foot [E11.621, L97.519]  Yes     Priority: 2     Leukocytosis [D72.829]  Yes     Priority: 2     UTI (urinary tract infection) [N39.0]  Yes     Priority: 2     Tobacco abuse [Z72.0]  Yes     Priority: 5     DM2 (diabetes mellitus, type 2) [E11.9]  Yes     Priority: 5     Fibromyalgia [M79.7]  Yes     Priority: 6     Debility [R53.81]  Yes     Priority: 10     Hypertension [I10]  Yes    Liver injury [S36.119A]  Yes      Resolved Hospital Problems    Diagnosis Date Resolved POA    Bacteremia [R78.81] 09/10/2018 Yes     Priority: 2     Cough [R05] 09/10/2018 Yes     Priority: 2     Foot ulcer due to secondary DM [E13.621, L97.509] 09/04/2018 Yes    Infected skin ulcer with fat layer exposed [L98.492, L08.9] 09/04/2018 Yes       Future Appointments   Date Time Provider Department Center   9/19/2018  3:00 PM GORDON Ace, ANP [facility] [address]     Follow-up Information     Schedule an appointment as soon as possible for a visit with \[facility] [employer].    Specialty:  Dental General Practice  Contact information:  [address] [facility] [address]  [phone]             Lukas Stein MD.    Specialty:  Family Medicine  Why:  As needed, If symptoms worsen  Contact information:  [address] [place] [address]  [place] [address]  [phone]                     I have seen and examined this patient face to face today. My clinical findings that support the need for the home health skilled services and home bound status are the  following:  Weakness/numbness causing balance and gait disturbance due to Infection and Weakness/Debility making it taxing to leave home.    Allergies:  Review of patient's allergies indicates:   Allergen Reactions    Bactrim [sulfamethoxazole-trimethoprim]        Diet: diabetic diet: 2000 calorie    Activities: activity as tolerated    Nursing:   SN to complete comprehensive assessment including routine vital signs. Instruct on disease process and s/s of complications to report to MD. Review/verify medication list sent home with the patient at time of discharge  and instruct patient/caregiver as needed. Frequency may be adjusted depending on start of care date.    Notify MD if SBP > 160 or < 90; DBP > 90 or < 50; HR > 120 or < 50; Temp > 101      Labs: Weekly ESR, CRP, CBC, CMP and Vanc troughs on Mondays faxed to ID dept at [phone]    CONSULTS:    Physical Therapy to evaluate and treat. Evaluate for home safety and equipment needs; Establish/upgrade home exercise program. Perform / instruct on therapeutic exercises, gait training, transfer training, and Range of Motion.  Occupational Therapy to evaluate and treat. Evaluate home environment for safety and equipment needs. Perform/Instruct on transfers, ADL training, ROM, and therapeutic exercises.   to evaluate for community resources/long-range planning.    MISCELLANEOUS CARE:  Home Infusion Therapy:   SN to perform Infusion Therapy/Central Line Care.  Review Central Line Care & Central Line Flush with patient.    Administer (drug and dose): Vancomycin 1000mg in 250 ml every 8 hours    Last dose given: 9/13/18 11:45                     Home dose due: 9/13/18 19:45   End date: 9/18/18    Scrub the Hub: Prior to accessing the line, always perform a 30 second alcohol scrub  Each lumen of the central line is to be flushed at least daily with 10 mL Normal Saline and 3 mL Heparin flush (10 units/mL)  Skilled Nurse (SN) may draw blood from IV  access  Blood Draw Procedure:   - Aspirate at least 5 mL of blood   - Discard   - Obtain specimen   - Change injection cap   - Flush with 20 mL Normal Saline followed by a                 3-5 mL Heparin flush (10 units/mL)  Central :   - Sterile dressing changes are done weekly and as needed.   - Use chlor-hexadine scrub to cleanse site, apply Biopatch to insertion site,       apply securement device dressing   - Injection caps are changed weekly and after EVERY lab draw.   - If sterile gauze is under dressing to control oozing,                 dressing change must be performed every 24 hours until gauze is not needed.    Administer (drug and dose): Unasyn 3 g in sodium chloride 0.9% 100ml IVPB every 6 hours  Last dose given: 9/13/18 1030                    Home dose due: 9/13/18 16:30   End date: 9/18/18    Scrub the Hub: Prior to accessing the line, always perform a 30 second alcohol scrub  Each lumen of the central line is to be flushed at least daily with 10 mL Normal Saline and 3 mL Heparin flush (10 units/mL)  Skilled Nurse (SN) may draw blood from IV access  Blood Draw Procedure:   - Aspirate at least 5 mL of blood   - Discard   - Obtain specimen   - Change injection cap   - Flush with 20 mL Normal Saline followed by a                 3-5 mL Heparin flush (10 units/mL)  Central :   - Sterile dressing changes are done weekly and as needed.   - Use chlor-hexadine scrub to cleanse site, apply Biopatch to insertion site,       apply securement device dressing   - Injection caps are changed weekly and after EVERY lab draw.   - If sterile gauze is under dressing to control oozing,                 dressing change must be performed every 24 hours until gauze is not needed.    Diabetic Care: SN to perform and educate Diabetic management with blood glucose monitoring:, Fingerstick blood sugar AC and HS and Report CBG < 60 or > 350 to physician    WOUND CARE ORDERS  n/a      Medications:  "Review discharge medications with patient and family and provide education.      Current Discharge Medication List      START taking these medications    Details   ampicillin sodium/sulbactam Na (AMPICILLIN/SULBACTAM 3 G/100 ML NS, READY TO MIX,) Inject 100 mLs (3 g total) into the vein every 6 (six) hours. for 6 days  Qty: 2400 mL, Refills: 0      gabapentin (NEURONTIN) 300 MG capsule Take 1 capsule (300 mg total) by mouth 3 (three) times daily.  Qty: 270 capsule, Refills: 3      HYDROcodone-acetaminophen (NORCO) 7.5-325 mg per tablet Take 1 tablet by mouth every 6 (six) hours as needed.  Qty: 30 tablet, Refills: 0      insulin lispro (HUMALOG KWIKPEN) 100 unit/mL InPn pen Inject 23 Units into the skin 3 (three) times daily with meals.  Qty: 15 mL, Refills: 3      insulin lispro (HUMALOG) 100 unit/mL injection Inject 23 Units into the skin 3 (three) times daily before meals.  Qty: 62.1 mL, Refills: 3      lisinopril (PRINIVIL,ZESTRIL) 5 MG tablet Take 1 tablet (5 mg total) by mouth once daily.  Qty: 90 tablet, Refills: 3      pen needle, diabetic 32 gauge x 5/32" Ndle For use with Basaglar and Ademalog pens  Qty: 200 each, Refills: 3      vancomycin HCl in 5 % dextrose (VANCOMYCIN IN DEXTROSE 5 %) 1 gram/250 mL Soln Inject 250 mLs (1,000 mg total) into the vein 3 (three) times daily. Inject 1 gram into vein three times daily for 6 days         CONTINUE these medications which have CHANGED    Details   insulin glargine (LANTUS SOLOSTAR) 100 unit/mL (3 mL) InPn pen Inject 55 Units into the skin 2 (two) times daily.  Qty: 1 Box, Refills: 3         CONTINUE these medications which have NOT CHANGED    Details   calcium carbonate (TUMS) 200 mg calcium (500 mg) chewable tablet Take 1 tablet by mouth daily as needed for Heartburn.      cyclobenzaprine (FLEXERIL) 10 MG tablet Take 10 mg by mouth 3 (three) times daily.      ergocalciferol (VITAMIN D2) 50,000 unit Cap Take 50,000 Units by mouth every 7 days.      escitalopram " oxalate (LEXAPRO) 10 MG tablet Take 10 mg by mouth once daily.      mupirocin (BACTROBAN) 2 % ointment Apply topically daily as needed      vancomycin HCl (VANCOMYCIN, BULK,) 900 mcg/mg (not less than) Powd 1 application by Misc.(Non-Drug; Combo Route) route daily as needed (apply powder to right foot as needed).          STOP taking these medications       gabapentin (NEURONTIN) 800 MG tablet Comments:   Reason for Stopping:         oxyCODONE-acetaminophen (PERCOCET) 5-325 mg per tablet Comments:   Reason for Stopping:               I certify that this patient is confined to her home and needs intermittent skilled nursing care, physical therapy and occupational therapy.    Rachel Montesinos MD  Internal Medicine, PGY-3  [phone]

## 2018-09-12 NOTE — PT/OT/SLP PROGRESS
Physical Therapy   Screen    Mariah Foley   MRN: [id number]     Pt. was evaluated and discharged from PT on 9/5/2018 with no therapy needs upon hospital discharge. New PT orders received 9/11/2018. Per discussion with pt., she continues to have no goals/needs for acute PT services. Pt. currently mod. indep. with mobility. Will discontinue PT at this time.    Mustapha Dominguez, PT  9/12/2018

## 2018-09-12 NOTE — ASSESSMENT & PLAN NOTE
Mariah Foley is a 47 y.o. female with Right plantar 1st toe wound is stable. No erythema, edema, drainage, SOI.  -MRI reviewed: No signs of acute osteomyelitis or abscess  -With verbal consent from the patient, the wound was debrided today.  -place patient in football dressing.     -Patient to WBAT in a darco shoe.  -Podiatry will follow.    Procedure note:   Procedure: Debridement of wound    Surgeon: Dr. Magdaleno  Assisting  Surgeon: Dr. Dariusz Carlin, DPM, PGY2  Diagnosis: Ulcer    Findings: With verbal consent Wound was debrided with 15 blade.  Callus was excisionally debrided to the level of subcutaneous.  Healthy bleeding granular tissue was noted.  Post debridement measurements were 0.5 x 0.5 x 0.1cm    Estimated blood loss: less than 5ml

## 2018-09-12 NOTE — SUBJECTIVE & OBJECTIVE
Interval History:   No acute events overnight. Patient reports doing well this morning. She is not asking for her flexeril, will schedule.     Review of Systems   Constitutional: Negative for chills, fatigue and fever.   Respiratory: Negative for cough, chest tightness and shortness of breath.    Cardiovascular: Negative for chest pain, palpitations and leg swelling.   Gastrointestinal: Negative for abdominal pain, constipation, diarrhea and nausea.   Musculoskeletal: Positive for back pain. Negative for arthralgias and myalgias.   Skin: Negative for color change, pallor and rash.   Neurological: Negative for dizziness, weakness and headaches.     Objective:     Vital Signs (Most Recent):  Temp: (!) 95.6 °F (35.3 °C) (09/12/18 0521)  Pulse: 83 (09/12/18 0521)  Resp: 19 (09/12/18 0521)  BP: (!) 140/78 (09/12/18 0521)  SpO2: 97 % (09/12/18 0521) Vital Signs (24h Range):  Temp:  [95.6 °F (35.3 °C)-97.9 °F (36.6 °C)] 95.6 °F (35.3 °C)  Pulse:  [81-95] 83  Resp:  [16-20] 19  SpO2:  [96 %-98 %] 97 %  BP: (140-166)/(74-87) 140/78     Weight: 62.6 kg (138 lb)  Body mass index is 24.45 kg/m².    Intake/Output Summary (Last 24 hours) at 9/12/2018 0643  Last data filed at 9/11/2018 1830  Gross per 24 hour   Intake 1720 ml   Output 800 ml   Net 920 ml      Physical Exam   Constitutional: She is oriented to person, place, and time. She appears well-developed and well-nourished.   HENT:   Head: Normocephalic and atraumatic.   Poor dentition   Cardiovascular: Normal rate, regular rhythm, normal heart sounds and intact distal pulses.   Pulmonary/Chest: Effort normal and breath sounds normal.   Abdominal: Soft. Bowel sounds are normal.   Musculoskeletal: Normal range of motion. She exhibits tenderness (back pain). She exhibits no edema or deformity.   Neurological: She is alert and oriented to person, place, and time.   Skin: Skin is warm and dry.   Psychiatric: She has a normal mood and affect. Her behavior is normal.   Vitals  reviewed.      Significant Labs:   CBC:   Recent Labs   Lab  09/11/18   0451 09/12/18   0556   WBC  12.03  12.86*   HGB  10.5*  11.1*   HCT  32.3*  35.6*   PLT  291  319     CMP:   Recent Labs   Lab  09/11/18   0451 09/12/18   0556   NA  135*  137   K  3.7  3.6   CL  99  101   CO2  26  27   GLU  353*  314*   BUN  12  13   CREATININE  0.8  0.8   CALCIUM  9.3  9.4   PROT  6.6  7.1   ALBUMIN  2.9*  3.1*   BILITOT  0.2  0.3   ALKPHOS  188*  179*   AST  48*  40   ALT  89*  83*   ANIONGAP  10  9   EGFRNONAA  >60.0  >60.0     Magnesium:   Recent Labs   Lab  09/11/18   0451 09/12/18   0556   MG  1.9  2.0     Phosphorus: 4.0    Significant Imaging: I have reviewed all pertinent imaging results/findings within the past 24 hours.

## 2018-09-12 NOTE — ASSESSMENT & PLAN NOTE
Bacteremia  Diabetic foot culture  Urinary tract infection  - presented with 3/4 SIRS. Rapid strep, influenza, HIV, and monospot testing negative.   - etiologies multifactorial including MRSA in urine culture (9/2), gram negative dionicio in blood culture 9/2/18, and klebsiella from right food ulcer 9/2/18  - CT maxillofacial showed no drainable periodontal fluid collection.  - 2d echo showed no cardiac vegetations  - continue on Vanc and started on Unasyn on 9/7, currently Day 6  - f/u infectious diseases recommendations, still waiting on sensitivities of veillonella   - appreciate further Podiatry recs

## 2018-09-12 NOTE — PLAN OF CARE
Problem: Fall Risk (Adult)  Intervention: Patient Rounds  Pt have a clutter free environment,  Pt ambulate independently with a steady gait. Safety measures in place.

## 2018-09-12 NOTE — PT/OT/SLP PROGRESS
Occupational Therapy   Screen    New OT orders received for pt 9/11. Pt seen 9/4 for initial OT evaluation and was d/c 2/2 pt independence. Chart review revealed no change in medical status or new sx report. OT spoke with pt this AM who stated she was unsure why new therapy orders were placed. Pt stated R toe wound has healed and she is ambulating better than before. Pt states she is planning to d/c tomorrow with IV antibiotics. Pt state no need for OT services at this time. OT orders to be d/c 9/12.    Imani Bailey OT  9/12/2018

## 2018-09-13 VITALS
HEIGHT: 63 IN | DIASTOLIC BLOOD PRESSURE: 94 MMHG | HEART RATE: 97 BPM | RESPIRATION RATE: 18 BRPM | OXYGEN SATURATION: 100 % | BODY MASS INDEX: 24.45 KG/M2 | SYSTOLIC BLOOD PRESSURE: 165 MMHG | TEMPERATURE: 98 F | WEIGHT: 138 LBS

## 2018-09-13 LAB
ALBUMIN SERPL BCP-MCNC: 2.9 G/DL
ALP SERPL-CCNC: 176 U/L
ALT SERPL W/O P-5'-P-CCNC: 65 U/L
ANION GAP SERPL CALC-SCNC: 6 MMOL/L
AST SERPL-CCNC: 29 U/L
BASOPHILS # BLD AUTO: 0.07 K/UL
BASOPHILS NFR BLD: 0.6 %
BILIRUB SERPL-MCNC: 0.3 MG/DL
BUN SERPL-MCNC: 16 MG/DL
CALCIUM SERPL-MCNC: 9.4 MG/DL
CHLORIDE SERPL-SCNC: 102 MMOL/L
CO2 SERPL-SCNC: 29 MMOL/L
CREAT SERPL-MCNC: 0.7 MG/DL
DIFFERENTIAL METHOD: ABNORMAL
EOSINOPHIL # BLD AUTO: 0.3 K/UL
EOSINOPHIL NFR BLD: 2 %
ERYTHROCYTE [DISTWIDTH] IN BLOOD BY AUTOMATED COUNT: 13.5 %
EST. GFR  (AFRICAN AMERICAN): >60 ML/MIN/1.73 M^2
EST. GFR  (NON AFRICAN AMERICAN): >60 ML/MIN/1.73 M^2
GLUCOSE SERPL-MCNC: 213 MG/DL
HCT VFR BLD AUTO: 33.7 %
HGB BLD-MCNC: 10.7 G/DL
IMM GRANULOCYTES # BLD AUTO: 0.47 K/UL
IMM GRANULOCYTES NFR BLD AUTO: 3.8 %
LYMPHOCYTES # BLD AUTO: 2.9 K/UL
LYMPHOCYTES NFR BLD: 22.8 %
MAGNESIUM SERPL-MCNC: 1.8 MG/DL
MCH RBC QN AUTO: 27.3 PG
MCHC RBC AUTO-ENTMCNC: 31.8 G/DL
MCV RBC AUTO: 86 FL
MONOCYTES # BLD AUTO: 1.5 K/UL
MONOCYTES NFR BLD: 12.3 %
NEUTROPHILS # BLD AUTO: 7.3 K/UL
NEUTROPHILS NFR BLD: 58.5 %
NRBC BLD-RTO: 0 /100 WBC
PHOSPHATE SERPL-MCNC: 3.9 MG/DL
PLATELET # BLD AUTO: 293 K/UL
PMV BLD AUTO: 10.9 FL
POCT GLUCOSE: 352 MG/DL (ref 70–110)
POTASSIUM SERPL-SCNC: 4 MMOL/L
PROT SERPL-MCNC: 6.6 G/DL
RBC # BLD AUTO: 3.92 M/UL
SODIUM SERPL-SCNC: 137 MMOL/L
WBC # BLD AUTO: 12.5 K/UL

## 2018-09-13 PROCEDURE — 25000003 PHARM REV CODE 250: Performed by: STUDENT IN AN ORGANIZED HEALTH CARE EDUCATION/TRAINING PROGRAM

## 2018-09-13 PROCEDURE — 63600175 PHARM REV CODE 636 W HCPCS: Performed by: PHYSICIAN ASSISTANT

## 2018-09-13 PROCEDURE — 83735 ASSAY OF MAGNESIUM: CPT

## 2018-09-13 PROCEDURE — 25000003 PHARM REV CODE 250: Performed by: PHYSICIAN ASSISTANT

## 2018-09-13 PROCEDURE — 80053 COMPREHEN METABOLIC PANEL: CPT

## 2018-09-13 PROCEDURE — 99232 SBSQ HOSP IP/OBS MODERATE 35: CPT | Mod: ,,, | Performed by: STUDENT IN AN ORGANIZED HEALTH CARE EDUCATION/TRAINING PROGRAM

## 2018-09-13 PROCEDURE — 63600175 PHARM REV CODE 636 W HCPCS: Performed by: STUDENT IN AN ORGANIZED HEALTH CARE EDUCATION/TRAINING PROGRAM

## 2018-09-13 PROCEDURE — 85025 COMPLETE CBC W/AUTO DIFF WBC: CPT

## 2018-09-13 PROCEDURE — 84100 ASSAY OF PHOSPHORUS: CPT

## 2018-09-13 RX ORDER — HYDROCODONE BITARTRATE AND ACETAMINOPHEN 7.5; 325 MG/1; MG/1
1 TABLET ORAL EVERY 6 HOURS PRN
Qty: 30 TABLET | Refills: 0 | Status: SHIPPED | OUTPATIENT
Start: 2018-09-13 | End: 2018-09-13 | Stop reason: HOSPADM

## 2018-09-13 RX ADMIN — AMPICILLIN AND SULBACTAM 3 G: 2; 1 INJECTION, POWDER, FOR SOLUTION INTRAVENOUS at 11:09

## 2018-09-13 RX ADMIN — HYDROCODONE BITARTRATE AND ACETAMINOPHEN 1 TABLET: 7.5; 325 TABLET ORAL at 12:09

## 2018-09-13 RX ADMIN — CETIRIZINE HYDROCHLORIDE 10 MG: 5 TABLET, FILM COATED ORAL at 11:09

## 2018-09-13 RX ADMIN — INSULIN ASPART 23 UNITS: 100 INJECTION, SOLUTION INTRAVENOUS; SUBCUTANEOUS at 11:09

## 2018-09-13 RX ADMIN — DULOXETINE HYDROCHLORIDE 20 MG: 20 CAPSULE, DELAYED RELEASE ORAL at 11:09

## 2018-09-13 RX ADMIN — AMPICILLIN AND SULBACTAM 3 G: 2; 1 INJECTION, POWDER, FOR SOLUTION INTRAVENOUS at 04:09

## 2018-09-13 RX ADMIN — GUAIFENESIN 600 MG: 600 TABLET, EXTENDED RELEASE ORAL at 11:09

## 2018-09-13 RX ADMIN — HYDROCODONE BITARTRATE AND ACETAMINOPHEN 1 TABLET: 7.5; 325 TABLET ORAL at 02:09

## 2018-09-13 RX ADMIN — GABAPENTIN 300 MG: 300 CAPSULE ORAL at 02:09

## 2018-09-13 RX ADMIN — VANCOMYCIN 1000 MG: 1 INJECTION, SOLUTION INTRAVENOUS at 02:09

## 2018-09-13 RX ADMIN — INSULIN ASPART 10 UNITS: 100 INJECTION, SOLUTION INTRAVENOUS; SUBCUTANEOUS at 11:09

## 2018-09-13 RX ADMIN — CYCLOBENZAPRINE HYDROCHLORIDE 5 MG: 5 TABLET, FILM COATED ORAL at 12:09

## 2018-09-13 RX ADMIN — GABAPENTIN 300 MG: 300 CAPSULE ORAL at 11:09

## 2018-09-13 RX ADMIN — LISINOPRIL 5 MG: 5 TABLET ORAL at 11:09

## 2018-09-13 NOTE — SUBJECTIVE & OBJECTIVE
Interval History:   No acute events overnight. Patient reports that she feels well and is ready to go home. She still has a cough but it is managed with her tessalon, no other complaints.     Review of Systems   Constitutional: Negative for chills, fatigue and fever.   Respiratory: Positive for cough. Negative for chest tightness and shortness of breath.    Cardiovascular: Negative for chest pain, palpitations and leg swelling.   Gastrointestinal: Negative for abdominal pain, constipation, diarrhea and nausea.   Musculoskeletal: Positive for back pain. Negative for arthralgias and myalgias.   Skin: Negative for color change, pallor and rash.   Neurological: Negative for dizziness, weakness and headaches.     Objective:     Vital Signs (Most Recent):  Temp: 97.9 °F (36.6 °C) (09/13/18 0356)  Pulse: 81 (09/13/18 0356)  Resp: 18 (09/13/18 0356)  BP: 135/73 (09/13/18 0356)  SpO2: 98 % (09/13/18 0356) Vital Signs (24h Range):  Temp:  [96.7 °F (35.9 °C)-98.6 °F (37 °C)] 97.9 °F (36.6 °C)  Pulse:  [81-91] 81  Resp:  [16-20] 18  SpO2:  [95 %-98 %] 98 %  BP: (133-147)/(72-85) 135/73     Weight: 62.6 kg (138 lb)  Body mass index is 24.45 kg/m².    Intake/Output Summary (Last 24 hours) at 9/13/2018 0811  Last data filed at 9/12/2018 2100  Gross per 24 hour   Intake 2295 ml   Output --   Net 2295 ml      Physical Exam   Constitutional: She is oriented to person, place, and time. She appears well-developed and well-nourished. No distress.   HENT:   Head: Normocephalic and atraumatic.   Cardiovascular: Normal rate, regular rhythm, normal heart sounds and intact distal pulses.   Pulmonary/Chest: Effort normal. She has wheezes (bilateral upper and lower lobes).   Prominent cough present, nonproductive   Abdominal: Soft. Bowel sounds are normal.   Musculoskeletal: Normal range of motion. She exhibits tenderness (left foot, dressed after debridement yesterday). She exhibits no edema or deformity.   Neurological: She is alert and  oriented to person, place, and time.   Skin: Skin is warm and dry.   Psychiatric: She has a normal mood and affect. Her behavior is normal.   Vitals reviewed.      Significant Labs:   CBC:   Recent Labs   Lab  09/12/18   0556  09/13/18 0457   WBC  12.86*  12.50   HGB  11.1*  10.7*   HCT  35.6*  33.7*   PLT  319  293     CMP:   Recent Labs   Lab  09/12/18   0556  09/13/18 0457   NA  137  137   K  3.6  4.0   CL  101  102   CO2  27  29   GLU  314*  213*   BUN  13  16   CREATININE  0.8  0.7   CALCIUM  9.4  9.4   PROT  7.1  6.6   ALBUMIN  3.1*  2.9*   BILITOT  0.3  0.3   ALKPHOS  179*  176*   AST  40  29   ALT  83*  65*   ANIONGAP  9  6*   EGFRNONAA  >60.0  >60.0     Magnesium:   Recent Labs   Lab  09/12/18   0556  09/13/18 0457   MG  2.0  1.8     Phosphorus: 3.9    Significant Imaging: I have reviewed all pertinent imaging results/findings within the past 24 hours.

## 2018-09-13 NOTE — NURSING
Pt ready for discharge. Discharge instructions and prescriptions will be delivered. Patient verbalizes understanding. PIV removed. No further questions from pt. Pt to be discharged via wheelchair. Will cont to monitor.

## 2018-09-13 NOTE — PLAN OF CARE
SW spoke with Anne-pt will dc today. SW spoke with Toñito from [facility]-pt supplies and meds will be delivered to her home this afternoon. Acts  will admit pt tomorrow.    No additional sw needs at this time.    Lynette Davila, [facility] [id number]

## 2018-09-13 NOTE — DISCHARGE SUMMARY
[facility] Hospital Medicine  Discharge Summary      Patient Name: Mariah Foley  MRN: [id number]  Admission Date: 2018  Hospital Length of Stay: 11 days  Discharge Date and Time:  2018 1:45 PM  Attending Physician: Joe Helton MD   Discharging Provider: Homer Mcgrath MD  Primary Care Provider: Lukas Stein MD  [facility] Medicine Team: [facility] HOSP MED 5 Homer Mcgrath MD    HPI:   46 yo F  with pmhx of fibromyalgia and DM2, in her usual state of health until 5 days prior to admission when she developed a sore throat with productive cough, which resolved after 2 days. Patient then developed myalgias, mainly around her left scapula and left CVA which she describes as a muscle stretching pain, 7 of 10 intensity, constant, associated with subjective fever, chills, generalized body aches, headache, sharp right ear paip, nausea, vomiting (once daily for past 4 days in the morning, nonbloody). No alleviating or aggravating factors. Patient tried ibuprofen for the pain at home which did not help. No chest or abdomian pain, diarrhea, constipation, changes in urinary habits, dysuria, hematuria, vaginal discharge or odors. Not sexually active. Has a ulcer on plantar aspect of right great toe which she says has been there for 2 months and has not noticed any pain, warmth, or discharge from it, although on examination there was obvious purulent discharge once dressing was removed. 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 Denies any recent trauma, sick contacts, travel out of the country.     * No surgery found *      Hospital Course:     During hospital admission, MRI of R great toe planter ulcer showed no evidence of osteomyelitis or drainable fluid collection. Wound culture showed gram negative rods lactose . Urine culture showed staph aureus. Blood culture showed gram negative cocci. Repeat blood culture was done and patient was continued on  broad spectrum Abx (Vanc and Zosyn). Podiatry was consulted for debridment of her left toe ulcer. Patient then developed a productive cough, headache and congestion. Cetirizine and Fluticasone nasal spray was ordered. CT maxillofacial and 2d echo was ordered to rule out bacteremia complications. Zosyn was discontinued and she was continued on Vanc and Unasyn.  CT maxillofacial showed no drainable periodontal fluid collection. 2d echo showed no vegetations. In preparation for discharge, a PICC line was placed for continued antibiotics as an outpatient.   Patient received symptomatic care as well as diabetic education prior to discharge.    Consults:   Consults (From admission, onward)        Status Ordering Provider     Inpatient consult to Infectious Diseases  Once     Provider:  (Not yet assigned)    Completed FLORENTIN JACOBS     Inpatient consult to PICC team ([facility])  Once     Provider:  (Not yet assigned)    Completed JAVIER JO     Inpatient consult to PICC team ([facility])  Once     Provider:  (Not yet assigned)    Completed KAMARI SEGOVIA     Inpatient consult to Podiatry  Once     Provider:  (Not yet assigned)    Completed OSWALDO MELGAR     Inpatient consult to Registered Dietitian/Nutritionist  Once     Provider:  (Not yet assigned)    Completed KATHIA WREN          No new Assessment & Plan notes have been filed under this hospital service since the last note was generated.  Service: Hospital Medicine    Final Active Diagnoses:    Diagnosis Date Noted POA    PRINCIPAL PROBLEM:  Sepsis [A41.9] 09/02/2018 Yes    Hypertension [I10] 09/11/2018 Yes    Liver injury [S36.119A] 09/08/2018 Yes    Debility [R53.81] 09/03/2018 Yes    Diabetic ulcer of toe of right foot [E11.621, L97.519] 09/02/2018 Yes    Leukocytosis [D72.829] 09/02/2018 Yes    UTI (urinary tract infection) [N39.0] 09/02/2018 Yes    Tobacco abuse [Z72.0] 09/02/2018 Yes    DM2 (diabetes mellitus, type 2) [E11.9]  09/02/2018 Yes    Fibromyalgia [M79.7] 09/02/2018 Yes      Problems Resolved During this Admission:    Diagnosis Date Noted Date Resolved POA    Bacteremia [R78.81] 09/05/2018 09/10/2018 Yes    Foot ulcer due to secondary DM [E13.621, L97.509] 09/03/2018 09/04/2018 Yes    Infected skin ulcer with fat layer exposed [L98.492, L08.9] 09/03/2018 09/04/2018 Yes    Cough [R05] 09/02/2018 09/10/2018 Yes       Discharged Condition: good    Disposition: Home or Self Care    Follow Up:  Follow-up Information     Schedule an appointment as soon as possible for a visit with [facility].    Specialty:  Dental General Practice  Contact information:  [address] [facility] [address]  [phone]             Lukas Stein MD.    Specialty:  Family Medicine  Why:  As needed, If symptoms worsen  Contact information:  [address]  [address]  [place] [id number] [phone]             [facility].    Why:  IV meds and supplies will be delivered to pts home 9/13/18  Contact information:  [phone]            [facility].    Specialty:  Home Health Services  Why:  Home health nurse will contact patient to schedule home visit on 9/14  Contact information:  [address]  [place] [address] [phone]                 Patient Instructions:      Ambulatory consult to Diabetic Education   Referral Priority: Routine Referral Type: Consultation   Referral Reason: Specialty Services Required   Requested Specialty: Diabetes   Number of Visits Requested: 1 Expiration Date: 09/12/19     Ambulatory Referral to Endocrinology   Referral Priority: Urgent Referral Type: Consultation   Requested Specialty: Endocrinology   Number of Visits Requested: 1     Notify your health care provider if you experience any of the following:  temperature >100.4     Notify your health care provider if you experience any of the following:  persistent nausea and vomiting or diarrhea     Notify your health care provider  if you experience any of the following:  severe uncontrolled pain     Notify your health care provider if you experience any of the following:  difficulty breathing or increased cough     Notify your health care provider if you experience any of the following:  severe persistent headache     Notify your health care provider if you experience any of the following:  worsening rash     Notify your health care provider if you experience any of the following:  persistent dizziness, light-headedness, or visual disturbances     Notify your health care provider if you experience any of the following:  increased confusion or weakness     Activity as tolerated       Significant Diagnostic Studies: Labs:   CMP   Recent Labs   Lab  09/12/18   0556  09/13/18   0457   NA  137  137   K  3.6  4.0   CL  101  102   CO2  27  29   GLU  314*  213*   BUN  13  16   CREATININE  0.8  0.7   CALCIUM  9.4  9.4   PROT  7.1  6.6   ALBUMIN  3.1*  2.9*   BILITOT  0.3  0.3   ALKPHOS  179*  176*   AST  40  29   ALT  83*  65*   ANIONGAP  9  6*   ESTGFRAFRICA  >60.0  >60.0   EGFRNONAA  >60.0  >60.0   , CBC   Recent Labs   Lab  09/12/18   0556  09/13/18   0457   WBC  12.86*  12.50   HGB  11.1*  10.7*   HCT  35.6*  33.7*   PLT  319  293    and A1C:   Recent Labs   Lab  09/03/18   0503   HGBA1C  12.3*     Microbiology:   Blood Culture   Lab Results   Component Value Date    LABBLOO No growth after 5 days. 09/04/2018    LABBLOO No growth after 5 days. 09/04/2018   , Urine Culture    Lab Results   Component Value Date    LABURIN  09/02/2018     METHICILLIN RESISTANT STAPHYLOCOCCUS AUREUS  >100,000cfu/ml      and Wound Culture: positive  Cardiac Graphics: Echocardiogram:   2D echo with color flow doppler:   Results for orders placed or performed during the hospital encounter of 09/02/18   2D echo with color flow doppler   Result Value Ref Range    EF 60 55 - 65    Mitral Valve Regurgitation TRIVIAL     Diastolic Dysfunction No     Est. PA Systolic Pressure  "36.94     Tricuspid Valve Regurgitation TRIVIAL        Pending Diagnostic Studies:     Procedure Component Value Units Date/Time    X-Ray Chest 1 View for PICC_Central line [[id number]]     Order Status:  Sent Lab Status:  No result          Medications:  Reconciled Home Medications:      Medication List      START taking these medications    AMPICILLIN/SULBACTAM 3 G/100 ML NS (READY TO MIX)  Inject 100 mLs (3 g total) into the vein every 6 (six) hours. for 6 days     gabapentin 300 MG capsule  Commonly known as:  NEURONTIN  Take 1 capsule (300 mg total) by mouth 3 (three) times daily.  Replaces:  gabapentin 800 MG tablet     * insulin lispro 100 unit/mL Inpn pen  Commonly known as:  HUMALOG KWIKPEN  Inject 23 Units into the skin 3 (three) times daily with meals.     * insulin lispro 100 unit/mL injection  Commonly known as:  HUMALOG  Inject 23 Units into the skin 3 (three) times daily before meals.     lisinopril 5 MG tablet  Commonly known as:  PRINIVIL,ZESTRIL  Take 1 tablet (5 mg total) by mouth once daily.     pen needle, diabetic 32 gauge x 5/32" Ndle  For use with Basaglar and Ademalog pens     vancomycin in dextrose 5 % 1 gram/250 mL Soln  Inject 250 mLs (1,000 mg total) into the vein 3 (three) times daily. Inject 1 gram into vein three times daily for 6 days         * This list has 2 medication(s) that are the same as other medications prescribed for you. Read the directions carefully, and ask your doctor or other care provider to review them with you.            CHANGE how you take these medications    insulin glargine 100 unit/mL (3 mL) Inpn pen  Commonly known as:  LANTUS SOLOSTAR  Inject 55 Units into the skin 2 (two) times daily.  What changed:    · how much to take  · when to take this        CONTINUE taking these medications    calcium carbonate 200 mg calcium (500 mg) chewable tablet  Commonly known as:  TUMS  Take 1 tablet by mouth daily as needed for Heartburn.     cyclobenzaprine 10 MG " tablet  Commonly known as:  FLEXERIL  Take 10 mg by mouth 3 (three) times daily.     escitalopram oxalate 10 MG tablet  Commonly known as:  LEXAPRO  Take 10 mg by mouth once daily.     mupirocin 2 % ointment  Commonly known as:  BACTROBAN  Apply topically daily as needed     vancomycin (bulk) 900 mcg/mg (not less than) Powd  1 application by Misc.(Non-Drug; Combo Route) route daily as needed (apply powder to right foot as needed).     VITAMIN D2 50,000 unit Cap  Generic drug:  ergocalciferol  Take 50,000 Units by mouth every 7 days.        STOP taking these medications    gabapentin 800 MG tablet  Commonly known as:  NEURONTIN  Replaced by:  gabapentin 300 MG capsule     oxyCODONE-acetaminophen 5-325 mg per tablet  Commonly known as:  PERCOCET            Indwelling Lines/Drains at time of discharge:   Lines/Drains/Airways     Peripherally Inserted Central Catheter Line                 PICC Double Lumen 09/11/18 1006 right basilic 2 days                Time spent on the discharge of patient: 30 minutes  Patient was seen and examined on the date of discharge and determined to be suitable for discharge.         Homer Mcgrath MD  Department of Hospital Medicine  [facility]

## 2018-09-14 NOTE — PHYSICIAN QUERY
PT Name: Mariah Foley  MR #: [id number]     Physician Query Form - Documentation Clarification      Micki Ren RN CDS    Contact Information: [phone]    This form is a permanent document in the medical record.     Query Date: September 14, 2018    By submitting this query, we are merely seeking further clarification of documentation. Please utilize your independent clinical judgment when addressing the question(s) below.    The Medical record reflects the following:    Supporting Clinical Findings Location in Medical Record   Liver injury - AST and ALT is trending up since date of admission.   - Liver US scan and hepatitis study were ordered.     Liver injury - AST and ALT is trending up since date of admission.   - Liver US scan showed fatty live with no focal hepatic lesion   - hepatitis study still in process.     LFT abnormalities: hepatitis studies pending, LFTs improved with decreased duloxetine dose, will continue to monitor       Liver Injury        Findings suggestive of hepatic steatosis.    Small perisplenic collaterals.   9/8 Hospital Medicine PN          9/9 Hospital Medicine PN                9/13 DC Summary        9/8 US Abdomen Limited   Alkaline Phosphatase 145, 103,147, 188, 200, 195, 194, 197, 184, 188, 179,176, 179    ALT 14,15, 48,47, 66, 101, 109, 98, 89, 83, 65    AST 14, 17, 41,51, 32, 57, 90, 83, 64, 48, 40, 29    Albumin 3.4, 2.7, 2.6, 2.3, 2.6, 2.9,2.8, 2.9, 3.1, 2.9 9/2-9/13 Lab                                                                            Doctor, Please specify diagnosis or diagnoses associated with above clinical findings.    Provider Use Only      [x  ] Acute Liver Injury    [  ] Acute hepatitis    [  ] Liver Injury    [  ] Other Conditions (Specify)_____________________________                                                                                                               [  ] Clinically undetermined

## 2018-09-14 NOTE — PHYSICIAN QUERY
"PT Name: Mariah Foley  MR #: [id number]     Physician Query Form - Documentation Clarification      Micki Ren RN CDS    Contact Information: [phone]    This form is a permanent document in the medical record.     Query Date: September 14, 2018    By submitting this query, we are merely seeking further clarification of documentation. Please utilize your independent clinical judgment when addressing the question(s) below.    The Medical record reflects the following:    Supporting Clinical Findings Location in Medical Record   46 yo F w/ IDDM and neuropathy presents w/ sepsis suspected source is diabetic foot ulcer.       DM2 (diabetes mellitus, type 2) -takes insulin at home, patient think it's 10 units at night. Also on glimepiride BID   Foot ulcer due to secondary DM     Diabetic ulcer of toe of right foot   Foot ulcer due to secondary DM     Diabetic ulcer of toe of right foot   Foot ulcer due to secondary DM     Diabetic ulcer of toe of right foot    9/2 H&P            9/3 Hospital Medicine PN          9/12 Hospital Medicine POC      9/13 DC Summary      9/3 Podiatry Consult                                                                                          Doctor, Please specify diagnosis or diagnoses associated with above clinical findings.    Please clarify the term "secondary DM"    Provider Use Only      [x  ] Type 2 diabetes Mellitus with  foot ulcer       [  ] Other Conditions (Specify)___________________                                                                                                                 [  ] Clinically undetermined            "

## 2018-09-14 NOTE — PLAN OF CARE
Plan is to discharge with [facility] and [facility] for IV Abx.       09/14/18 0817   Final Note   Assessment Type Final Discharge Note   Discharge Disposition Home-Health   Right Care Referral Info   Post Acute Recommendation Home-care   Referral Type Home Health   Facility Name [facility]

## 2018-09-19 ENCOUNTER — OFFICE VISIT (OUTPATIENT)
Dept: INFECTIOUS DISEASES | Facility: CLINIC | Age: 48
End: 2018-09-19
Attending: INTERNAL MEDICINE
Payer: MEDICAID

## 2018-09-19 VITALS
HEART RATE: 95 BPM | BODY MASS INDEX: 24.22 KG/M2 | DIASTOLIC BLOOD PRESSURE: 84 MMHG | SYSTOLIC BLOOD PRESSURE: 125 MMHG | WEIGHT: 136.69 LBS | HEIGHT: 63 IN | TEMPERATURE: 98 F

## 2018-09-19 DIAGNOSIS — K12.2 MOUTH ABSCESS: Primary | ICD-10-CM

## 2018-09-19 DIAGNOSIS — B95.62 MRSA BACTEREMIA: ICD-10-CM

## 2018-09-19 DIAGNOSIS — R78.81 MRSA BACTEREMIA: ICD-10-CM

## 2018-09-19 DIAGNOSIS — A49.02 MRSA INFECTION: ICD-10-CM

## 2018-09-19 PROCEDURE — 99999 PR PBB SHADOW E&M-EST. PATIENT-LVL IV: CPT | Mod: PBBFAC,,, | Performed by: INTERNAL MEDICINE

## 2018-09-19 PROCEDURE — 99214 OFFICE O/P EST MOD 30 MIN: CPT | Mod: S$PBB,,, | Performed by: INTERNAL MEDICINE

## 2018-09-19 PROCEDURE — 99214 OFFICE O/P EST MOD 30 MIN: CPT | Mod: PBBFAC | Performed by: INTERNAL MEDICINE

## 2018-09-19 RX ORDER — CLINDAMYCIN HYDROCHLORIDE 150 MG/1
300 CAPSULE ORAL 3 TIMES DAILY
Qty: 90 CAPSULE | Refills: 4 | Status: SHIPPED | OUTPATIENT
Start: 2018-09-19 | End: 2019-02-01

## 2018-09-19 NOTE — PROGRESS NOTES
"INFECTIOUS DISEASE CLINIC    Subjective:      Chief Complaint: hospital discharge follow-up    History of Present Illness:    Patient Mariah Foley is a 47 y.o. female who presents today for hospital discharge follow-up.  She has T2DM and h/o recurrent MRSA abscesses who was admitted two weeks ago with fevers and severe mouth/tooth pain reporting recurrent abscesses in her mouth. 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  Suspected the source of both of these infections is mouth. She was discharged on 2 weeks of unasyn and vanc.    Since discharge, she reports being very depressed. Reports in her absence, she has a new roommate that she is not happy about. She plans to move to [place] and doesn't think she can return to [place] for further follow-up 2/2 lack of transportation. She reports compliance with Vancomycin and Unasyn and says she is exhausted from Q6h and q8 hour dosing schedule. She denies feeling further "pus pockets" popping in her mouth. Her plan for tooth extraction is to go to [facility] in [place] in the next several days to have half of her teeth pulled. Then, she is scheduled to have the remainder pulled in January. Denies complaints today including fever/chills. Denies ever having issues with foot ulcer and wears an offloading shoe and dresses daily with vanc powder. Wants the picc line pulled.     Review of Symptoms:  Constitutional: Denies fevers, chills, or weakness.  ENT: Denies dysphagia, nasal discharge, ear pain or discharge.  Cardiovascular: Denies chest pain, palpitations, orthopnea, or claudication.  Respiratory: Denies shortness of breath, cough, hemoptysis, or wheezing.  GI: Denies nausea/vomitting, hematochezia, melena, abd pain, or changes in appetite.  : Denies dysuria, incontinence, or hematuria.  Musculoskeletal: Denies joint pain or " myalgias.  Skin/breast: Denies rashes, lumps, lesions, or discharge.  Neurologic: Denies headache, dizziness, vertigo, or paresthesias.    Past Medical History:   Diagnosis Date    Diabetes mellitus        Past Surgical History:   Procedure Laterality Date     SECTION, CLASSIC      TUBAL LIGATION         History reviewed. No pertinent family history.    Social History     Socioeconomic History    Marital status: Single     Spouse name: Not on file    Number of children: Not on file    Years of education: Not on file    Highest education level: Not on file   Social Needs    Financial resource strain: Not on file    Food insecurity - worry: Not on file    Food insecurity - inability: Not on file    Transportation needs - medical: Not on file    Transportation needs - non-medical: Not on file   Occupational History    Not on file   Tobacco Use    Smoking status: Current Every Day Smoker     Packs/day: 0.50     Years: 7.00     Pack years: 3.50     Types: Cigarettes    Smokeless tobacco: Never Used   Substance and Sexual Activity    Alcohol use: No    Drug use: No    Sexual activity: Not Currently   Other Topics Concern    Not on file   Social History Narrative    Not on file       Review of patient's allergies indicates:   Allergen Reactions    Bactrim [sulfamethoxazole-trimethoprim]      PMHx:   IDDM  Recurrent MRSA abscesses  Dental caries     PSHx:   /tubal ligation     A:   Bactrim     Home meds:  vanc powder to foot  Vitamin d  lexapro  Gabapentin  lantus     Fhx: noncontributory        Objective:   VS (24h):   Vitals:    18 1540   BP: 125/84   Pulse: 95   Temp: 98.1 °F (36.7 °C)     GEN:thin WF in NAD  HEENT: nearly every tooth broken and necrotic with several small areas of swelling c/f abscess  CV: RRR  PULM: CTAB  ABD: NTND BS+  EXT: no c/c/e; small foot ulcer clean. No drainage.   picc line in RUE without erythema, warmth or drainage    Wound cultures also grew  Veillonella, prevotella, and kelsiella  Labs:  Glucose   Date Value Ref Range Status   09/13/2018 213 (H) 70 - 110 mg/dL Final   09/12/2018 314 (H) 70 - 110 mg/dL Final   09/11/2018 353 (H) 70 - 110 mg/dL Final     Calcium   Date Value Ref Range Status   09/13/2018 9.4 8.7 - 10.5 mg/dL Final   09/12/2018 9.4 8.7 - 10.5 mg/dL Final   09/11/2018 9.3 8.7 - 10.5 mg/dL Final     Albumin   Date Value Ref Range Status   09/13/2018 2.9 (L) 3.5 - 5.2 g/dL Final   09/12/2018 3.1 (L) 3.5 - 5.2 g/dL Final   09/11/2018 2.9 (L) 3.5 - 5.2 g/dL Final     Total Protein   Date Value Ref Range Status   09/13/2018 6.6 6.0 - 8.4 g/dL Final   09/12/2018 7.1 6.0 - 8.4 g/dL Final   09/11/2018 6.6 6.0 - 8.4 g/dL Final     Sodium   Date Value Ref Range Status   09/13/2018 137 136 - 145 mmol/L Final   09/12/2018 137 136 - 145 mmol/L Final   09/11/2018 135 (L) 136 - 145 mmol/L Final     Potassium   Date Value Ref Range Status   09/13/2018 4.0 3.5 - 5.1 mmol/L Final   09/12/2018 3.6 3.5 - 5.1 mmol/L Final   09/11/2018 3.7 3.5 - 5.1 mmol/L Final     CO2   Date Value Ref Range Status   09/13/2018 29 23 - 29 mmol/L Final   09/12/2018 27 23 - 29 mmol/L Final   09/11/2018 26 23 - 29 mmol/L Final     Chloride   Date Value Ref Range Status   09/13/2018 102 95 - 110 mmol/L Final   09/12/2018 101 95 - 110 mmol/L Final   09/11/2018 99 95 - 110 mmol/L Final     BUN, Bld   Date Value Ref Range Status   09/13/2018 16 6 - 20 mg/dL Final   09/12/2018 13 6 - 20 mg/dL Final   09/11/2018 12 6 - 20 mg/dL Final     Creatinine   Date Value Ref Range Status   09/13/2018 0.7 0.5 - 1.4 mg/dL Final   09/12/2018 0.8 0.5 - 1.4 mg/dL Final   09/11/2018 0.8 0.5 - 1.4 mg/dL Final     Alkaline Phosphatase   Date Value Ref Range Status   09/13/2018 176 (H) 55 - 135 U/L Final   09/12/2018 179 (H) 55 - 135 U/L Final   09/11/2018 188 (H) 55 - 135 U/L Final     ALT   Date Value Ref Range Status   09/13/2018 65 (H) 10 - 44 U/L Final   09/12/2018 83 (H) 10 - 44 U/L Final    09/11/2018 89 (H) 10 - 44 U/L Final     AST   Date Value Ref Range Status   09/13/2018 29 10 - 40 U/L Final   09/12/2018 40 10 - 40 U/L Final   09/11/2018 48 (H) 10 - 40 U/L Final     Total Bilirubin   Date Value Ref Range Status   09/13/2018 0.3 0.1 - 1.0 mg/dL Final     Comment:     For infants and newborns, interpretation of results should be based  on gestational age, weight and in agreement with clinical  observations.  Premature Infant recommended reference ranges:  Up to 24 hours.............<8.0 mg/dL  Up to 48 hours............<12.0 mg/dL  3-5 days..................<15.0 mg/dL  6-29 days.................<15.0 mg/dL     09/12/2018 0.3 0.1 - 1.0 mg/dL Final     Comment:     For infants and newborns, interpretation of results should be based  on gestational age, weight and in agreement with clinical  observations.  Premature Infant recommended reference ranges:  Up to 24 hours.............<8.0 mg/dL  Up to 48 hours............<12.0 mg/dL  3-5 days..................<15.0 mg/dL  6-29 days.................<15.0 mg/dL     09/11/2018 0.2 0.1 - 1.0 mg/dL Final     Comment:     For infants and newborns, interpretation of results should be based  on gestational age, weight and in agreement with clinical  observations.  Premature Infant recommended reference ranges:  Up to 24 hours.............<8.0 mg/dL  Up to 48 hours............<12.0 mg/dL  3-5 days..................<15.0 mg/dL  6-29 days.................<15.0 mg/dL       WBC   Date Value Ref Range Status   09/13/2018 12.50 3.90 - 12.70 K/uL Final   09/12/2018 12.86 (H) 3.90 - 12.70 K/uL Final   09/11/2018 12.03 3.90 - 12.70 K/uL Final     Hemoglobin   Date Value Ref Range Status   09/13/2018 10.7 (L) 12.0 - 16.0 g/dL Final   09/12/2018 11.1 (L) 12.0 - 16.0 g/dL Final   09/11/2018 10.5 (L) 12.0 - 16.0 g/dL Final     Hematocrit   Date Value Ref Range Status   09/13/2018 33.7 (L) 37.0 - 48.5 % Final   09/12/2018 35.6 (L) 37.0 - 48.5 % Final   09/11/2018 32.3 (L)  37.0 - 48.5 % Final     MCV   Date Value Ref Range Status   09/13/2018 86 82 - 98 fL Final   09/12/2018 86 82 - 98 fL Final   09/11/2018 84 82 - 98 fL Final     Platelets   Date Value Ref Range Status   09/13/2018 293 150 - 350 K/uL Final   09/12/2018 319 150 - 350 K/uL Final   09/11/2018 291 150 - 350 K/uL Final     No results found for: CHOL  No results found for: HDL  No results found for: LDLCALC  No results found for: TRIG  No results found for: CHOLHDL  No results found for: RPR  No results found for: QUANTIFERON    Medications:  Current Outpatient Medications on File Prior to Visit   Medication Sig Dispense Refill    cyclobenzaprine (FLEXERIL) 10 MG tablet Take 10 mg by mouth 3 (three) times daily.      gabapentin (NEURONTIN) 300 MG capsule Take 1 capsule (300 mg total) by mouth 3 (three) times daily. 270 capsule 3    vancomycin HCl (VANCOMYCIN, BULK,) 900 mcg/mg (not less than) Powd 1 application by Misc.(Non-Drug; Combo Route) route daily as needed (apply powder to right foot as needed).       vancomycin HCl in 5 % dextrose (VANCOMYCIN IN DEXTROSE 5 %) 1 gram/250 mL Soln Inject 250 mLs (1,000 mg total) into the vein 3 (three) times daily. Inject 1 gram into vein three times daily for 6 days      ampicillin sodium/sulbactam Na (AMPICILLIN/SULBACTAM 3 G/100 ML NS, READY TO MIX,) Inject 100 mLs (3 g total) into the vein every 6 (six) hours. for 6 days 2400 mL 0    calcium carbonate (TUMS) 200 mg calcium (500 mg) chewable tablet Take 1 tablet by mouth daily as needed for Heartburn.      ergocalciferol (VITAMIN D2) 50,000 unit Cap Take 50,000 Units by mouth every 7 days.      escitalopram oxalate (LEXAPRO) 10 MG tablet Take 10 mg by mouth once daily.      insulin glargine (LANTUS SOLOSTAR) 100 unit/mL (3 mL) InPn pen Inject 55 Units into the skin 2 (two) times daily. 15 mL 3    insulin lispro (HUMALOG KWIKPEN) 100 unit/mL InPn pen Inject 23 Units into the skin 3 (three) times daily with meals. 15 mL 3  "   insulin lispro (HUMALOG) 100 unit/mL injection Inject 23 Units into the skin 3 (three) times daily before meals. 62.1 mL 3    lisinopril (PRINIVIL,ZESTRIL) 5 MG tablet Take 1 tablet (5 mg total) by mouth once daily. 90 tablet 3    mupirocin (BACTROBAN) 2 % ointment Apply topically daily as needed      pen needle, diabetic 32 gauge x 5/32" Ndle For use with Basaglar and Ademalog pens 200 each 3     No current facility-administered medications on file prior to visit.        Antibiotics:   Antibiotics (From admission, onward)    None          Microbiology x 7d:   Microbiology Results (last 7 days)     ** No results found for the last 168 hours. **        HCV ab negative    Reviewed outpatient labs from [facility] from 9/17/18  Vancomycin trough 9  crp 7.8 (range 0-5)    Assessment and plan:     A/P: 47F with T2DM and h/o recurrent MRSA abscesses recently admitted with fevers and severe mouth/tooth pain reporting recurrent abscesses in her mouth. 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  Suspected source of both of these infections is mouth. No signs of abscess on ct face. Her foot ulcer looks well healed on exam. Veillonella is a rare gram negative anerobic cocci. It is part of oral jose e and is capable of causing severe infections such as endocarditis, osteomyelitis. Very little data exists on treatment. There have been cases of penicillin, vanc, cipro, tetracycline resistance. Unfortuantly no sensitivity results were done. studies suggest that chloramphenicol, clindamycin, and metronidazole have the greatest activity against Veillonella.      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 Stop iv abx and change to clindamycin. Suspect teeth was source. 2d echo negative vegetaion. Plan to continue until teeth extraction in January. Strongly encouraged " patient to follow-up with us, but she doesn't think this will be possible because of move to [place] and lack of transportation.        # MRSA in urine 2/2 presumed MRSA bacteremia-- blood cultures and 2d echo all negative. Was treated with vancomycin IV x 14 days. Pull picc, stop vanc.     Yisel Pedraza MD, MPH  Infectious Disease      Orders Placed This Encounter   Procedures    Nursing communication     Scheduling Instructions:      Pull picc line

## 2018-09-20 PROBLEM — K12.2 MOUTH ABSCESS: Status: ACTIVE | Noted: 2018-09-20

## 2018-09-20 PROBLEM — A49.02 MRSA INFECTION: Status: ACTIVE | Noted: 2018-09-20

## 2018-09-21 ENCOUNTER — TELEPHONE (OUTPATIENT)
Dept: DIABETES | Facility: CLINIC | Age: 48
End: 2018-09-21

## 2018-09-21 NOTE — TELEPHONE ENCOUNTER
Spoke with pt to schedule Diabetes education.  She would like to go to the [place] location.  Sent message to the DM educator in [place] to contact her to schedule.

## 2018-09-21 NOTE — PROCEDURES
Procedure note: 9/05/2018  Procedure: Debridement of wound, plantar R hallux    Surgeon: Law Burroughs DPM  Assisting  Surgeon:-Shawn Moe DPM, PGY-1     Diagnosis: R plantar hallux wound.    Findings: 0.25x0.25x0.1cm wound with hyperkeratotic wound margins and granular base.    With patient's verbal consent, wound was debrided to the level of subcut tissue of any non-viable tissue. A granular base was established with viable margins. Patient tolerated procedure well. Wound was dressed with DSD.    Post debridement measurements  0.4 x 0.4x 0.1cm    Estimated blood loss: <1mL

## 2018-09-24 NOTE — PHYSICIAN QUERY
"PT Name: Mariah Foley  MR #: [id number]    Physician Query Form - Procedure Clarification     Micki Ren RN CDS    Contact Information: [phone]  This form is a permanent document in the medical record.     Query Date: September 24, 2018  By submitting this query, we are merely seeking further clarification of documentation. Please utilize your independent clinical judgment when addressing the question(s) below.    The Medical record contains the following:     Indicators       Supporting Clinical Findings   Location in Medical Record   X Documentation of "Debridement"   Debridement of wound, plantar R hallux    9/5 Procedure note    Documentation of "I & D"     X EBL = Estimated blood loss: <1mL    9/5 Procedure note   X Other: With patient's verbal consent, wound was debrided to    the level of subcut tissue of any non-viable tissue.     A granular base was established with viable margins. Patient tolerated procedure well. Wound was dressed with DSD.    9/5 Procedure note     Excisional debridement is a surgical removal of  nonvitalized tissue, necrosis or slough. The use of a sharp instrument does not always indicate that an excisional debridement was performed.  Non excisional debridement is the scraping, washing, irrigating, brushing away or removal of loose tissue fragments.    Provider, please specify type of procedure(s) performed:    [ X ] Excisional Debridement (Specify site and depth of tissue removed)   * Site: (Specify)_____________________________________   * Depth of tissue excised:    [  ] Skin [  ]Subcutaneous Tissue/Fascia   [ ] Muscle [  ] Tendon [ ] Bone     [  ] Non-excisional Debridement   * Depth of tissue excised:    [  ] Skin [  ]Subcutaneous Tissue/Fascia   [ ] Muscle [  ] Tendon [ ] Bone       [  ] Other Procedure (Specify) ________________________________    [  ] Clinically Undetermined              "

## 2021-02-04 NOTE — ASSESSMENT & PLAN NOTE
-takes insulin at home, patient think it's 10 units at night. Also on glimepiride BID   -moderate correctional scale, detemir 8U qHS, accuchecks qAC qHS   -goal CBG < 180    [facility]

## 2021-10-18 NOTE — ED TRIAGE NOTES
Comes to the ED with c/o flu like symptoms and dental pain.  Chills, nausea, diarrhea, no fever.   RANDOLPH (acute kidney injury)